# Patient Record
Sex: FEMALE | Race: OTHER | HISPANIC OR LATINO
[De-identification: names, ages, dates, MRNs, and addresses within clinical notes are randomized per-mention and may not be internally consistent; named-entity substitution may affect disease eponyms.]

---

## 2017-08-24 ENCOUNTER — APPOINTMENT (OUTPATIENT)
Dept: CARDIOLOGY | Facility: CLINIC | Age: 82
End: 2017-08-24
Payer: MEDICARE

## 2017-08-24 ENCOUNTER — NON-APPOINTMENT (OUTPATIENT)
Age: 82
End: 2017-08-24

## 2017-08-24 VITALS
SYSTOLIC BLOOD PRESSURE: 137 MMHG | BODY MASS INDEX: 20.44 KG/M2 | HEART RATE: 58 BPM | OXYGEN SATURATION: 98 % | HEIGHT: 64 IN | WEIGHT: 119.71 LBS | DIASTOLIC BLOOD PRESSURE: 74 MMHG

## 2017-08-24 DIAGNOSIS — Z86.79 PERSONAL HISTORY OF OTHER DISEASES OF THE CIRCULATORY SYSTEM: ICD-10-CM

## 2017-08-24 DIAGNOSIS — Z09 ENCOUNTER FOR FOLLOW-UP EXAMINATION AFTER COMPLETED TREATMENT FOR CONDITIONS OTHER THAN MALIGNANT NEOPLASM: ICD-10-CM

## 2017-08-24 DIAGNOSIS — Z82.49 FAMILY HISTORY OF ISCHEMIC HEART DISEASE AND OTHER DISEASES OF THE CIRCULATORY SYSTEM: ICD-10-CM

## 2017-08-24 PROCEDURE — 99204 OFFICE O/P NEW MOD 45 MIN: CPT

## 2017-08-24 PROCEDURE — 93000 ELECTROCARDIOGRAM COMPLETE: CPT

## 2017-08-24 RX ORDER — ASPIRIN ENTERIC COATED TABLETS 81 MG 81 MG/1
81 TABLET, DELAYED RELEASE ORAL DAILY
Qty: 90 | Refills: 3 | Status: ACTIVE | COMMUNITY
Start: 2017-08-24 | End: 1900-01-01

## 2017-08-24 RX ORDER — DILTIAZEM HYDROCHLORIDE 120 MG/1
120 CAPSULE, EXTENDED RELEASE ORAL DAILY
Qty: 90 | Refills: 3 | Status: ACTIVE | COMMUNITY
Start: 1900-01-01 | End: 1900-01-01

## 2017-09-29 ENCOUNTER — OUTPATIENT (OUTPATIENT)
Dept: OUTPATIENT SERVICES | Facility: HOSPITAL | Age: 82
LOS: 1 days | End: 2017-09-29
Payer: COMMERCIAL

## 2017-09-29 ENCOUNTER — APPOINTMENT (OUTPATIENT)
Dept: RADIOLOGY | Facility: CLINIC | Age: 82
End: 2017-09-29
Payer: MEDICARE

## 2017-09-29 DIAGNOSIS — Z00.8 ENCOUNTER FOR OTHER GENERAL EXAMINATION: ICD-10-CM

## 2017-09-29 PROCEDURE — 73030 X-RAY EXAM OF SHOULDER: CPT | Mod: 26,RT

## 2017-09-29 PROCEDURE — 73030 X-RAY EXAM OF SHOULDER: CPT

## 2017-10-05 ENCOUNTER — APPOINTMENT (OUTPATIENT)
Dept: MAMMOGRAPHY | Facility: CLINIC | Age: 82
End: 2017-10-05
Payer: MEDICARE

## 2017-10-05 ENCOUNTER — OUTPATIENT (OUTPATIENT)
Dept: OUTPATIENT SERVICES | Facility: HOSPITAL | Age: 82
LOS: 1 days | End: 2017-10-05
Payer: COMMERCIAL

## 2017-10-05 DIAGNOSIS — Z00.00 ENCOUNTER FOR GENERAL ADULT MEDICAL EXAMINATION WITHOUT ABNORMAL FINDINGS: ICD-10-CM

## 2017-10-05 PROCEDURE — G0204: CPT | Mod: 26

## 2017-10-05 PROCEDURE — G0279: CPT | Mod: 26

## 2017-10-05 PROCEDURE — 77066 DX MAMMO INCL CAD BI: CPT

## 2017-10-05 PROCEDURE — G0279: CPT

## 2017-10-07 ENCOUNTER — OUTPATIENT (OUTPATIENT)
Dept: OUTPATIENT SERVICES | Facility: HOSPITAL | Age: 82
LOS: 1 days | End: 2017-10-07
Payer: COMMERCIAL

## 2017-10-07 ENCOUNTER — APPOINTMENT (OUTPATIENT)
Dept: MRI IMAGING | Facility: CLINIC | Age: 82
End: 2017-10-07
Payer: MEDICARE

## 2017-10-07 DIAGNOSIS — I65.21 OCCLUSION AND STENOSIS OF RIGHT CAROTID ARTERY: ICD-10-CM

## 2017-10-07 PROCEDURE — 82565 ASSAY OF CREATININE: CPT

## 2017-10-07 PROCEDURE — 70549 MR ANGIOGRAPH NECK W/O&W/DYE: CPT | Mod: 26

## 2017-10-07 PROCEDURE — 70549 MR ANGIOGRAPH NECK W/O&W/DYE: CPT

## 2017-10-07 PROCEDURE — A9579: CPT

## 2017-10-12 RX ORDER — ATORVASTATIN CALCIUM 20 MG/1
20 TABLET, FILM COATED ORAL
Qty: 1 | Refills: 1 | Status: ACTIVE | COMMUNITY
Start: 1900-01-01 | End: 1900-01-01

## 2018-05-07 ENCOUNTER — OTHER (OUTPATIENT)
Age: 83
End: 2018-05-07

## 2018-06-04 ENCOUNTER — OTHER (OUTPATIENT)
Age: 83
End: 2018-06-04

## 2018-07-03 ENCOUNTER — APPOINTMENT (OUTPATIENT)
Dept: CARDIOLOGY | Facility: CLINIC | Age: 83
End: 2018-07-03
Payer: MEDICARE

## 2018-07-03 PROCEDURE — 93880 EXTRACRANIAL BILAT STUDY: CPT

## 2018-07-12 ENCOUNTER — NON-APPOINTMENT (OUTPATIENT)
Age: 83
End: 2018-07-12

## 2018-07-12 ENCOUNTER — APPOINTMENT (OUTPATIENT)
Dept: CARDIOLOGY | Facility: CLINIC | Age: 83
End: 2018-07-12
Payer: MEDICARE

## 2018-07-12 VITALS
HEART RATE: 60 BPM | OXYGEN SATURATION: 100 % | BODY MASS INDEX: 21.51 KG/M2 | TEMPERATURE: 98.1 F | WEIGHT: 126 LBS | HEIGHT: 64 IN | SYSTOLIC BLOOD PRESSURE: 157 MMHG | DIASTOLIC BLOOD PRESSURE: 70 MMHG

## 2018-07-12 DIAGNOSIS — R10.9 UNSPECIFIED ABDOMINAL PAIN: ICD-10-CM

## 2018-07-12 PROCEDURE — 99214 OFFICE O/P EST MOD 30 MIN: CPT

## 2018-07-12 PROCEDURE — 93000 ELECTROCARDIOGRAM COMPLETE: CPT

## 2018-07-15 ENCOUNTER — TRANSCRIPTION ENCOUNTER (OUTPATIENT)
Age: 83
End: 2018-07-15

## 2018-07-16 ENCOUNTER — RESULT REVIEW (OUTPATIENT)
Age: 83
End: 2018-07-16

## 2018-07-16 ENCOUNTER — INPATIENT (INPATIENT)
Facility: HOSPITAL | Age: 83
LOS: 4 days | Discharge: HOME | DRG: 416 | End: 2018-07-21
Attending: SURGERY | Admitting: SURGERY
Payer: MEDICARE

## 2018-07-16 VITALS
SYSTOLIC BLOOD PRESSURE: 124 MMHG | HEIGHT: 60 IN | HEART RATE: 92 BPM | RESPIRATION RATE: 17 BRPM | DIASTOLIC BLOOD PRESSURE: 74 MMHG | OXYGEN SATURATION: 96 % | TEMPERATURE: 98 F | WEIGHT: 154.98 LBS

## 2018-07-16 DIAGNOSIS — Z01.810 ENCOUNTER FOR PREPROCEDURAL CARDIOVASCULAR EXAMINATION: ICD-10-CM

## 2018-07-16 DIAGNOSIS — I25.10 ATHEROSCLEROTIC HEART DISEASE OF NATIVE CORONARY ARTERY WITHOUT ANGINA PECTORIS: ICD-10-CM

## 2018-07-16 DIAGNOSIS — Z29.9 ENCOUNTER FOR PROPHYLACTIC MEASURES, UNSPECIFIED: ICD-10-CM

## 2018-07-16 DIAGNOSIS — K82.2 PERFORATION OF GALLBLADDER: ICD-10-CM

## 2018-07-16 DIAGNOSIS — I65.21 OCCLUSION AND STENOSIS OF RIGHT CAROTID ARTERY: ICD-10-CM

## 2018-07-16 DIAGNOSIS — E78.4 OTHER HYPERLIPIDEMIA: ICD-10-CM

## 2018-07-16 LAB
ALBUMIN SERPL ELPH-MCNC: 2.7 G/DL — LOW (ref 3.3–5.2)
ALP SERPL-CCNC: 87 U/L — SIGNIFICANT CHANGE UP (ref 40–120)
ALT FLD-CCNC: 14 U/L — SIGNIFICANT CHANGE UP
ANION GAP SERPL CALC-SCNC: 15 MMOL/L — SIGNIFICANT CHANGE UP (ref 5–17)
APPEARANCE UR: CLEAR — SIGNIFICANT CHANGE UP
APTT BLD: 27.9 SEC — SIGNIFICANT CHANGE UP (ref 27.5–37.4)
APTT BLD: 30.6 SEC — SIGNIFICANT CHANGE UP (ref 27.5–37.4)
AST SERPL-CCNC: 17 U/L — SIGNIFICANT CHANGE UP
BACTERIA # UR AUTO: ABNORMAL
BASOPHILS # BLD AUTO: 0 K/UL — SIGNIFICANT CHANGE UP (ref 0–0.2)
BASOPHILS # BLD AUTO: 0 K/UL — SIGNIFICANT CHANGE UP (ref 0–0.2)
BASOPHILS NFR BLD AUTO: 0.1 % — SIGNIFICANT CHANGE UP (ref 0–2)
BASOPHILS NFR BLD AUTO: 0.1 % — SIGNIFICANT CHANGE UP (ref 0–2)
BILIRUB SERPL-MCNC: 0.5 MG/DL — SIGNIFICANT CHANGE UP (ref 0.4–2)
BILIRUB UR-MCNC: NEGATIVE — SIGNIFICANT CHANGE UP
BLD GP AB SCN SERPL QL: SIGNIFICANT CHANGE UP
BUN SERPL-MCNC: 24 MG/DL — HIGH (ref 8–20)
CALCIUM SERPL-MCNC: 7.7 MG/DL — LOW (ref 8.6–10.2)
CHLORIDE SERPL-SCNC: 96 MMOL/L — LOW (ref 98–107)
CO2 SERPL-SCNC: 20 MMOL/L — LOW (ref 22–29)
COLOR SPEC: YELLOW — SIGNIFICANT CHANGE UP
COMMENT - URINE: SIGNIFICANT CHANGE UP
CREAT SERPL-MCNC: 0.95 MG/DL — SIGNIFICANT CHANGE UP (ref 0.5–1.3)
DIFF PNL FLD: ABNORMAL
EOSINOPHIL # BLD AUTO: 0 K/UL — SIGNIFICANT CHANGE UP (ref 0–0.5)
EOSINOPHIL # BLD AUTO: 0.3 K/UL — SIGNIFICANT CHANGE UP (ref 0–0.5)
EOSINOPHIL NFR BLD AUTO: 0.3 % — SIGNIFICANT CHANGE UP (ref 0–6)
EOSINOPHIL NFR BLD AUTO: 2.2 % — SIGNIFICANT CHANGE UP (ref 0–6)
EPI CELLS # UR: ABNORMAL
GLUCOSE SERPL-MCNC: 134 MG/DL — HIGH (ref 70–115)
GLUCOSE UR QL: NEGATIVE MG/DL — SIGNIFICANT CHANGE UP
GRAN CASTS # UR COMP ASSIST: ABNORMAL /LPF
HCT VFR BLD CALC: 30.8 % — LOW (ref 37–47)
HCT VFR BLD CALC: 35.1 % — LOW (ref 37–47)
HGB BLD-MCNC: 10.2 G/DL — LOW (ref 12–16)
HGB BLD-MCNC: 11.9 G/DL — LOW (ref 12–16)
INR BLD: 1.04 RATIO — SIGNIFICANT CHANGE UP (ref 0.88–1.16)
KETONES UR-MCNC: ABNORMAL
LACTATE BLDV-MCNC: 0.7 MMOL/L — SIGNIFICANT CHANGE UP (ref 0.5–2)
LACTATE SERPL-SCNC: 1.4 MMOL/L — SIGNIFICANT CHANGE UP (ref 0.5–2)
LEUKOCYTE ESTERASE UR-ACNC: ABNORMAL
LYMPHOCYTES # BLD AUTO: 1.1 K/UL — SIGNIFICANT CHANGE UP (ref 1–4.8)
LYMPHOCYTES # BLD AUTO: 1.4 K/UL — SIGNIFICANT CHANGE UP (ref 1–4.8)
LYMPHOCYTES # BLD AUTO: 6.8 % — LOW (ref 20–55)
LYMPHOCYTES # BLD AUTO: 9.9 % — LOW (ref 20–55)
MAGNESIUM SERPL-MCNC: 2 MG/DL — SIGNIFICANT CHANGE UP (ref 1.6–2.6)
MCHC RBC-ENTMCNC: 28.3 PG — SIGNIFICANT CHANGE UP (ref 27–31)
MCHC RBC-ENTMCNC: 28.8 PG — SIGNIFICANT CHANGE UP (ref 27–31)
MCHC RBC-ENTMCNC: 33.1 G/DL — SIGNIFICANT CHANGE UP (ref 32–36)
MCHC RBC-ENTMCNC: 33.9 G/DL — SIGNIFICANT CHANGE UP (ref 32–36)
MCV RBC AUTO: 85 FL — SIGNIFICANT CHANGE UP (ref 81–99)
MCV RBC AUTO: 85.6 FL — SIGNIFICANT CHANGE UP (ref 81–99)
MONOCYTES # BLD AUTO: 1.3 K/UL — HIGH (ref 0–0.8)
MONOCYTES # BLD AUTO: 1.4 K/UL — HIGH (ref 0–0.8)
MONOCYTES NFR BLD AUTO: 8.1 % — SIGNIFICANT CHANGE UP (ref 3–10)
MONOCYTES NFR BLD AUTO: 9.9 % — SIGNIFICANT CHANGE UP (ref 3–10)
NEUTROPHILS # BLD AUTO: 10.6 K/UL — HIGH (ref 1.8–8)
NEUTROPHILS # BLD AUTO: 13.1 K/UL — HIGH (ref 1.8–8)
NEUTROPHILS NFR BLD AUTO: 77.3 % — HIGH (ref 37–73)
NEUTROPHILS NFR BLD AUTO: 84.1 % — HIGH (ref 37–73)
NITRITE UR-MCNC: NEGATIVE — SIGNIFICANT CHANGE UP
PH UR: 6 — SIGNIFICANT CHANGE UP (ref 5–8)
PLATELET # BLD AUTO: 221 K/UL — SIGNIFICANT CHANGE UP (ref 150–400)
PLATELET # BLD AUTO: 327 K/UL — SIGNIFICANT CHANGE UP (ref 150–400)
POTASSIUM SERPL-MCNC: 3.9 MMOL/L — SIGNIFICANT CHANGE UP (ref 3.5–5.3)
POTASSIUM SERPL-SCNC: 3.9 MMOL/L — SIGNIFICANT CHANGE UP (ref 3.5–5.3)
PROT SERPL-MCNC: 6.1 G/DL — LOW (ref 6.6–8.7)
PROT UR-MCNC: 100 MG/DL
PROTHROM AB SERPL-ACNC: 11.5 SEC — SIGNIFICANT CHANGE UP (ref 9.8–12.7)
RBC # BLD: 3.6 M/UL — LOW (ref 4.4–5.2)
RBC # BLD: 4.13 M/UL — LOW (ref 4.4–5.2)
RBC # FLD: 13.8 % — SIGNIFICANT CHANGE UP (ref 11–15.6)
RBC # FLD: 14.1 % — SIGNIFICANT CHANGE UP (ref 11–15.6)
RBC CASTS # UR COMP ASSIST: SIGNIFICANT CHANGE UP /HPF (ref 0–4)
SODIUM SERPL-SCNC: 131 MMOL/L — LOW (ref 135–145)
SP GR SPEC: 1.02 — SIGNIFICANT CHANGE UP (ref 1.01–1.02)
TYPE + AB SCN PNL BLD: SIGNIFICANT CHANGE UP
UROBILINOGEN FLD QL: 1 MG/DL
WBC # BLD: 13.7 K/UL — HIGH (ref 4.8–10.8)
WBC # BLD: 15.6 K/UL — HIGH (ref 4.8–10.8)
WBC # FLD AUTO: 13.7 K/UL — HIGH (ref 4.8–10.8)
WBC # FLD AUTO: 15.6 K/UL — HIGH (ref 4.8–10.8)
WBC UR QL: SIGNIFICANT CHANGE UP /HPF (ref 0–5)

## 2018-07-16 PROCEDURE — 88304 TISSUE EXAM BY PATHOLOGIST: CPT | Mod: 26

## 2018-07-16 PROCEDURE — 99291 CRITICAL CARE FIRST HOUR: CPT

## 2018-07-16 PROCEDURE — 47600 CHOLECYSTECTOMY: CPT

## 2018-07-16 PROCEDURE — 99223 1ST HOSP IP/OBS HIGH 75: CPT

## 2018-07-16 PROCEDURE — 93010 ELECTROCARDIOGRAM REPORT: CPT

## 2018-07-16 PROCEDURE — 74177 CT ABD & PELVIS W/CONTRAST: CPT | Mod: 26

## 2018-07-16 PROCEDURE — 71045 X-RAY EXAM CHEST 1 VIEW: CPT | Mod: 26

## 2018-07-16 RX ORDER — HYDROMORPHONE HYDROCHLORIDE 2 MG/ML
0.5 INJECTION INTRAMUSCULAR; INTRAVENOUS; SUBCUTANEOUS
Qty: 0 | Refills: 0 | Status: DISCONTINUED | OUTPATIENT
Start: 2018-07-16 | End: 2018-07-16

## 2018-07-16 RX ORDER — SODIUM CHLORIDE 9 MG/ML
1000 INJECTION INTRAMUSCULAR; INTRAVENOUS; SUBCUTANEOUS ONCE
Qty: 0 | Refills: 0 | Status: COMPLETED | OUTPATIENT
Start: 2018-07-16 | End: 2018-07-16

## 2018-07-16 RX ORDER — SODIUM CHLORIDE 9 MG/ML
3 INJECTION INTRAMUSCULAR; INTRAVENOUS; SUBCUTANEOUS ONCE
Qty: 0 | Refills: 0 | Status: COMPLETED | OUTPATIENT
Start: 2018-07-16 | End: 2018-07-16

## 2018-07-16 RX ORDER — ACETAMINOPHEN 500 MG
1000 TABLET ORAL ONCE
Qty: 0 | Refills: 0 | Status: COMPLETED | OUTPATIENT
Start: 2018-07-16 | End: 2018-07-16

## 2018-07-16 RX ORDER — PIPERACILLIN AND TAZOBACTAM 4; .5 G/20ML; G/20ML
3.38 INJECTION, POWDER, LYOPHILIZED, FOR SOLUTION INTRAVENOUS ONCE
Qty: 0 | Refills: 0 | Status: COMPLETED | OUTPATIENT
Start: 2018-07-16 | End: 2018-07-16

## 2018-07-16 RX ORDER — DOCUSATE SODIUM 100 MG
100 CAPSULE ORAL THREE TIMES A DAY
Qty: 0 | Refills: 0 | Status: DISCONTINUED | OUTPATIENT
Start: 2018-07-16 | End: 2018-07-21

## 2018-07-16 RX ORDER — HYDROMORPHONE HYDROCHLORIDE 2 MG/ML
0.5 INJECTION INTRAMUSCULAR; INTRAVENOUS; SUBCUTANEOUS ONCE
Qty: 0 | Refills: 0 | Status: DISCONTINUED | OUTPATIENT
Start: 2018-07-16 | End: 2018-07-16

## 2018-07-16 RX ORDER — HYDROMORPHONE HYDROCHLORIDE 2 MG/ML
0.5 INJECTION INTRAMUSCULAR; INTRAVENOUS; SUBCUTANEOUS
Qty: 0 | Refills: 0 | Status: DISCONTINUED | OUTPATIENT
Start: 2018-07-16 | End: 2018-07-17

## 2018-07-16 RX ORDER — MORPHINE SULFATE 50 MG/1
4 CAPSULE, EXTENDED RELEASE ORAL ONCE
Qty: 0 | Refills: 0 | Status: DISCONTINUED | OUTPATIENT
Start: 2018-07-16 | End: 2018-07-16

## 2018-07-16 RX ORDER — ONDANSETRON 8 MG/1
4 TABLET, FILM COATED ORAL ONCE
Qty: 0 | Refills: 0 | Status: DISCONTINUED | OUTPATIENT
Start: 2018-07-16 | End: 2018-07-21

## 2018-07-16 RX ORDER — ONDANSETRON 8 MG/1
4 TABLET, FILM COATED ORAL ONCE
Qty: 0 | Refills: 0 | Status: DISCONTINUED | OUTPATIENT
Start: 2018-07-16 | End: 2018-07-17

## 2018-07-16 RX ORDER — ONDANSETRON 8 MG/1
4 TABLET, FILM COATED ORAL ONCE
Qty: 0 | Refills: 0 | Status: COMPLETED | OUTPATIENT
Start: 2018-07-16 | End: 2018-07-16

## 2018-07-16 RX ORDER — SODIUM CHLORIDE 9 MG/ML
1000 INJECTION, SOLUTION INTRAVENOUS
Qty: 0 | Refills: 0 | Status: DISCONTINUED | OUTPATIENT
Start: 2018-07-16 | End: 2018-07-17

## 2018-07-16 RX ORDER — SENNA PLUS 8.6 MG/1
2 TABLET ORAL AT BEDTIME
Qty: 0 | Refills: 0 | Status: DISCONTINUED | OUTPATIENT
Start: 2018-07-16 | End: 2018-07-21

## 2018-07-16 RX ORDER — PIPERACILLIN AND TAZOBACTAM 4; .5 G/20ML; G/20ML
3.38 INJECTION, POWDER, LYOPHILIZED, FOR SOLUTION INTRAVENOUS EVERY 8 HOURS
Qty: 0 | Refills: 0 | Status: DISCONTINUED | OUTPATIENT
Start: 2018-07-16 | End: 2018-07-21

## 2018-07-16 RX ORDER — ACETAMINOPHEN 500 MG
1000 TABLET ORAL ONCE
Qty: 0 | Refills: 0 | Status: DISCONTINUED | OUTPATIENT
Start: 2018-07-16 | End: 2018-07-16

## 2018-07-16 RX ORDER — HYDROMORPHONE HYDROCHLORIDE 2 MG/ML
0.5 INJECTION INTRAMUSCULAR; INTRAVENOUS; SUBCUTANEOUS EVERY 4 HOURS
Qty: 0 | Refills: 0 | Status: DISCONTINUED | OUTPATIENT
Start: 2018-07-16 | End: 2018-07-18

## 2018-07-16 RX ORDER — FENTANYL CITRATE 50 UG/ML
25 INJECTION INTRAVENOUS
Qty: 0 | Refills: 0 | Status: DISCONTINUED | OUTPATIENT
Start: 2018-07-16 | End: 2018-07-16

## 2018-07-16 RX ADMIN — FENTANYL CITRATE 25 MICROGRAM(S): 50 INJECTION INTRAVENOUS at 22:16

## 2018-07-16 RX ADMIN — FENTANYL CITRATE 25 MICROGRAM(S): 50 INJECTION INTRAVENOUS at 23:00

## 2018-07-16 RX ADMIN — MORPHINE SULFATE 4 MILLIGRAM(S): 50 CAPSULE, EXTENDED RELEASE ORAL at 14:40

## 2018-07-16 RX ADMIN — Medication 1000 MILLIGRAM(S): at 17:15

## 2018-07-16 RX ADMIN — Medication 400 MILLIGRAM(S): at 17:00

## 2018-07-16 RX ADMIN — FENTANYL CITRATE 25 MICROGRAM(S): 50 INJECTION INTRAVENOUS at 23:20

## 2018-07-16 RX ADMIN — SODIUM CHLORIDE 3 MILLILITER(S): 9 INJECTION INTRAMUSCULAR; INTRAVENOUS; SUBCUTANEOUS at 14:36

## 2018-07-16 RX ADMIN — FENTANYL CITRATE 25 MICROGRAM(S): 50 INJECTION INTRAVENOUS at 23:25

## 2018-07-16 RX ADMIN — FENTANYL CITRATE 25 MICROGRAM(S): 50 INJECTION INTRAVENOUS at 23:08

## 2018-07-16 RX ADMIN — PIPERACILLIN AND TAZOBACTAM 200 GRAM(S): 4; .5 INJECTION, POWDER, LYOPHILIZED, FOR SOLUTION INTRAVENOUS at 17:52

## 2018-07-16 RX ADMIN — FENTANYL CITRATE 25 MICROGRAM(S): 50 INJECTION INTRAVENOUS at 23:07

## 2018-07-16 RX ADMIN — FENTANYL CITRATE 25 MICROGRAM(S): 50 INJECTION INTRAVENOUS at 22:55

## 2018-07-16 RX ADMIN — SODIUM CHLORIDE 1000 MILLILITER(S): 9 INJECTION INTRAMUSCULAR; INTRAVENOUS; SUBCUTANEOUS at 14:40

## 2018-07-16 RX ADMIN — SODIUM CHLORIDE 100 MILLILITER(S): 9 INJECTION, SOLUTION INTRAVENOUS at 22:15

## 2018-07-16 RX ADMIN — MORPHINE SULFATE 4 MILLIGRAM(S): 50 CAPSULE, EXTENDED RELEASE ORAL at 15:00

## 2018-07-16 RX ADMIN — ONDANSETRON 4 MILLIGRAM(S): 8 TABLET, FILM COATED ORAL at 14:40

## 2018-07-16 NOTE — H&P ADULT - NSHPREVIEWOFSYSTEMS_GEN_ALL_CORE
denies headaches, blurry vision, nasal discharge, sore throat  denies chest pain, shortness of breath, palpitations  denies diarrhea, blood per rectum, dysuria  denies difficulty ambulating, weakness in extremities

## 2018-07-16 NOTE — H&P ADULT - PROBLEM SELECTOR PLAN 1
-admit to ACS service  -OR for laparoscopic cholecystectomy, possible open with intraoperative cholangiogram  -NPO/IVF  -IV antibiotics  -dvt ppx

## 2018-07-16 NOTE — ED PROVIDER NOTE - PMH
Breast cancer    CAD (coronary artery disease)    Carotid stenosis    Hyperlipidemia    Hypertension    Osteoarthritis    Osteopenia

## 2018-07-16 NOTE — CONSULT NOTE ADULT - PROBLEM SELECTOR RECOMMENDATION 9
Pre-operative cardiovascular risk evaluation and management. No cardiac symptoms. Non-ischemic ECG. METs > 4.   RCRI (revised cardiac risk index score) < 1%. Miles perioperative cardiac risk score <1%. NSQIP score < 1%. stable CAD.  No further cardiac work up is needed.  Benefits of surgery outweigh the risk. Further cardiac work up will not change risk of the patient. Proceed for surgery as indicated.

## 2018-07-16 NOTE — BRIEF OPERATIVE NOTE - OPERATION/FINDINGS
Perforated cholecystitis with purulent bile. RUQ phlegmon. Bleeding from liver bed controlled with Gelfoam, surgicel and Argon beam. Bola drain left in gallbladder fossa

## 2018-07-16 NOTE — H&P ADULT - ATTENDING COMMENTS
82YOF with CAD, s/p CABG and multiple cardiac stents c/o 4 days of right upper quadrant pain. Currently afebrile, hemodynamically stable, right upper quadrant and right lower quadrant tenderness. Leukocytosis. CT scan concerning for perforated cholecystitis. Also CBD stone noted on CT scan with normal LFTs.  Discussed with patient need for urgent cholecystectomy, possible cholangiogram, and possible ventral hernia repair. Plan for open approach as I expect significant inflammation with collections apparent around gallbladder and patient with incisional epigastric hernia that is nonobstructing and well healed incision scar that is from just above umbilicus to pubis from prior C sections. Patient high risk secondary to cardiac history and urgent nature of surgery needed.

## 2018-07-16 NOTE — CONSULT NOTE ADULT - ASSESSMENT
This is a 82 year old woman with coronary artery disease adn CABG , prior PCI, right carotid stenosis with cholecystitis for pre-operative cardiovascular risk evaluation and management

## 2018-07-16 NOTE — CONSULT NOTE ADULT - SUBJECTIVE AND OBJECTIVE BOX
[place] [facility]                                                        Office: [address] [address]                                                       Telephone: [phone]. Fax:[phone]                                                              CARDIOLOGY CONSULTATION NOTE                                                                                             Consult requested by:  acute care surgery     Reason for Consultation: pre-operative cardiovascular risk evaluation and management     History obtained by: Patient and medical record     obtained: No    Chief complaint:    Patient is a 82y old  Female who presents with a chief complaint of acute cholecystitis (2018 18:48)      HPI:  82 year old female presents to ED with 4 day history of worsening abdominal pain, more so in the right upper quadrant. The pain has become more constant in nature and is associated with emesis. Never experienced abdominal pain like this before. Recent travel to [place], but no other recent travel. No sick contacts or family members with similar symptoms. Passing flatus. No bowel movement in a couple days. Denies fevers, chills, chest pain, shortness of breath. (2018 18:48)    Above history reviewed and agreed.  82 year old woman with history of coronary artery bypass graft  [place] [facility])  coronary artery disease with multiple PCI,  right carotid artery stenosis, here with abdominal pain for 4 days.  patient states she has abdominal pain worse on deep inspiration, right side worse, witha nausea nad vomiting. On Ct scan found to have cholecysttitis.   Patients taste she was in her usual state of health 4 days ago. Ambulates. No chest pain. nodyspnea. METS >  4         REVIEW OF SYMPTOMS: Cardiovascular:  See HPI. No chest pain,  No dyspnea,  No syncope,  No palpitations, No dizziness, No Orthopnea,      No Paroxsymal nocturnal dyspnea;  Respiratory:  No Dyspnea, No cough,     Genitourinary:  No dysuria, no hematuria; Gastrointestinal:  +  nausea, No vomiting. No diarrhea.  +  abdominal pain. No dark color stool, no melena ; Neurological: No headache, no dizziness, no slurred speech;  Psychiatric: No agitation, no anxiety.  ALL OTHER REVIEW OF SYSTEMS ARE NEGATIVE.    ALLERGIES: Allergies    No Known Allergies    Intolerances          CURRENT MEDICATIONS:     acetaminophen  IVPB.      HOME MEDICATIONS:    PAST MEDICAL HISTORY  Osteoarthritis  Osteopenia  Breast cancer  Hyperlipidemia  Hypertension  Carotid stenosis  CAD (coronary artery disease)      PAST SURGICAL HISTORY  H/O benign breast biopsy  S/P lumpectomy, right breast  S/P CABG x 3      FAMILY HISTORY:  Family history of heart disease (Sibling): 6 siblings had CABG, 8 siblings had heart disease      SOCIAL HISTORY:  Denies smoking/alcohol/drugs      Vital Signs Last 24 Hrs  T(C): 37.3 (2018 16:54), Max: 37.3 (2018 16:54)  T(F): 99.1 (2018 16:54), Max: 99.1 (2018 16:54)  HR: 77 (2018 16:54) (77 - 92)  BP: 109/65 (2018 16:54) (109/65 - 124/74)  BP(mean): --  RR: 18 (2018 16:54) (17 - 18)  SpO2: 95% (2018 16:54) (95% - 96%)      PHYSICAL EXAM:  Constitutional: Comfortable . No acute distress.   HEENT: Atraumatic and normcephalic , neck is supple . no JVD. No carotid bruit. PEERL   CNS: A&Ox3. No focal deficits. EOMI. Cranial nerves II-IX are intact.   Lymph Nodes: Cervical : Not palpable.  Respiratory: CTAB  Cardiovascular: S1S2 RRR. No murmur/rubs or gallop.  Gastrointestinal:  right Upper quadrant tenderness  and non distended . +Bowel sounds. +  Pena's sign.  Extremities: No edema.   Psychiatric: Calm . no agitation.  Skin: No skin rash/ulcers visualized to face, hands or feet.    Intake and output:     LABS:                        11.9   15.6  )-----------( 327      ( 2018 14:45 )             35.1     07-16    131<L>  |  96<L>  |  24.0<H>  ----------------------------<  134<H>  3.9   |  20.0<L>  |  0.95    Ca    7.7<L>      2018 15:32    TPro  6.1<L>  /  Alb  2.7<L>  /  TBili  0.5  /  DBili  x   /  AST  17  /  ALT  14  /  AlkPhos  87  07-16      ;p-BNP=  PT/INR - ( 2018 15:33 )   PT: 11.5 sec;   INR: 1.04 ratio         PTT - ( 2018 15:33 )  PTT:27.9 sec  Urinalysis Basic - ( 2018 14:46 )    Color: Yellow / Appearance: Clear / S.020 / pH: x  Gluc: x / Ketone: Trace  / Bili: Negative / Urobili: 1 mg/dL   Blood: x / Protein: 100 mg/dL / Nitrite: Negative   Leuk Esterase: Trace / RBC: 0-2 /HPF / WBC 10-15 /HPF   Sq Epi: x / Non Sq Epi: Moderate / Bacteria: Few        INTERPRETATION OF TELEMETRY: Reviewed by me. not available   ECG: Reviewed by me. Sinus rhythm. LBBB. Non-specific ST- T changes     RADIOLOGY & ADDITIONAL STUDIES:    X-ray:  reviewed by me. not available   CT scan: < from: CT Abdomen and Pelvis w/ IV Cont (18 @ 16:43) >  IMPRESSION: Acute cholecystitis with suspicion for gallbladder   perforation with fluid collections in the right upper quadrant.    Choledocholithiasis and biliary ductaldilatation.    < end of copied text >       ECHO FINDINGS: Date: not available

## 2018-07-16 NOTE — ED ADULT NURSE NOTE - OBJECTIVE STATEMENT
pt AOX4 c/o N/V and abdominal pain, pain is also radiating to her right side, pt states pain was sudden and was Wednesday night. Pt denies any fevers, cough chest pain, SB. pt also c/o dizziness, and decrease PO intake.

## 2018-07-16 NOTE — H&P ADULT - ASSESSMENT
82 year female presenting with worsening abdominal pain associated with nausea and vomiting. Imaging demonstrating acute cholecystitis with possible perforation. Not cholangitic at this time. Noted stone in distal common bile duct on imaging

## 2018-07-16 NOTE — H&P ADULT - HISTORY OF PRESENT ILLNESS
82 year old female presents to ED with 4 day history of worsening abdominal pain, more so in the right upper quadrant. The pain has become more constant in nature and is associated with emesis. Never experienced abdominal pain like this before. Recent travel to [place], but no other recent travel. No sick contacts or family members with similar symptoms. Passing flatus. No bowel movement in a couple days. Denies fevers, chills, chest pain, shortness of breath.

## 2018-07-16 NOTE — ED PROVIDER NOTE - GASTROINTESTINAL, MLM
abdomen distended, bloated diffuse tenderness throughout, worse in RUQ and RLQ. rebound tenderness. BS present

## 2018-07-16 NOTE — BRIEF OPERATIVE NOTE - PROCEDURE
<<-----Click on this checkbox to enter Procedure Open cholecystectomy  07/16/2018    Active  AMALYSHA

## 2018-07-17 LAB
ALBUMIN SERPL ELPH-MCNC: 2.1 G/DL — LOW (ref 3.3–5.2)
ALP SERPL-CCNC: 85 U/L — SIGNIFICANT CHANGE UP (ref 40–120)
ALT FLD-CCNC: 58 U/L — HIGH
ANION GAP SERPL CALC-SCNC: 16 MMOL/L — SIGNIFICANT CHANGE UP (ref 5–17)
AST SERPL-CCNC: 96 U/L — HIGH
BASOPHILS # BLD AUTO: 0 K/UL — SIGNIFICANT CHANGE UP (ref 0–0.2)
BASOPHILS NFR BLD AUTO: 0.1 % — SIGNIFICANT CHANGE UP (ref 0–2)
BILIRUB DIRECT SERPL-MCNC: 0.2 MG/DL — SIGNIFICANT CHANGE UP (ref 0–0.3)
BILIRUB INDIRECT FLD-MCNC: 0.4 MG/DL — SIGNIFICANT CHANGE UP (ref 0.2–1)
BILIRUB SERPL-MCNC: 0.6 MG/DL — SIGNIFICANT CHANGE UP (ref 0.4–2)
BUN SERPL-MCNC: 23 MG/DL — HIGH (ref 8–20)
CALCIUM SERPL-MCNC: 7.7 MG/DL — LOW (ref 8.6–10.2)
CHLORIDE SERPL-SCNC: 98 MMOL/L — SIGNIFICANT CHANGE UP (ref 98–107)
CO2 SERPL-SCNC: 17 MMOL/L — LOW (ref 22–29)
CREAT SERPL-MCNC: 0.92 MG/DL — SIGNIFICANT CHANGE UP (ref 0.5–1.3)
EOSINOPHIL # BLD AUTO: 0 K/UL — SIGNIFICANT CHANGE UP (ref 0–0.5)
EOSINOPHIL NFR BLD AUTO: 0.1 % — SIGNIFICANT CHANGE UP (ref 0–6)
GLUCOSE SERPL-MCNC: 192 MG/DL — HIGH (ref 70–115)
HCT VFR BLD CALC: 29.7 % — LOW (ref 37–47)
HGB BLD-MCNC: 10 G/DL — LOW (ref 12–16)
LYMPHOCYTES # BLD AUTO: 0.7 K/UL — LOW (ref 1–4.8)
LYMPHOCYTES # BLD AUTO: 5.5 % — LOW (ref 20–55)
MAGNESIUM SERPL-MCNC: 2 MG/DL — SIGNIFICANT CHANGE UP (ref 1.6–2.6)
MCHC RBC-ENTMCNC: 28.5 PG — SIGNIFICANT CHANGE UP (ref 27–31)
MCHC RBC-ENTMCNC: 33.7 G/DL — SIGNIFICANT CHANGE UP (ref 32–36)
MCV RBC AUTO: 84.6 FL — SIGNIFICANT CHANGE UP (ref 81–99)
MONOCYTES # BLD AUTO: 0.7 K/UL — SIGNIFICANT CHANGE UP (ref 0–0.8)
MONOCYTES NFR BLD AUTO: 5.7 % — SIGNIFICANT CHANGE UP (ref 3–10)
NEUTROPHILS # BLD AUTO: 11.2 K/UL — HIGH (ref 1.8–8)
NEUTROPHILS NFR BLD AUTO: 87.7 % — HIGH (ref 37–73)
PHOSPHATE SERPL-MCNC: 3.9 MG/DL — SIGNIFICANT CHANGE UP (ref 2.4–4.7)
PLATELET # BLD AUTO: 221 K/UL — SIGNIFICANT CHANGE UP (ref 150–400)
POTASSIUM SERPL-MCNC: 4.7 MMOL/L — SIGNIFICANT CHANGE UP (ref 3.5–5.3)
POTASSIUM SERPL-SCNC: 4.7 MMOL/L — SIGNIFICANT CHANGE UP (ref 3.5–5.3)
PROT SERPL-MCNC: 5.1 G/DL — LOW (ref 6.6–8.7)
RBC # BLD: 3.51 M/UL — LOW (ref 4.4–5.2)
RBC # FLD: 14.2 % — SIGNIFICANT CHANGE UP (ref 11–15.6)
SODIUM SERPL-SCNC: 131 MMOL/L — LOW (ref 135–145)
WBC # BLD: 12.8 K/UL — HIGH (ref 4.8–10.8)
WBC # FLD AUTO: 12.8 K/UL — HIGH (ref 4.8–10.8)

## 2018-07-17 RX ORDER — HEPARIN SODIUM 5000 [USP'U]/ML
5000 INJECTION INTRAVENOUS; SUBCUTANEOUS EVERY 8 HOURS
Qty: 0 | Refills: 0 | Status: DISCONTINUED | OUTPATIENT
Start: 2018-07-17 | End: 2018-07-21

## 2018-07-17 RX ORDER — ATORVASTATIN CALCIUM 80 MG/1
10 TABLET, FILM COATED ORAL AT BEDTIME
Qty: 0 | Refills: 0 | Status: DISCONTINUED | OUTPATIENT
Start: 2018-07-17 | End: 2018-07-21

## 2018-07-17 RX ORDER — SODIUM CHLORIDE 9 MG/ML
500 INJECTION, SOLUTION INTRAVENOUS
Qty: 0 | Refills: 0 | Status: DISCONTINUED | OUTPATIENT
Start: 2018-07-17 | End: 2018-07-17

## 2018-07-17 RX ORDER — SODIUM CHLORIDE 9 MG/ML
1000 INJECTION, SOLUTION INTRAVENOUS
Qty: 0 | Refills: 0 | Status: DISCONTINUED | OUTPATIENT
Start: 2018-07-17 | End: 2018-07-17

## 2018-07-17 RX ORDER — METOPROLOL TARTRATE 50 MG
25 TABLET ORAL DAILY
Qty: 0 | Refills: 0 | Status: DISCONTINUED | OUTPATIENT
Start: 2018-07-17 | End: 2018-07-21

## 2018-07-17 RX ADMIN — Medication 100 MILLIGRAM(S): at 21:22

## 2018-07-17 RX ADMIN — HYDROMORPHONE HYDROCHLORIDE 0.5 MILLIGRAM(S): 2 INJECTION INTRAMUSCULAR; INTRAVENOUS; SUBCUTANEOUS at 20:39

## 2018-07-17 RX ADMIN — HEPARIN SODIUM 5000 UNIT(S): 5000 INJECTION INTRAVENOUS; SUBCUTANEOUS at 21:22

## 2018-07-17 RX ADMIN — Medication 100 MILLIGRAM(S): at 13:58

## 2018-07-17 RX ADMIN — HYDROMORPHONE HYDROCHLORIDE 0.5 MILLIGRAM(S): 2 INJECTION INTRAMUSCULAR; INTRAVENOUS; SUBCUTANEOUS at 03:59

## 2018-07-17 RX ADMIN — FENTANYL CITRATE 25 MICROGRAM(S): 50 INJECTION INTRAVENOUS at 00:09

## 2018-07-17 RX ADMIN — SODIUM CHLORIDE 100 MILLILITER(S): 9 INJECTION, SOLUTION INTRAVENOUS at 02:19

## 2018-07-17 RX ADMIN — PIPERACILLIN AND TAZOBACTAM 25 GRAM(S): 4; .5 INJECTION, POWDER, LYOPHILIZED, FOR SOLUTION INTRAVENOUS at 01:50

## 2018-07-17 RX ADMIN — HYDROMORPHONE HYDROCHLORIDE 0.5 MILLIGRAM(S): 2 INJECTION INTRAMUSCULAR; INTRAVENOUS; SUBCUTANEOUS at 13:54

## 2018-07-17 RX ADMIN — HYDROMORPHONE HYDROCHLORIDE 0.5 MILLIGRAM(S): 2 INJECTION INTRAMUSCULAR; INTRAVENOUS; SUBCUTANEOUS at 06:25

## 2018-07-17 RX ADMIN — ATORVASTATIN CALCIUM 10 MILLIGRAM(S): 80 TABLET, FILM COATED ORAL at 21:22

## 2018-07-17 RX ADMIN — HYDROMORPHONE HYDROCHLORIDE 0.5 MILLIGRAM(S): 2 INJECTION INTRAMUSCULAR; INTRAVENOUS; SUBCUTANEOUS at 00:58

## 2018-07-17 RX ADMIN — HYDROMORPHONE HYDROCHLORIDE 0.5 MILLIGRAM(S): 2 INJECTION INTRAMUSCULAR; INTRAVENOUS; SUBCUTANEOUS at 02:05

## 2018-07-17 RX ADMIN — HYDROMORPHONE HYDROCHLORIDE 0.5 MILLIGRAM(S): 2 INJECTION INTRAMUSCULAR; INTRAVENOUS; SUBCUTANEOUS at 10:15

## 2018-07-17 RX ADMIN — HYDROMORPHONE HYDROCHLORIDE 0.5 MILLIGRAM(S): 2 INJECTION INTRAMUSCULAR; INTRAVENOUS; SUBCUTANEOUS at 14:10

## 2018-07-17 RX ADMIN — PIPERACILLIN AND TAZOBACTAM 25 GRAM(S): 4; .5 INJECTION, POWDER, LYOPHILIZED, FOR SOLUTION INTRAVENOUS at 18:25

## 2018-07-17 RX ADMIN — Medication 25 MILLIGRAM(S): at 05:47

## 2018-07-17 RX ADMIN — HYDROMORPHONE HYDROCHLORIDE 0.5 MILLIGRAM(S): 2 INJECTION INTRAMUSCULAR; INTRAVENOUS; SUBCUTANEOUS at 21:00

## 2018-07-17 RX ADMIN — HYDROMORPHONE HYDROCHLORIDE 0.5 MILLIGRAM(S): 2 INJECTION INTRAMUSCULAR; INTRAVENOUS; SUBCUTANEOUS at 09:54

## 2018-07-17 RX ADMIN — SODIUM CHLORIDE 500 MILLILITER(S): 9 INJECTION, SOLUTION INTRAVENOUS at 00:56

## 2018-07-17 RX ADMIN — PIPERACILLIN AND TAZOBACTAM 25 GRAM(S): 4; .5 INJECTION, POWDER, LYOPHILIZED, FOR SOLUTION INTRAVENOUS at 11:00

## 2018-07-17 RX ADMIN — Medication 100 MILLIGRAM(S): at 05:47

## 2018-07-17 RX ADMIN — HYDROMORPHONE HYDROCHLORIDE 0.5 MILLIGRAM(S): 2 INJECTION INTRAMUSCULAR; INTRAVENOUS; SUBCUTANEOUS at 05:54

## 2018-07-17 RX ADMIN — HEPARIN SODIUM 5000 UNIT(S): 5000 INJECTION INTRAVENOUS; SUBCUTANEOUS at 13:55

## 2018-07-17 NOTE — PROGRESS NOTE ADULT - SUBJECTIVE AND OBJECTIVE BOX
INTERVAL HPI/OVERNIGHT EVENTS: Patient was able to sleep post operatively. pain was moderately controlled with IV pain medication. Still complains of pain over incision site in right upper quadrant. Feels hungry. Denies fevers, chills, nausea, vomiting, chest pain, shortness of breath, palpitations. cooney catheter inserted intraoperatively draining clear, yellow urine. Approximately 20-30cc/hour. Bola drain left in the gallbladder fossa drained 50cc sanguinous fluid post operatively.     STATUS POST:  open cholecystectomy    POST OPERATIVE DAY #: 1    MEDICATIONS  (STANDING):  atorvastatin 10 milliGRAM(s) Oral at bedtime  diltiazem    Tablet 120 milliGRAM(s) Oral daily  docusate sodium 100 milliGRAM(s) Oral three times a day  lactated ringers. 500 milliLiter(s) (500 mL/Hr) IV Continuous <Continuous>  lactated ringers. 1000 milliLiter(s) (100 mL/Hr) IV Continuous <Continuous>  lactated ringers. 1000 milliLiter(s) (100 mL/Hr) IV Continuous <Continuous>  metoprolol succinate ER 25 milliGRAM(s) Oral daily  ondansetron Injectable 4 milliGRAM(s) IV Push once  piperacillin/tazobactam IVPB. 3.375 Gram(s) IV Intermittent every 8 hours    MEDICATIONS  (PRN):  HYDROmorphone  Injectable 0.5 milliGRAM(s) IV Push every 4 hours PRN Severe Pain (7 - 10)  ondansetron Injectable 4 milliGRAM(s) IV Push once PRN Nausea and/or Vomiting  senna 2 Tablet(s) Oral at bedtime PRN Constipation      Vital Signs Last 24 Hrs  T(C): 37.4 (2018 07:26), Max: 37.4 (2018 07:26)  T(F): 99.4 (2018 07:26), Max: 99.4 (2018 07:26)  HR: 69 (2018 09:30) (50 - 92)  BP: 137/64 (2018 09:30) (94/40 - 137/64)  BP(mean): 64 (2018 07:26) (54 - 69)  RR: 12 (2018 09:30) (10 - 21)  SpO2: 100% (2018 09:30) (95% - 100%)    Physical Exam:  constitutional: no acute distress, AOx3    Respiratory: Breath Sounds equal & clear to auscultation, no accessory muscle use    Cardiovascular: Regular rate & rhythm, normal S1, S2; no murmurs, gallops or rubs    Gastrointestinal: Soft, nondistended, tender to palpation over incision site right upper quadrant. no rebound tenderness, guarding, rigidty. Bola drain in place with sanguinous fluid    Extremities: No peripheral edema, No cyanosis, clubbing     Vascular: Equal and normal pulses: 2+ peripheral pulses throughout    Musculoskeletal: No joint pain, swelling or deformity; no limitation of movement    Skin: No rashes. RUQ abdominal dressing clean/dry/intact      I&O's Detail    2018 07:01  -  2018 07:00  --------------------------------------------------------  IN:    IV PiggyBack: 100 mL    lactated ringers.: 500 mL    lactated ringers.: 1000 mL  Total IN: 1600 mL    OUT:    Bulb: 50 mL    Indwelling Catheter - Urethral: 360 mL  Total OUT: 410 mL    Total NET: 1190 mL      2018 07:01  -  2018 10:07  --------------------------------------------------------  IN:  Total IN: 0 mL    OUT:    Indwelling Catheter - Urethral: 65 mL  Total OUT: 65 mL    Total NET: -65 mL          LABS:                        10.0   12.8  )-----------( 221      ( 2018 03:59 )             29.7     07-    131<L>  |  98  |  23.0<H>  ----------------------------<  192<H>  4.7   |  17.0<L>  |  0.92    Ca    7.7<L>      2018 03:59  Phos  3.9       Mg     2.0         TPro  5.1<L>  /  Alb  2.1<L>  /  TBili  0.6  /  DBili  0.2  /  AST  96<H>  /  ALT  58<H>  /  AlkPhos  85  07-17    PT/INR - ( 2018 15:33 )   PT: 11.5 sec;   INR: 1.04 ratio         PTT - ( 2018 22:17 )  PTT:30.6 sec  Urinalysis Basic - ( 2018 14:46 )    Color: Yellow / Appearance: Clear / S.020 / pH: x  Gluc: x / Ketone: Trace  / Bili: Negative / Urobili: 1 mg/dL   Blood: x / Protein: 100 mg/dL / Nitrite: Negative   Leuk Esterase: Trace / RBC: 0-2 /HPF / WBC 10-15 /HPF   Sq Epi: x / Non Sq Epi: Moderate / Bacteria: Few        RADIOLOGY & ADDITIONAL STUDIES:

## 2018-07-17 NOTE — PROGRESS NOTE ADULT - PROBLEM SELECTOR PLAN 1
-pain control  -encourage IS use  -NPO/IVF  -IV antibiotics  -f/u alfa drain output  -restart home medications when tolerating diet  -keep cooney for strict intake/output monitoring.

## 2018-07-18 DIAGNOSIS — E78.5 HYPERLIPIDEMIA, UNSPECIFIED: ICD-10-CM

## 2018-07-18 DIAGNOSIS — I10 ESSENTIAL (PRIMARY) HYPERTENSION: ICD-10-CM

## 2018-07-18 LAB
ALBUMIN SERPL ELPH-MCNC: 2.2 G/DL — LOW (ref 3.3–5.2)
ALP SERPL-CCNC: 99 U/L — SIGNIFICANT CHANGE UP (ref 40–120)
ALT FLD-CCNC: 41 U/L — HIGH
ANION GAP SERPL CALC-SCNC: 12 MMOL/L — SIGNIFICANT CHANGE UP (ref 5–17)
ANISOCYTOSIS BLD QL: SLIGHT — SIGNIFICANT CHANGE UP
AST SERPL-CCNC: 42 U/L — HIGH
BASOPHILS # BLD AUTO: 0 K/UL — SIGNIFICANT CHANGE UP (ref 0–0.2)
BASOPHILS NFR BLD AUTO: 0.1 % — SIGNIFICANT CHANGE UP (ref 0–2)
BILIRUB DIRECT SERPL-MCNC: 0.2 MG/DL — SIGNIFICANT CHANGE UP (ref 0–0.3)
BILIRUB INDIRECT FLD-MCNC: 0.3 MG/DL — SIGNIFICANT CHANGE UP (ref 0.2–1)
BILIRUB SERPL-MCNC: 0.5 MG/DL — SIGNIFICANT CHANGE UP (ref 0.4–2)
BUN SERPL-MCNC: 21 MG/DL — HIGH (ref 8–20)
CALCIUM SERPL-MCNC: 7.9 MG/DL — LOW (ref 8.6–10.2)
CHLORIDE SERPL-SCNC: 99 MMOL/L — SIGNIFICANT CHANGE UP (ref 98–107)
CO2 SERPL-SCNC: 21 MMOL/L — LOW (ref 22–29)
CREAT SERPL-MCNC: 0.97 MG/DL — SIGNIFICANT CHANGE UP (ref 0.5–1.3)
ELLIPTOCYTES BLD QL SMEAR: SLIGHT — SIGNIFICANT CHANGE UP
EOSINOPHIL # BLD AUTO: 0 K/UL — SIGNIFICANT CHANGE UP (ref 0–0.5)
EOSINOPHIL NFR BLD AUTO: 0.1 % — SIGNIFICANT CHANGE UP (ref 0–6)
GLUCOSE SERPL-MCNC: 213 MG/DL — HIGH (ref 70–115)
HCT VFR BLD CALC: 23.2 % — LOW (ref 37–47)
HGB BLD-MCNC: 7.9 G/DL — LOW (ref 12–16)
HYPOCHROMIA BLD QL: SLIGHT — SIGNIFICANT CHANGE UP
LYMPHOCYTES # BLD AUTO: 1 K/UL — SIGNIFICANT CHANGE UP (ref 1–4.8)
LYMPHOCYTES # BLD AUTO: 8 % — LOW (ref 20–55)
MACROCYTES BLD QL: SLIGHT — SIGNIFICANT CHANGE UP
MAGNESIUM SERPL-MCNC: 2.1 MG/DL — SIGNIFICANT CHANGE UP (ref 1.6–2.6)
MCHC RBC-ENTMCNC: 28.2 PG — SIGNIFICANT CHANGE UP (ref 27–31)
MCHC RBC-ENTMCNC: 34.1 G/DL — SIGNIFICANT CHANGE UP (ref 32–36)
MCV RBC AUTO: 82.9 FL — SIGNIFICANT CHANGE UP (ref 81–99)
MICROCYTES BLD QL: SLIGHT — SIGNIFICANT CHANGE UP
MONOCYTES # BLD AUTO: 1.3 K/UL — HIGH (ref 0–0.8)
MONOCYTES NFR BLD AUTO: 10.7 % — HIGH (ref 3–10)
NEUTROPHILS # BLD AUTO: 9.6 K/UL — HIGH (ref 1.8–8)
NEUTROPHILS NFR BLD AUTO: 79.8 % — HIGH (ref 37–73)
OVALOCYTES BLD QL SMEAR: SLIGHT — SIGNIFICANT CHANGE UP
PHOSPHATE SERPL-MCNC: 2.6 MG/DL — SIGNIFICANT CHANGE UP (ref 2.4–4.7)
PLAT MORPH BLD: NORMAL — SIGNIFICANT CHANGE UP
PLATELET # BLD AUTO: 262 K/UL — SIGNIFICANT CHANGE UP (ref 150–400)
POIKILOCYTOSIS BLD QL AUTO: SLIGHT — SIGNIFICANT CHANGE UP
POTASSIUM SERPL-MCNC: 4.3 MMOL/L — SIGNIFICANT CHANGE UP (ref 3.5–5.3)
POTASSIUM SERPL-SCNC: 4.3 MMOL/L — SIGNIFICANT CHANGE UP (ref 3.5–5.3)
PROT SERPL-MCNC: 5.1 G/DL — LOW (ref 6.6–8.7)
RBC # BLD: 2.8 M/UL — LOW (ref 4.4–5.2)
RBC # FLD: 14.4 % — SIGNIFICANT CHANGE UP (ref 11–15.6)
RBC BLD AUTO: ABNORMAL
SODIUM SERPL-SCNC: 132 MMOL/L — LOW (ref 135–145)
WBC # BLD: 12 K/UL — HIGH (ref 4.8–10.8)
WBC # FLD AUTO: 12 K/UL — HIGH (ref 4.8–10.8)

## 2018-07-18 RX ORDER — ACETAMINOPHEN 500 MG
1000 TABLET ORAL ONCE
Qty: 0 | Refills: 0 | Status: COMPLETED | OUTPATIENT
Start: 2018-07-18 | End: 2018-07-18

## 2018-07-18 RX ORDER — TRAMADOL HYDROCHLORIDE 50 MG/1
25 TABLET ORAL EVERY 6 HOURS
Qty: 0 | Refills: 0 | Status: DISCONTINUED | OUTPATIENT
Start: 2018-07-18 | End: 2018-07-21

## 2018-07-18 RX ORDER — ACETAMINOPHEN 500 MG
650 TABLET ORAL EVERY 6 HOURS
Qty: 0 | Refills: 0 | Status: DISCONTINUED | OUTPATIENT
Start: 2018-07-18 | End: 2018-07-21

## 2018-07-18 RX ADMIN — HEPARIN SODIUM 5000 UNIT(S): 5000 INJECTION INTRAVENOUS; SUBCUTANEOUS at 05:48

## 2018-07-18 RX ADMIN — PIPERACILLIN AND TAZOBACTAM 25 GRAM(S): 4; .5 INJECTION, POWDER, LYOPHILIZED, FOR SOLUTION INTRAVENOUS at 01:57

## 2018-07-18 RX ADMIN — PIPERACILLIN AND TAZOBACTAM 25 GRAM(S): 4; .5 INJECTION, POWDER, LYOPHILIZED, FOR SOLUTION INTRAVENOUS at 17:43

## 2018-07-18 RX ADMIN — ATORVASTATIN CALCIUM 10 MILLIGRAM(S): 80 TABLET, FILM COATED ORAL at 22:00

## 2018-07-18 RX ADMIN — Medication 25 MILLIGRAM(S): at 05:48

## 2018-07-18 RX ADMIN — HEPARIN SODIUM 5000 UNIT(S): 5000 INJECTION INTRAVENOUS; SUBCUTANEOUS at 22:00

## 2018-07-18 RX ADMIN — PIPERACILLIN AND TAZOBACTAM 25 GRAM(S): 4; .5 INJECTION, POWDER, LYOPHILIZED, FOR SOLUTION INTRAVENOUS at 10:07

## 2018-07-18 RX ADMIN — HYDROMORPHONE HYDROCHLORIDE 0.5 MILLIGRAM(S): 2 INJECTION INTRAMUSCULAR; INTRAVENOUS; SUBCUTANEOUS at 01:55

## 2018-07-18 RX ADMIN — HEPARIN SODIUM 5000 UNIT(S): 5000 INJECTION INTRAVENOUS; SUBCUTANEOUS at 13:59

## 2018-07-18 RX ADMIN — Medication 100 MILLIGRAM(S): at 13:59

## 2018-07-18 RX ADMIN — Medication 100 MILLIGRAM(S): at 05:48

## 2018-07-18 RX ADMIN — Medication 100 MILLIGRAM(S): at 22:00

## 2018-07-18 RX ADMIN — HYDROMORPHONE HYDROCHLORIDE 0.5 MILLIGRAM(S): 2 INJECTION INTRAMUSCULAR; INTRAVENOUS; SUBCUTANEOUS at 01:41

## 2018-07-18 NOTE — PROGRESS NOTE ADULT - SUBJECTIVE AND OBJECTIVE BOX
INTERVAL HPI/OVERNIGHT EVENTS: No acute events overnight,  patient c/o of pain over incision sites.  Tolerating clears.  Denies N/V.  Passing flatus, no BM.  Denies fevers and chills.     STATUS POST:  open cholecystectomy    POST OPERATIVE DAY #: 2      MEDICATIONS  (STANDING):  acetaminophen  IVPB. 1000 milliGRAM(s) IV Intermittent once  atorvastatin 10 milliGRAM(s) Oral at bedtime  diltiazem    Tablet 120 milliGRAM(s) Oral daily  docusate sodium 100 milliGRAM(s) Oral three times a day  heparin  Injectable 5000 Unit(s) SubCutaneous every 8 hours  metoprolol succinate ER 25 milliGRAM(s) Oral daily  ondansetron Injectable 4 milliGRAM(s) IV Push once  piperacillin/tazobactam IVPB. 3.375 Gram(s) IV Intermittent every 8 hours    MEDICATIONS  (PRN):  acetaminophen   Tablet. 650 milliGRAM(s) Oral every 6 hours PRN Mild Pain (1 - 3)  senna 2 Tablet(s) Oral at bedtime PRN Constipation  traMADol 25 milliGRAM(s) Oral every 6 hours PRN Moderate Pain (4 - 6)      Vital Signs Last 24 Hrs  T(C): 37 (2018 09:26), Max: 37.9 (2018 18:00)  T(F): 98.6 (2018 09:26), Max: 100.2 (2018 18:00)  HR: 86 (2018 09:26) (65 - 89)  BP: 128/70 (2018 09:26) (99/75 - 134/63)  BP(mean): --  RR: 18 (2018 09:26) (15 - 21)  SpO2: 97% (2018 09:26) (91% - 100%)    Physical Exam:    Neurological:  No sensory/motor deficits    HEENT: PERRL, EOMI, no drainage or redness    Neck: No bruits; no thyromegaly or nodules,  No JVD    Back: Normal spine flexure, No CVA tenderness, No deformity or limitation of movement    Respiratory: Breath Sounds equal & clear to auscultation, no accessory muscle use    Cardiovascular: Regular rate & rhythm, normal S1, S2; no murmurs, gallops or rubs    Gastrointestinal:  Soft, nondistended, tender to palpation over incision site right upper quadrant. No guarding or rigidity. Dressing in place over rush of RUQ, c/d/i.  Bola drain in place with sanguinous fluid    Extremities: No peripheral edema, No cyanosis, clubbing     Vascular: Equal and normal pulses: 2+ peripheral pulses throughout    Musculoskeletal: No joint pain, swelling or deformity; no limitation of movement    Skin: Dressing in place      I&O's Detail    2018 07:01  -  2018 07:00  --------------------------------------------------------  IN:    lactated ringers.: 2400 mL    Oral Fluid: 530 mL    Solution: 150 mL  Total IN: 3080 mL    OUT:    Bulb: 85 mL    Indwelling Catheter - Urethral: 375 mL    Voided: 450 mL  Total OUT: 910 mL    Total NET: 2170 mL          LABS:                        7.9    12.0  )-----------( 262      ( 2018 06:39 )             23.2     07-18    132<L>  |  99  |  21.0<H>  ----------------------------<  213<H>  4.3   |  21.0<L>  |  0.97    Ca    7.9<L>      2018 06:39  Phos  2.6     07-18  Mg     2.1     07-18    TPro  5.1<L>  /  Alb  2.2<L>  /  TBili  0.5  /  DBili  0.2  /  AST  42<H>  /  ALT  41<H>  /  AlkPhos  99  07-18    PT/INR - ( 2018 15:33 )   PT: 11.5 sec;   INR: 1.04 ratio         PTT - ( 2018 22:17 )  PTT:30.6 sec  Urinalysis Basic - ( 2018 14:46 )    Color: Yellow / Appearance: Clear / S.020 / pH: x  Gluc: x / Ketone: Trace  / Bili: Negative / Urobili: 1 mg/dL   Blood: x / Protein: 100 mg/dL / Nitrite: Negative   Leuk Esterase: Trace / RBC: 0-2 /HPF / WBC 10-15 /HPF   Sq Epi: x / Non Sq Epi: Moderate / Bacteria: Few        RADIOLOGY & ADDITIONAL STUDIES:

## 2018-07-18 NOTE — PROGRESS NOTE ADULT - ATTENDING COMMENTS
Seen and examined.  Feels well but w/ some post-op abdominal  pain.  Afebrile.  HD normal.  WBC: 12.8.  LFTs normal.  NAD.  Abdomen: S/ND, incisional TTP, well-healed midline scar.  Dressing CDI.  s/p open cholecystectomy POD#1.  Anticipate she will have SIRS given this was a perforated GB and surrounded by pus.  Will keep abx and alfa drain (monitor output).  Serial abdominal exams.  May have clears.  Continue IVF until tolerates clears.  Keep cooney for now to monitor I/O and if adequate UO will consider removal.  May transfer from PACU to 3tower monitored bed.
Sitting in chair.  Says she has some pain at surgical site.  Tolerated clears.  Afebrile.  HD normal.  WBC: 12.  LFTs normal.  Bola drain 85cc/24hrs serous.  NAD.  Abdomen: S/ND, some incisional TTP, incision clean.  Can advance to regular diet.  Resume home meds.  D/c cooney.  Ambulate.

## 2018-07-18 NOTE — PROGRESS NOTE ADULT - ASSESSMENT
A/p: 82 year old female s/p open cholecystectomy for perforated cholecystitis. Continues to be afebrile, no leukocytosis.     -Pain control  -IV antibiotics continue 4 days  -f/u alfa drain output   -restart home medications when tolerating diet  -D/c DAREN cooney today  -Diet: Clears, once tolerating will advance  -PT/OT ordered

## 2018-07-19 LAB
ANION GAP SERPL CALC-SCNC: 14 MMOL/L — SIGNIFICANT CHANGE UP (ref 5–17)
ANISOCYTOSIS BLD QL: SLIGHT — SIGNIFICANT CHANGE UP
BUN SERPL-MCNC: 16 MG/DL — SIGNIFICANT CHANGE UP (ref 8–20)
BURR CELLS BLD QL SMEAR: PRESENT — SIGNIFICANT CHANGE UP
CALCIUM SERPL-MCNC: 8.2 MG/DL — LOW (ref 8.6–10.2)
CHLORIDE SERPL-SCNC: 98 MMOL/L — SIGNIFICANT CHANGE UP (ref 98–107)
CO2 SERPL-SCNC: 22 MMOL/L — SIGNIFICANT CHANGE UP (ref 22–29)
CREAT SERPL-MCNC: 0.78 MG/DL — SIGNIFICANT CHANGE UP (ref 0.5–1.3)
ELLIPTOCYTES BLD QL SMEAR: SLIGHT — SIGNIFICANT CHANGE UP
EOSINOPHIL NFR BLD AUTO: 2 % — SIGNIFICANT CHANGE UP (ref 0–5)
GLUCOSE SERPL-MCNC: 123 MG/DL — HIGH (ref 70–115)
HCT VFR BLD CALC: 24.5 % — LOW (ref 37–47)
HGB BLD-MCNC: 8.3 G/DL — LOW (ref 12–16)
HYPOCHROMIA BLD QL: SLIGHT — SIGNIFICANT CHANGE UP
LYMPHOCYTES # BLD AUTO: 19 % — LOW (ref 20–55)
MACROCYTES BLD QL: SLIGHT — SIGNIFICANT CHANGE UP
MAGNESIUM SERPL-MCNC: 2 MG/DL — SIGNIFICANT CHANGE UP (ref 1.6–2.6)
MCHC RBC-ENTMCNC: 28.1 PG — SIGNIFICANT CHANGE UP (ref 27–31)
MCHC RBC-ENTMCNC: 33.9 G/DL — SIGNIFICANT CHANGE UP (ref 32–36)
MCV RBC AUTO: 83.1 FL — SIGNIFICANT CHANGE UP (ref 81–99)
MICROCYTES BLD QL: SLIGHT — SIGNIFICANT CHANGE UP
MONOCYTES NFR BLD AUTO: 5 % — SIGNIFICANT CHANGE UP (ref 3–10)
MYELOCYTES NFR BLD: 1 % — HIGH (ref 0–0)
NEUTROPHILS NFR BLD AUTO: 73 % — SIGNIFICANT CHANGE UP (ref 37–73)
OVALOCYTES BLD QL SMEAR: SLIGHT — SIGNIFICANT CHANGE UP
PHOSPHATE SERPL-MCNC: 2.7 MG/DL — SIGNIFICANT CHANGE UP (ref 2.4–4.7)
PLAT MORPH BLD: NORMAL — SIGNIFICANT CHANGE UP
PLATELET # BLD AUTO: 330 K/UL — SIGNIFICANT CHANGE UP (ref 150–400)
POIKILOCYTOSIS BLD QL AUTO: SLIGHT — SIGNIFICANT CHANGE UP
POLYCHROMASIA BLD QL SMEAR: SLIGHT — SIGNIFICANT CHANGE UP
POTASSIUM SERPL-MCNC: 3.8 MMOL/L — SIGNIFICANT CHANGE UP (ref 3.5–5.3)
POTASSIUM SERPL-SCNC: 3.8 MMOL/L — SIGNIFICANT CHANGE UP (ref 3.5–5.3)
RBC # BLD: 2.95 M/UL — LOW (ref 4.4–5.2)
RBC # FLD: 14.1 % — SIGNIFICANT CHANGE UP (ref 11–15.6)
RBC BLD AUTO: ABNORMAL
SODIUM SERPL-SCNC: 134 MMOL/L — LOW (ref 135–145)
TARGETS BLD QL SMEAR: SLIGHT — SIGNIFICANT CHANGE UP
WBC # BLD: 11.8 K/UL — HIGH (ref 4.8–10.8)
WBC # FLD AUTO: 11.8 K/UL — HIGH (ref 4.8–10.8)

## 2018-07-19 RX ORDER — POTASSIUM CHLORIDE 20 MEQ
20 PACKET (EA) ORAL ONCE
Qty: 0 | Refills: 0 | Status: COMPLETED | OUTPATIENT
Start: 2018-07-19 | End: 2018-07-19

## 2018-07-19 RX ADMIN — HEPARIN SODIUM 5000 UNIT(S): 5000 INJECTION INTRAVENOUS; SUBCUTANEOUS at 22:44

## 2018-07-19 RX ADMIN — Medication 25 MILLIGRAM(S): at 04:26

## 2018-07-19 RX ADMIN — PIPERACILLIN AND TAZOBACTAM 25 GRAM(S): 4; .5 INJECTION, POWDER, LYOPHILIZED, FOR SOLUTION INTRAVENOUS at 17:47

## 2018-07-19 RX ADMIN — SENNA PLUS 2 TABLET(S): 8.6 TABLET ORAL at 22:44

## 2018-07-19 RX ADMIN — Medication 650 MILLIGRAM(S): at 04:22

## 2018-07-19 RX ADMIN — Medication 650 MILLIGRAM(S): at 13:34

## 2018-07-19 RX ADMIN — Medication 100 MILLIGRAM(S): at 22:44

## 2018-07-19 RX ADMIN — HEPARIN SODIUM 5000 UNIT(S): 5000 INJECTION INTRAVENOUS; SUBCUTANEOUS at 04:33

## 2018-07-19 RX ADMIN — Medication 650 MILLIGRAM(S): at 13:55

## 2018-07-19 RX ADMIN — PIPERACILLIN AND TAZOBACTAM 25 GRAM(S): 4; .5 INJECTION, POWDER, LYOPHILIZED, FOR SOLUTION INTRAVENOUS at 09:30

## 2018-07-19 RX ADMIN — HEPARIN SODIUM 5000 UNIT(S): 5000 INJECTION INTRAVENOUS; SUBCUTANEOUS at 13:34

## 2018-07-19 RX ADMIN — Medication 100 MILLIGRAM(S): at 13:34

## 2018-07-19 RX ADMIN — Medication 100 MILLIGRAM(S): at 04:26

## 2018-07-19 RX ADMIN — PIPERACILLIN AND TAZOBACTAM 25 GRAM(S): 4; .5 INJECTION, POWDER, LYOPHILIZED, FOR SOLUTION INTRAVENOUS at 02:09

## 2018-07-19 RX ADMIN — Medication 20 MILLIEQUIVALENT(S): at 09:30

## 2018-07-19 RX ADMIN — ATORVASTATIN CALCIUM 10 MILLIGRAM(S): 80 TABLET, FILM COATED ORAL at 22:44

## 2018-07-19 NOTE — PHYSICAL THERAPY INITIAL EVALUATION ADULT - ADDITIONAL COMMENTS
Pt. reports she lives in a house with her daughter. Her daughter is at home during the day and able to assist. pt. has one small step to enter and no stairs inside. Pt. does not own DME and was independent PTA.

## 2018-07-19 NOTE — PHYSICAL THERAPY INITIAL EVALUATION ADULT - CRITERIA FOR SKILLED THERAPEUTIC INTERVENTIONS
risk reduction/prevention/therapy frequency/functional limitations in following categories/predicted duration of therapy intervention/rehab potential/anticipated equipment needs at discharge/anticipated discharge recommendation/impairments found

## 2018-07-19 NOTE — PROGRESS NOTE ADULT - ASSESSMENT
82 year old female s/p open cholecystectomy for perforated cholecystitis. Continues to be afebrile, no leukocytosis.     -Pain control  -IV antibiotics continue 4 days (7/17-7/20)  -f/u alfa drain output   -restart home medications when tolerating diet  -Diet: Clears, once tolerating will advance  -f/u PT/OT

## 2018-07-19 NOTE — PROGRESS NOTE ADULT - SUBJECTIVE AND OBJECTIVE BOX
HPI/OVERNIGHT EVENTS:  Patient is in significant pain. Otherwise was able to try some of her CLD. Denies nausea/vomiting, fevers, chills, CP, sob. VSS. Urine output adequate. Drain: 50 serosang.      MEDICATIONS  (STANDING):  acetaminophen  IVPB. 1000 milliGRAM(s) IV Intermittent once  atorvastatin 10 milliGRAM(s) Oral at bedtime  diltiazem    Tablet 120 milliGRAM(s) Oral daily  docusate sodium 100 milliGRAM(s) Oral three times a day  heparin  Injectable 5000 Unit(s) SubCutaneous every 8 hours  metoprolol succinate ER 25 milliGRAM(s) Oral daily  ondansetron Injectable 4 milliGRAM(s) IV Push once  piperacillin/tazobactam IVPB. 3.375 Gram(s) IV Intermittent every 8 hours    MEDICATIONS  (PRN):  acetaminophen   Tablet. 650 milliGRAM(s) Oral every 6 hours PRN Mild Pain (1 - 3)  senna 2 Tablet(s) Oral at bedtime PRN Constipation  traMADol 25 milliGRAM(s) Oral every 6 hours PRN Moderate Pain (4 - 6)      Vital Signs Last 24 Hrs  T(C): 36.7 (19 Jul 2018 08:43), Max: 36.9 (18 Jul 2018 21:14)  T(F): 98 (19 Jul 2018 08:43), Max: 98.5 (18 Jul 2018 21:14)  HR: 65 (19 Jul 2018 08:43) (61 - 83)  BP: 125/69 (19 Jul 2018 08:43) (125/69 - 157/61)  BP(mean): --  RR: 18 (19 Jul 2018 08:43) (17 - 19)  SpO2: 100% (19 Jul 2018 08:43) (95% - 100%)    Constitutional: patient resting comfortably in bed, in no acute distress  HEENT: PERRL, EOMI, no drainage or redness  Neck: No bruits; no thyromegaly or nodules,  No JVD  Respiratory: Breath Sounds equal & clear to auscultation, no accessory muscle use  Cardiovascular: Regular rate & rhythm, normal S1, S2; no murmurs, gallops or rubs  Gastrointestinal:  Soft, nondistended, tender to palpation over incision site right upper quadrant. No guarding or rigidity. Dressing in place over rush of RUQ, c/d/i.  Bola drain in place with sanguinous fluid  Extremities: No peripheral edema, No cyanosis, clubbing   Vascular: Equal and normal pulses: 2+ peripheral pulses throughout  Musculoskeletal: No joint pain, swelling or deformity; no limitation of movement  Skin: Dressing in place      I&O's Detail    18 Jul 2018 07:01  -  19 Jul 2018 07:00  --------------------------------------------------------  IN:    Solution: 175 mL  Total IN: 175 mL    OUT:    Bulb: 50 mL    Voided: 856 mL  Total OUT: 906 mL    Total NET: -731 mL      19 Jul 2018 07:01  -  19 Jul 2018 10:45  --------------------------------------------------------  IN:  Total IN: 0 mL    OUT:    Bulb: 5 mL  Total OUT: 5 mL    Total NET: -5 mL          LABS:                        8.3    11.8  )-----------( 330      ( 19 Jul 2018 05:50 )             24.5     07-19    134<L>  |  98  |  16.0  ----------------------------<  123<H>  3.8   |  22.0  |  0.78    Ca    8.2<L>      19 Jul 2018 05:50  Phos  2.7     07-19  Mg     2.0     07-19    TPro  5.1<L>  /  Alb  2.2<L>  /  TBili  0.5  /  DBili  0.2  /  AST  42<H>  /  ALT  41<H>  /  AlkPhos  99  07-18

## 2018-07-20 LAB
ALBUMIN SERPL ELPH-MCNC: 2.6 G/DL — LOW (ref 3.3–5.2)
ALP SERPL-CCNC: 193 U/L — HIGH (ref 40–120)
ALT FLD-CCNC: 56 U/L — HIGH
ANION GAP SERPL CALC-SCNC: 10 MMOL/L — SIGNIFICANT CHANGE UP (ref 5–17)
ANION GAP SERPL CALC-SCNC: 13 MMOL/L — SIGNIFICANT CHANGE UP (ref 5–17)
AST SERPL-CCNC: 48 U/L — HIGH
BILIRUB SERPL-MCNC: 0.5 MG/DL — SIGNIFICANT CHANGE UP (ref 0.4–2)
BUN SERPL-MCNC: 10 MG/DL — SIGNIFICANT CHANGE UP (ref 8–20)
BUN SERPL-MCNC: 12 MG/DL — SIGNIFICANT CHANGE UP (ref 8–20)
BURR CELLS BLD QL SMEAR: PRESENT — SIGNIFICANT CHANGE UP
CALCIUM SERPL-MCNC: 8.2 MG/DL — LOW (ref 8.6–10.2)
CALCIUM SERPL-MCNC: 8.3 MG/DL — LOW (ref 8.6–10.2)
CHLORIDE SERPL-SCNC: 100 MMOL/L — SIGNIFICANT CHANGE UP (ref 98–107)
CHLORIDE SERPL-SCNC: 97 MMOL/L — LOW (ref 98–107)
CO2 SERPL-SCNC: 24 MMOL/L — SIGNIFICANT CHANGE UP (ref 22–29)
CO2 SERPL-SCNC: 25 MMOL/L — SIGNIFICANT CHANGE UP (ref 22–29)
CREAT SERPL-MCNC: 0.77 MG/DL — SIGNIFICANT CHANGE UP (ref 0.5–1.3)
CREAT SERPL-MCNC: 0.81 MG/DL — SIGNIFICANT CHANGE UP (ref 0.5–1.3)
ELLIPTOCYTES BLD QL SMEAR: SLIGHT — SIGNIFICANT CHANGE UP
EOSINOPHIL # BLD AUTO: 0.3 K/UL — SIGNIFICANT CHANGE UP (ref 0–0.5)
EOSINOPHIL NFR BLD AUTO: 5 % — SIGNIFICANT CHANGE UP (ref 0–5)
GLUCOSE SERPL-MCNC: 100 MG/DL — SIGNIFICANT CHANGE UP (ref 70–115)
GLUCOSE SERPL-MCNC: 153 MG/DL — HIGH (ref 70–115)
HCT VFR BLD CALC: 24 % — LOW (ref 37–47)
HGB BLD-MCNC: 8 G/DL — LOW (ref 12–16)
HYPOCHROMIA BLD QL: SLIGHT — SIGNIFICANT CHANGE UP
LYMPHOCYTES # BLD AUTO: 19 % — LOW (ref 20–55)
LYMPHOCYTES # BLD AUTO: 2.4 K/UL — SIGNIFICANT CHANGE UP (ref 1–4.8)
MAGNESIUM SERPL-MCNC: 1.8 MG/DL — SIGNIFICANT CHANGE UP (ref 1.6–2.6)
MCHC RBC-ENTMCNC: 28.4 PG — SIGNIFICANT CHANGE UP (ref 27–31)
MCHC RBC-ENTMCNC: 33.3 G/DL — SIGNIFICANT CHANGE UP (ref 32–36)
MCV RBC AUTO: 85.1 FL — SIGNIFICANT CHANGE UP (ref 81–99)
METAMYELOCYTES # FLD: 2 % — HIGH (ref 0–0)
MONOCYTES # BLD AUTO: 1 K/UL — HIGH (ref 0–0.8)
MONOCYTES NFR BLD AUTO: 8 % — SIGNIFICANT CHANGE UP (ref 3–10)
MYELOCYTES NFR BLD: 4 % — HIGH (ref 0–0)
NEUTROPHILS # BLD AUTO: 8.1 K/UL — HIGH (ref 1.8–8)
NEUTROPHILS NFR BLD AUTO: 57 % — SIGNIFICANT CHANGE UP (ref 37–73)
NEUTS BAND # BLD: 5 % — SIGNIFICANT CHANGE UP (ref 0–8)
NRBC # BLD: 2 /100 — HIGH (ref 0–0)
OVALOCYTES BLD QL SMEAR: SLIGHT — SIGNIFICANT CHANGE UP
PLAT MORPH BLD: NORMAL — SIGNIFICANT CHANGE UP
PLATELET # BLD AUTO: 381 K/UL — SIGNIFICANT CHANGE UP (ref 150–400)
POIKILOCYTOSIS BLD QL AUTO: SLIGHT — SIGNIFICANT CHANGE UP
POLYCHROMASIA BLD QL SMEAR: SLIGHT — SIGNIFICANT CHANGE UP
POTASSIUM SERPL-MCNC: 3.5 MMOL/L — SIGNIFICANT CHANGE UP (ref 3.5–5.3)
POTASSIUM SERPL-MCNC: 4 MMOL/L — SIGNIFICANT CHANGE UP (ref 3.5–5.3)
POTASSIUM SERPL-SCNC: 3.5 MMOL/L — SIGNIFICANT CHANGE UP (ref 3.5–5.3)
POTASSIUM SERPL-SCNC: 4 MMOL/L — SIGNIFICANT CHANGE UP (ref 3.5–5.3)
PROT SERPL-MCNC: 5.7 G/DL — LOW (ref 6.6–8.7)
RBC # BLD: 2.82 M/UL — LOW (ref 4.4–5.2)
RBC # FLD: 14 % — SIGNIFICANT CHANGE UP (ref 11–15.6)
RBC BLD AUTO: ABNORMAL
SODIUM SERPL-SCNC: 134 MMOL/L — LOW (ref 135–145)
SODIUM SERPL-SCNC: 135 MMOL/L — SIGNIFICANT CHANGE UP (ref 135–145)
TARGETS BLD QL SMEAR: SLIGHT — SIGNIFICANT CHANGE UP
WBC # BLD: 12.8 K/UL — HIGH (ref 4.8–10.8)
WBC # FLD AUTO: 12.8 K/UL — HIGH (ref 4.8–10.8)

## 2018-07-20 PROCEDURE — 76705 ECHO EXAM OF ABDOMEN: CPT | Mod: 26

## 2018-07-20 RX ORDER — MAGNESIUM SULFATE 500 MG/ML
2 VIAL (ML) INJECTION ONCE
Qty: 0 | Refills: 0 | Status: COMPLETED | OUTPATIENT
Start: 2018-07-20 | End: 2018-07-20

## 2018-07-20 RX ORDER — DILTIAZEM HCL 120 MG
120 CAPSULE, EXT RELEASE 24 HR ORAL DAILY
Qty: 0 | Refills: 0 | Status: DISCONTINUED | OUTPATIENT
Start: 2018-07-21 | End: 2018-07-21

## 2018-07-20 RX ADMIN — Medication 100 MILLIGRAM(S): at 05:59

## 2018-07-20 RX ADMIN — HEPARIN SODIUM 5000 UNIT(S): 5000 INJECTION INTRAVENOUS; SUBCUTANEOUS at 23:27

## 2018-07-20 RX ADMIN — PIPERACILLIN AND TAZOBACTAM 25 GRAM(S): 4; .5 INJECTION, POWDER, LYOPHILIZED, FOR SOLUTION INTRAVENOUS at 10:17

## 2018-07-20 RX ADMIN — Medication 650 MILLIGRAM(S): at 05:00

## 2018-07-20 RX ADMIN — HEPARIN SODIUM 5000 UNIT(S): 5000 INJECTION INTRAVENOUS; SUBCUTANEOUS at 05:59

## 2018-07-20 RX ADMIN — TRAMADOL HYDROCHLORIDE 25 MILLIGRAM(S): 50 TABLET ORAL at 02:00

## 2018-07-20 RX ADMIN — PIPERACILLIN AND TAZOBACTAM 25 GRAM(S): 4; .5 INJECTION, POWDER, LYOPHILIZED, FOR SOLUTION INTRAVENOUS at 01:21

## 2018-07-20 RX ADMIN — Medication 100 MILLIGRAM(S): at 14:24

## 2018-07-20 RX ADMIN — Medication 650 MILLIGRAM(S): at 23:27

## 2018-07-20 RX ADMIN — TRAMADOL HYDROCHLORIDE 25 MILLIGRAM(S): 50 TABLET ORAL at 01:21

## 2018-07-20 RX ADMIN — Medication 50 GRAM(S): at 18:10

## 2018-07-20 RX ADMIN — HEPARIN SODIUM 5000 UNIT(S): 5000 INJECTION INTRAVENOUS; SUBCUTANEOUS at 14:24

## 2018-07-20 RX ADMIN — Medication 25 MILLIGRAM(S): at 05:59

## 2018-07-20 RX ADMIN — Medication 100 MILLIGRAM(S): at 23:27

## 2018-07-20 RX ADMIN — PIPERACILLIN AND TAZOBACTAM 25 GRAM(S): 4; .5 INJECTION, POWDER, LYOPHILIZED, FOR SOLUTION INTRAVENOUS at 17:39

## 2018-07-20 RX ADMIN — ATORVASTATIN CALCIUM 10 MILLIGRAM(S): 80 TABLET, FILM COATED ORAL at 23:27

## 2018-07-20 NOTE — PROGRESS NOTE ADULT - SUBJECTIVE AND OBJECTIVE BOX
ACS/Trauma Progress Note  No acute events overnight. afebrile, VSS. Tolerating her regular diet. Drain emptying bile stained fluid, changed from yesterdays serosanguinous output. Denies fever/chills/nausea/vomiting/cp/sob.       MEDICATIONS  (STANDING):  atorvastatin 10 milliGRAM(s) Oral at bedtime  diltiazem    Tablet 120 milliGRAM(s) Oral daily  docusate sodium 100 milliGRAM(s) Oral three times a day  heparin  Injectable 5000 Unit(s) SubCutaneous every 8 hours  metoprolol succinate ER 25 milliGRAM(s) Oral daily  ondansetron Injectable 4 milliGRAM(s) IV Push once  piperacillin/tazobactam IVPB. 3.375 Gram(s) IV Intermittent every 8 hours    MEDICATIONS  (PRN):  acetaminophen   Tablet. 650 milliGRAM(s) Oral every 6 hours PRN Mild Pain (1 - 3)  senna 2 Tablet(s) Oral at bedtime PRN Constipation  traMADol 25 milliGRAM(s) Oral every 6 hours PRN Moderate Pain (4 - 6)      Vital Signs Last 24 Hrs  T(C): 37 (20 Jul 2018 08:06), Max: 37.2 (19 Jul 2018 20:01)  T(F): 98.6 (20 Jul 2018 08:06), Max: 98.9 (19 Jul 2018 20:01)  HR: 62 (20 Jul 2018 08:06) (62 - 73)  BP: 131/62 (20 Jul 2018 08:06) (131/62 - 154/63)  BP(mean): --  RR: 20 (20 Jul 2018 08:06) (18 - 20)  SpO2: 96% (20 Jul 2018 08:06) (96% - 99%)    Constitutional: patient resting comfortably in bed, in no acute distress  HEENT: PERRL, EOMI, no drainage or redness  Neck: No bruits; no thyromegaly or nodules,  No JVD  Respiratory: Breath Sounds equal & clear to auscultation, no accessory muscle use  Cardiovascular: Regular rate & rhythm, normal S1, S2; no murmurs, gallops or rubs  Gastrointestinal:  Soft, nondistended, tender to palpation over incision site right upper quadrant. No guarding or rigidity. Dressing in place over rush of RUQ, c/d/i.  Bola drain in place with bile stained fluid  Extremities: No peripheral edema, No cyanosis, clubbing   Vascular: Equal and normal pulses: 2+ peripheral pulses throughout  Musculoskeletal: No joint pain, swelling or deformity; no limitation of movement  Skin: Dressing in place    I&O's Detail    19 Jul 2018 07:01  -  20 Jul 2018 07:00  --------------------------------------------------------  IN:  Total IN: 0 mL    OUT:    Bulb: 95 mL    Voided: 1200 mL  Total OUT: 1295 mL    Total NET: -1295 mL          LABS:                        8.0    12.8  )-----------( 381      ( 20 Jul 2018 05:42 )             24.0     07-20    135  |  100  |  12.0  ----------------------------<  100  4.0   |  25.0  |  0.77    Ca    8.2<L>      20 Jul 2018 05:42  Phos  2.7     07-19  Mg     1.8     07-20

## 2018-07-20 NOTE — PROGRESS NOTE ADULT - ASSESSMENT
82 year old female s/p open cholecystectomy for perforated cholecystitis. Continues to be afebrile, no leukocytosis. However, change in drain character concerning for bile leak.     -Pain control  -IV antibiotics continue 4 days (7/17-7/20)  -f/u alfa drain output and character.   -restart home medications when tolerating diet  -Diet: Clears, once tolerating will advance  -f/u PT/OT

## 2018-07-21 ENCOUNTER — TRANSCRIPTION ENCOUNTER (OUTPATIENT)
Age: 83
End: 2018-07-21

## 2018-07-21 VITALS
SYSTOLIC BLOOD PRESSURE: 132 MMHG | OXYGEN SATURATION: 96 % | RESPIRATION RATE: 18 BRPM | DIASTOLIC BLOOD PRESSURE: 58 MMHG | TEMPERATURE: 98 F | HEART RATE: 70 BPM

## 2018-07-21 LAB
ALBUMIN SERPL ELPH-MCNC: 2.4 G/DL — LOW (ref 3.3–5.2)
ALP SERPL-CCNC: 197 U/L — HIGH (ref 40–120)
ALT FLD-CCNC: 56 U/L — HIGH
ANION GAP SERPL CALC-SCNC: 14 MMOL/L — SIGNIFICANT CHANGE UP (ref 5–17)
ANISOCYTOSIS BLD QL: SLIGHT — SIGNIFICANT CHANGE UP
AST SERPL-CCNC: 51 U/L — HIGH
BASOPHILS NFR BLD AUTO: 1 % — SIGNIFICANT CHANGE UP (ref 0–2)
BILIRUB SERPL-MCNC: 0.5 MG/DL — SIGNIFICANT CHANGE UP (ref 0.4–2)
BUN SERPL-MCNC: 10 MG/DL — SIGNIFICANT CHANGE UP (ref 8–20)
CALCIUM SERPL-MCNC: 7.9 MG/DL — LOW (ref 8.6–10.2)
CHLORIDE SERPL-SCNC: 98 MMOL/L — SIGNIFICANT CHANGE UP (ref 98–107)
CO2 SERPL-SCNC: 24 MMOL/L — SIGNIFICANT CHANGE UP (ref 22–29)
CREAT SERPL-MCNC: 0.81 MG/DL — SIGNIFICANT CHANGE UP (ref 0.5–1.3)
ELLIPTOCYTES BLD QL SMEAR: SLIGHT — SIGNIFICANT CHANGE UP
EOSINOPHIL NFR BLD AUTO: 1 % — SIGNIFICANT CHANGE UP (ref 0–5)
GLUCOSE SERPL-MCNC: 138 MG/DL — HIGH (ref 70–115)
HCT VFR BLD CALC: 23.8 % — LOW (ref 37–47)
HGB BLD-MCNC: 7.9 G/DL — LOW (ref 12–16)
HYPOCHROMIA BLD QL: SLIGHT — SIGNIFICANT CHANGE UP
LYMPHOCYTES # BLD AUTO: 20 % — SIGNIFICANT CHANGE UP (ref 20–55)
MACROCYTES BLD QL: SLIGHT — SIGNIFICANT CHANGE UP
MAGNESIUM SERPL-MCNC: 2.2 MG/DL — SIGNIFICANT CHANGE UP (ref 1.6–2.6)
MCHC RBC-ENTMCNC: 28.4 PG — SIGNIFICANT CHANGE UP (ref 27–31)
MCHC RBC-ENTMCNC: 33.2 G/DL — SIGNIFICANT CHANGE UP (ref 32–36)
MCV RBC AUTO: 85.6 FL — SIGNIFICANT CHANGE UP (ref 81–99)
MICROCYTES BLD QL: SLIGHT — SIGNIFICANT CHANGE UP
MONOCYTES NFR BLD AUTO: 5 % — SIGNIFICANT CHANGE UP (ref 3–10)
MYELOCYTES NFR BLD: 1 % — HIGH (ref 0–0)
NEUTROPHILS NFR BLD AUTO: 64 % — SIGNIFICANT CHANGE UP (ref 37–73)
NRBC # BLD: 1 /100 — HIGH (ref 0–0)
PLAT MORPH BLD: NORMAL — SIGNIFICANT CHANGE UP
PLATELET # BLD AUTO: 460 K/UL — HIGH (ref 150–400)
POIKILOCYTOSIS BLD QL AUTO: SLIGHT — SIGNIFICANT CHANGE UP
POTASSIUM SERPL-MCNC: 3.3 MMOL/L — LOW (ref 3.5–5.3)
POTASSIUM SERPL-SCNC: 3.3 MMOL/L — LOW (ref 3.5–5.3)
PROT SERPL-MCNC: 5.3 G/DL — LOW (ref 6.6–8.7)
RBC # BLD: 2.78 M/UL — LOW (ref 4.4–5.2)
RBC # FLD: 14.2 % — SIGNIFICANT CHANGE UP (ref 11–15.6)
RBC BLD AUTO: ABNORMAL
SODIUM SERPL-SCNC: 136 MMOL/L — SIGNIFICANT CHANGE UP (ref 135–145)
VARIANT LYMPHS # BLD: 8 % — HIGH (ref 0–6)
WBC # BLD: 12.3 K/UL — HIGH (ref 4.8–10.8)
WBC # FLD AUTO: 12.3 K/UL — HIGH (ref 4.8–10.8)

## 2018-07-21 PROCEDURE — 84484 ASSAY OF TROPONIN QUANT: CPT

## 2018-07-21 PROCEDURE — 71045 X-RAY EXAM CHEST 1 VIEW: CPT

## 2018-07-21 PROCEDURE — 81001 URINALYSIS AUTO W/SCOPE: CPT

## 2018-07-21 PROCEDURE — 86900 BLOOD TYPING SEROLOGIC ABO: CPT

## 2018-07-21 PROCEDURE — 80048 BASIC METABOLIC PNL TOTAL CA: CPT

## 2018-07-21 PROCEDURE — 97163 PT EVAL HIGH COMPLEX 45 MIN: CPT

## 2018-07-21 PROCEDURE — 96375 TX/PRO/DX INJ NEW DRUG ADDON: CPT | Mod: XU

## 2018-07-21 PROCEDURE — 36415 COLL VENOUS BLD VENIPUNCTURE: CPT

## 2018-07-21 PROCEDURE — 86850 RBC ANTIBODY SCREEN: CPT

## 2018-07-21 PROCEDURE — 88304 TISSUE EXAM BY PATHOLOGIST: CPT

## 2018-07-21 PROCEDURE — 83605 ASSAY OF LACTIC ACID: CPT

## 2018-07-21 PROCEDURE — 86923 COMPATIBILITY TEST ELECTRIC: CPT

## 2018-07-21 PROCEDURE — 99285 EMERGENCY DEPT VISIT HI MDM: CPT | Mod: 25

## 2018-07-21 PROCEDURE — 93005 ELECTROCARDIOGRAM TRACING: CPT

## 2018-07-21 PROCEDURE — 97116 GAIT TRAINING THERAPY: CPT

## 2018-07-21 PROCEDURE — 36430 TRANSFUSION BLD/BLD COMPNT: CPT

## 2018-07-21 PROCEDURE — 84100 ASSAY OF PHOSPHORUS: CPT

## 2018-07-21 PROCEDURE — 83690 ASSAY OF LIPASE: CPT

## 2018-07-21 PROCEDURE — 83735 ASSAY OF MAGNESIUM: CPT

## 2018-07-21 PROCEDURE — 74177 CT ABD & PELVIS W/CONTRAST: CPT

## 2018-07-21 PROCEDURE — 96374 THER/PROPH/DIAG INJ IV PUSH: CPT | Mod: XU

## 2018-07-21 PROCEDURE — 85610 PROTHROMBIN TIME: CPT

## 2018-07-21 PROCEDURE — 76705 ECHO EXAM OF ABDOMEN: CPT

## 2018-07-21 PROCEDURE — 97530 THERAPEUTIC ACTIVITIES: CPT

## 2018-07-21 PROCEDURE — 85730 THROMBOPLASTIN TIME PARTIAL: CPT

## 2018-07-21 PROCEDURE — 80076 HEPATIC FUNCTION PANEL: CPT

## 2018-07-21 PROCEDURE — 86901 BLOOD TYPING SEROLOGIC RH(D): CPT

## 2018-07-21 PROCEDURE — 80053 COMPREHEN METABOLIC PANEL: CPT

## 2018-07-21 PROCEDURE — T1013: CPT

## 2018-07-21 PROCEDURE — P9016: CPT

## 2018-07-21 PROCEDURE — 85027 COMPLETE CBC AUTOMATED: CPT

## 2018-07-21 RX ORDER — TRAMADOL HYDROCHLORIDE 50 MG/1
0.5 TABLET ORAL
Qty: 0 | Refills: 0 | DISCHARGE
Start: 2018-07-21

## 2018-07-21 RX ORDER — ACETAMINOPHEN 500 MG
2 TABLET ORAL
Qty: 0 | Refills: 0 | COMMUNITY
Start: 2018-07-21

## 2018-07-21 RX ADMIN — Medication 120 MILLIGRAM(S): at 05:21

## 2018-07-21 RX ADMIN — HEPARIN SODIUM 5000 UNIT(S): 5000 INJECTION INTRAVENOUS; SUBCUTANEOUS at 05:21

## 2018-07-21 RX ADMIN — PIPERACILLIN AND TAZOBACTAM 25 GRAM(S): 4; .5 INJECTION, POWDER, LYOPHILIZED, FOR SOLUTION INTRAVENOUS at 01:47

## 2018-07-21 RX ADMIN — Medication 100 MILLIGRAM(S): at 05:21

## 2018-07-21 RX ADMIN — Medication 25 MILLIGRAM(S): at 05:21

## 2018-07-21 RX ADMIN — PIPERACILLIN AND TAZOBACTAM 25 GRAM(S): 4; .5 INJECTION, POWDER, LYOPHILIZED, FOR SOLUTION INTRAVENOUS at 09:43

## 2018-07-21 RX ADMIN — Medication 650 MILLIGRAM(S): at 00:00

## 2018-07-21 NOTE — DISCHARGE NOTE ADULT - CARE PROVIDER_API CALL
[facility] Clinic,   [address] [facility], [address]st [place] [address]  Phone: [phone]  Fax: (   )    -

## 2018-07-21 NOTE — DISCHARGE NOTE ADULT - PROVIDER TOKENS
FREE:[LAST:[[facility] Clinic],PHONE:[phone]],FAX:[(   )    -],ADDRESS:[[facility] [address] [facility], [address] [place] [address]]]

## 2018-07-21 NOTE — DISCHARGE NOTE ADULT - CARE PLAN
Principal Discharge DX:	Perforated gallbladder  Goal:	Pain management  Assessment and plan of treatment:	You had a perforated cholecystitis with purulent bile. Your gallbladder was removed and a drain was left. Please continue to take all your home medications as prescribed by your primary care physician. For your incisional pain, you can take tylenol and ultram as prescribed. Please continue to leave the bulb in place until you follow up with the ACS clinic in 7-10 days. As the nurse as taught you, please document how much you are draining every day and the quality of the fluid. If you begin to experience fever, nausea, vomiting, increasing abdominal pain, or you see that the wound is increasing in drainage, bleeding, or notice pus, please go to the nearest ED.    [facility] Clinic: [phone]. [address] [facility], 1st [address] [place] [id number]

## 2018-07-21 NOTE — DISCHARGE NOTE ADULT - PLAN OF CARE
Pain management You had a perforated cholecystitis with purulent bile. Your gallbladder was removed and a drain was left. Please continue to take all your home medications as prescribed by your primary care physician. For your incisional pain, you can take tylenol and ultram as prescribed. Please continue to leave the bulb in place until you follow up with the ACS clinic in 7-10 days. As the nurse as taught you, please document how much you are draining every day and the quality of the fluid. If you begin to experience fever, nausea, vomiting, increasing abdominal pain, or you see that the wound is increasing in drainage, bleeding, or notice pus, please go to the nearest ED.    [facility] Clinic: [phone]. [address] [facility], 1st [address] [place] [id number]

## 2018-07-21 NOTE — DISCHARGE NOTE ADULT - MEDICATION SUMMARY - MEDICATIONS TO TAKE
I will START or STAY ON the medications listed below when I get home from the hospital:    zetia  -- 1 tab(s) by mouth once a day  -- Indication: For Hyperlipidemia    rolling walker  -- Indication: For Prophylactic measure    acetaminophen 325 mg oral tablet  -- 2 tab(s) by mouth every 6 hours, As needed, Mild Pain (1 - 3)  -- Indication: For Perforated gallbladder    traMADol 50 mg oral tablet  -- 0.5 tab(s) by mouth every 6 hours, As needed, Moderate Pain (4 - 6)  -- Indication: For Perforated gallbladder    Aspir 81 81 mg oral tablet  -- 1 tab(s) by mouth once a day  -- Indication: For Coronary artery disease involving native coronary artery of native heart without angina pectoris    lisinopril 2.5 mg oral tablet  -- 1 tab(s) by mouth once a day  -- Indication: For Hypertension    diltiazem 120 mg oral tablet  -- 1 tab(s) by mouth once a day  -- Indication: For Coronary artery disease involving native coronary artery of native heart without angina pectoris    atorvastatin 10 mg oral tablet  -- 1 tab(s) by mouth once a day (at bedtime)  -- Indication: For Hyperlipidemia    Plavix 75 mg oral tablet  -- 1 tab(s) by mouth once a day  -- Indication: For Coronary artery disease involving native coronary artery of native heart without angina pectoris    Vistaril  -- 1 tab(s) by mouth 2 times a day  -- Indication: For Prophylactic measure    metoprolol succinate 25 mg oral tablet, extended release  -- 1 tab(s) by mouth once a day  -- Indication: For Hypertension

## 2018-07-21 NOTE — DISCHARGE NOTE ADULT - HOSPITAL COURSE
82YOF with CAD, s/p CABG and multiple cardiac stents c/o 4 days of right upper quadrant pain. Currently afebrile, hemodynamically stable, right upper quadrant and right lower quadrant tenderness. Leukocytosis. CT scan concerning for perforated cholecystitis. Also CBD stone noted on CT scan with normal LFTs.  Discussed with patient need for urgent cholecystectomy, possible cholangiogram, and possible ventral hernia repair.     Patient was taken back to the OR for a open cholecystectomy. She was found to have a perforated gallbladder with purulent bile, RUQ phlegmon and bleeding from the liver. The bleeding was controlled and a drain was placed. On 7/20, POD 4, the drain was putting out bilious fluid and the patient was monitored overnight. Labs remained stable and the bilious output decreased and therefore the patient was discharged was drains in place.

## 2018-07-21 NOTE — DISCHARGE NOTE ADULT - PATIENT PORTAL LINK FT
You can access the [email or web address], offered by [facility], by registering with the following website: [email or web address]

## 2018-07-23 ENCOUNTER — EMERGENCY (EMERGENCY)
Facility: HOSPITAL | Age: 83
LOS: 1 days | Discharge: HOME | End: 2018-07-23
Attending: EMERGENCY MEDICINE
Payer: COMMERCIAL

## 2018-07-23 VITALS
OXYGEN SATURATION: 96 % | TEMPERATURE: 99 F | RESPIRATION RATE: 18 BRPM | HEART RATE: 68 BPM | SYSTOLIC BLOOD PRESSURE: 146 MMHG | WEIGHT: 119.93 LBS | DIASTOLIC BLOOD PRESSURE: 53 MMHG | HEIGHT: 57 IN

## 2018-07-23 PROCEDURE — 99283 EMERGENCY DEPT VISIT LOW MDM: CPT

## 2018-07-23 PROCEDURE — 99284 EMERGENCY DEPT VISIT MOD MDM: CPT | Mod: 25

## 2018-07-23 NOTE — ED STATDOCS - PROGRESS NOTE DETAILS
pt was seen by surgery and it as felt that the drainage was adaquate and she has no pain and will follow up in the office in one week

## 2018-07-23 NOTE — CONSULT NOTE ADULT - SUBJECTIVE AND OBJECTIVE BOX
ACUTE CARE SURGERY CONSULT      HPI: y/o F s/p presenting with consistently high drain output and generalized abdominal pain. Patient states that her pain has not worsened since discharge, but was concerned because her drain output has been >100mL/day, light green output. Denies F/C/N/V/CP/SOB. No change from usual bowel habits. Tolerating diet. OOB and ambulating at home      PAST MEDICAL HISTORY:  Osteoarthritis  Osteopenia  Breast cancer  Hyperlipidemia  Hypertension  Carotid stenosis  CAD (coronary artery disease)      PAST SURGICAL HISTORY:  H/O benign breast biopsy  S/P lumpectomy, right breast  S/P CABG x 3  Open cholecystectomy      ALLERGIES:  No Known Allergies    FAMILY HISTORY:    SOCIAL HISTORY:    HOME MEDICATIONS:    MEDICATIONS  (STANDING):    MEDICATIONS  (PRN):      VITALS & I/Os:  Vital Signs Last 24 Hrs  T(C): 37.1 (23 Jul 2018 13:04), Max: 37.1 (23 Jul 2018 13:04)  T(F): 98.8 (23 Jul 2018 13:04), Max: 98.8 (23 Jul 2018 13:04)  HR: 68 (23 Jul 2018 13:04) (68 - 68)  BP: 146/53 (23 Jul 2018 13:04) (146/53 - 146/53)  BP(mean): --  RR: 18 (23 Jul 2018 13:04) (18 - 18)  SpO2: 96% (23 Jul 2018 13:04) (96% - 96%)  CAPILLARY BLOOD GLUCOSE          I&O's Summary      GENERAL: Alert, well developed, in no acute distress.  MENTAL STATUS: AAOx3. Appropriate affect.  HEENT: PERRLA. EOMI. MMM.  Trachea midline. No lymph node swelling or tenderness.  RESPIRATORY: CTAB. No wheezing, rales or rhonchi.  CARDIOVASCULAR: RRR. No audible murmurs, rubs or gallops.   GASTROINTESTINAL: Drain with Serobilious output w/ approx 100cc in drain. Abdomen soft, NT, ND, -R/-G.  No pulsatile mass, no flank tenderness or suprapubic tenderness. No hepatosplenomegaly.  NEUROLOGIC: Cranial nerves II-XII grossly intact. No focal neurological deficits. Moves all extremities spontaneously. Sensation intact bilaterally.  INTGUMENTARY: No overt rashes or lesions, petechia or purpura. Good turgor. No edema.  MUSCULOSKELETAL: No cyanosis or clubbing. No gross deformities.   LYMPHATIC: Palpation of neck reveals no swelling or tenderness of neck nodes. Palpation of groin reveals no swelling or tenderness of groin nodes.    LABS: None              IMAGING: U/S on 7/20 shows no evidence of leak. ACUTE CARE SURGERY CONSULT      HPI: y/o F s/p open choelcystectomy w/ placement of drain on 7/16/18 presenting with consistently high drain output and generalized abdominal pain. Patient states that her pain has not worsened since discharge, but was concerned because her drain output has been >100mL/day, light green output. Denies F/C/N/V/CP/SOB. No change from usual bowel habits. Tolerating diet. OOB and ambulating at home      PAST MEDICAL HISTORY:  Osteoarthritis  Osteopenia  Breast cancer  Hyperlipidemia  Hypertension  Carotid stenosis  CAD (coronary artery disease)      PAST SURGICAL HISTORY:  H/O benign breast biopsy  S/P lumpectomy, right breast  S/P CABG x 3  Open cholecystectomy      ALLERGIES:  No Known Allergies    FAMILY HISTORY: Non-contributory    SOCIAL HISTORY: Denies toxic habits x 3    HOME MEDICATIONS: See Med rec     MEDICATIONS  (STANDING):    MEDICATIONS  (PRN):      VITALS & I/Os:  Vital Signs Last 24 Hrs  T(C): 37.1 (23 Jul 2018 13:04), Max: 37.1 (23 Jul 2018 13:04)  T(F): 98.8 (23 Jul 2018 13:04), Max: 98.8 (23 Jul 2018 13:04)  HR: 68 (23 Jul 2018 13:04) (68 - 68)  BP: 146/53 (23 Jul 2018 13:04) (146/53 - 146/53)  BP(mean): --  RR: 18 (23 Jul 2018 13:04) (18 - 18)  SpO2: 96% (23 Jul 2018 13:04) (96% - 96%)  CAPILLARY BLOOD GLUCOSE          I&O's Summary      GENERAL: Alert, well developed, in no acute distress.  MENTAL STATUS: AAOx3. Appropriate affect.  HEENT: PERRLA. EOMI. MMM.  Trachea midline. No lymph node swelling or tenderness.  RESPIRATORY: CTAB. No wheezing, rales or rhonchi.  CARDIOVASCULAR: RRR. No audible murmurs, rubs or gallops.   GASTROINTESTINAL: Drain with Serobilious output w/ approx 100cc in drain. Abdomen soft, NT, ND, -R/-G.  No pulsatile mass, no flank tenderness or suprapubic tenderness. No hepatosplenomegaly.  NEUROLOGIC: Cranial nerves II-XII grossly intact. No focal neurological deficits. Moves all extremities spontaneously. Sensation intact bilaterally.  INTGUMENTARY: No overt rashes or lesions, petechia or purpura. Good turgor. No edema.  MUSCULOSKELETAL: No cyanosis or clubbing. No gross deformities.   LYMPHATIC: Palpation of neck reveals no swelling or tenderness of neck nodes. Palpation of groin reveals no swelling or tenderness of groin nodes.    LABS: None              IMAGING: U/S on 7/20 shows no evidence of leak. ACUTE CARE SURGERY CONSULT      HPI: 83 y/o F s/p open cholecystectomy w/ placement of drain on 7/16/18 presenting with consistently high drain output and generalized abdominal pain. Patient states that her pain has not worsened since discharge, but was concerned because her drain output has been >100mL/day, light green output. Denies F/C/N/V/CP/SOB. No change from usual bowel habits. Tolerating diet. OOB and ambulating at home      PAST MEDICAL HISTORY:  Osteoarthritis  Osteopenia  Breast cancer  Hyperlipidemia  Hypertension  Carotid stenosis  CAD (coronary artery disease)      PAST SURGICAL HISTORY:  H/O benign breast biopsy  S/P lumpectomy, right breast  S/P CABG x 3  Open cholecystectomy      ALLERGIES:  No Known Allergies    FAMILY HISTORY: Non-contributory    SOCIAL HISTORY: Denies toxic habits x 3    HOME MEDICATIONS: See Med rec     MEDICATIONS  (STANDING):    MEDICATIONS  (PRN):      VITALS & I/Os:  Vital Signs Last 24 Hrs  T(C): 37.1 (23 Jul 2018 13:04), Max: 37.1 (23 Jul 2018 13:04)  T(F): 98.8 (23 Jul 2018 13:04), Max: 98.8 (23 Jul 2018 13:04)  HR: 68 (23 Jul 2018 13:04) (68 - 68)  BP: 146/53 (23 Jul 2018 13:04) (146/53 - 146/53)  BP(mean): --  RR: 18 (23 Jul 2018 13:04) (18 - 18)  SpO2: 96% (23 Jul 2018 13:04) (96% - 96%)  CAPILLARY BLOOD GLUCOSE          I&O's Summary      GENERAL: Alert, well developed, in no acute distress.  MENTAL STATUS: AAOx3. Appropriate affect.  HEENT: PERRLA. EOMI. MMM.  Trachea midline. No lymph node swelling or tenderness.  RESPIRATORY: CTAB. No wheezing, rales or rhonchi.  CARDIOVASCULAR: RRR. No audible murmurs, rubs or gallops.   GASTROINTESTINAL: Drain with Serobilious output w/ approx 100cc in drain. Abdomen soft, NT, ND, -R/-G.  No pulsatile mass, no flank tenderness or suprapubic tenderness. No hepatosplenomegaly.  NEUROLOGIC: Cranial nerves II-XII grossly intact. No focal neurological deficits. Moves all extremities spontaneously. Sensation intact bilaterally.  INTGUMENTARY: No overt rashes or lesions, petechia or purpura. Good turgor. No edema.  MUSCULOSKELETAL: No cyanosis or clubbing. No gross deformities.   LYMPHATIC: Palpation of neck reveals no swelling or tenderness of neck nodes. Palpation of groin reveals no swelling or tenderness of groin nodes.    LABS: None              IMAGING: U/S on 7/20 shows no evidence of leak.

## 2018-07-23 NOTE — CONSULT NOTE ADULT - ASSESSMENT
-No acute surgical intervention needed at this time.   -Ok for d/c home   -F/u with scheduled outpatient appointment on 7/31  -Continue to monitor drain output w/ home log  -PRN pain control with non-narcotic medications  -OOB, ambulate, IS use 83 y/o F s/p open cholecystectomy w/ placement of drain on 7/16/18 presenting with consistently high drain output and generalized abdominal pain.  -No acute surgical intervention needed at this time.   -Ok for d/c home   -F/u with scheduled outpatient appointment on 7/31  -Continue to monitor drain output w/ home log  -PRN pain control with non-narcotic medications  -OOB, ambulate, IS use

## 2018-07-23 NOTE — ED STATDOCS - OBJECTIVE STATEMENT
[age]year old female s/p cholecystectomy iweek ago presents with bdominal discomfort. pt has a drain in place and was told that if she had a certain amount of drainage  she should come to the ed. pt denies any nauseas, vomiting fever or chills

## 2018-07-31 ENCOUNTER — APPOINTMENT (OUTPATIENT)
Dept: TRAUMA SURGERY | Facility: CLINIC | Age: 83
End: 2018-07-31
Payer: MEDICARE

## 2018-07-31 VITALS
WEIGHT: 108 LBS | HEART RATE: 82 BPM | TEMPERATURE: 98 F | SYSTOLIC BLOOD PRESSURE: 134 MMHG | BODY MASS INDEX: 16 KG/M2 | HEIGHT: 68.75 IN | DIASTOLIC BLOOD PRESSURE: 76 MMHG | OXYGEN SATURATION: 99 % | RESPIRATION RATE: 16 BRPM

## 2018-07-31 DIAGNOSIS — Z87.19 OTHER SPECIFIED POSTPROCEDURAL STATES: ICD-10-CM

## 2018-07-31 DIAGNOSIS — Z90.49 ACQUIRED ABSENCE OF OTHER SPECIFIED PARTS OF DIGESTIVE TRACT: ICD-10-CM

## 2018-07-31 DIAGNOSIS — Z98.890 OTHER SPECIFIED POSTPROCEDURAL STATES: ICD-10-CM

## 2018-07-31 PROCEDURE — 99024 POSTOP FOLLOW-UP VISIT: CPT

## 2018-08-02 ENCOUNTER — APPOINTMENT (OUTPATIENT)
Dept: TRAUMA SURGERY | Facility: CLINIC | Age: 83
End: 2018-08-02

## 2018-08-02 VITALS
OXYGEN SATURATION: 96 % | WEIGHT: 108 LBS | SYSTOLIC BLOOD PRESSURE: 154 MMHG | BODY MASS INDEX: 16.06 KG/M2 | TEMPERATURE: 99.3 F | HEART RATE: 90 BPM | DIASTOLIC BLOOD PRESSURE: 71 MMHG

## 2018-08-06 PROBLEM — Z90.49 S/P CHOLECYSTECTOMY: Status: ACTIVE | Noted: 2018-08-02

## 2018-08-06 PROBLEM — Z98.890 S/P REPAIR OF VENTRAL HERNIA: Status: ACTIVE | Noted: 2018-08-02

## 2019-05-10 ENCOUNTER — TRANSCRIPTION ENCOUNTER (OUTPATIENT)
Age: 84
End: 2019-05-10

## 2019-05-11 ENCOUNTER — INPATIENT (INPATIENT)
Facility: HOSPITAL | Age: 84
LOS: 4 days | Discharge: HOME | DRG: 331 | End: 2019-05-16
Attending: STUDENT IN AN ORGANIZED HEALTH CARE EDUCATION/TRAINING PROGRAM | Admitting: STUDENT IN AN ORGANIZED HEALTH CARE EDUCATION/TRAINING PROGRAM
Payer: COMMERCIAL

## 2019-05-11 VITALS
OXYGEN SATURATION: 97 % | DIASTOLIC BLOOD PRESSURE: 65 MMHG | HEIGHT: 60 IN | RESPIRATION RATE: 16 BRPM | WEIGHT: 119.93 LBS | HEART RATE: 65 BPM | TEMPERATURE: 98 F | SYSTOLIC BLOOD PRESSURE: 111 MMHG

## 2019-05-11 LAB
ALBUMIN SERPL ELPH-MCNC: 4 G/DL — SIGNIFICANT CHANGE UP (ref 3.3–5.2)
ALP SERPL-CCNC: 172 U/L — HIGH (ref 40–120)
ALT FLD-CCNC: 26 U/L — SIGNIFICANT CHANGE UP
ANION GAP SERPL CALC-SCNC: 14 MMOL/L — SIGNIFICANT CHANGE UP (ref 5–17)
APTT BLD: 28.7 SEC — SIGNIFICANT CHANGE UP (ref 27.5–36.3)
AST SERPL-CCNC: 43 U/L — HIGH
BASOPHILS # BLD AUTO: 0 K/UL — SIGNIFICANT CHANGE UP (ref 0–0.2)
BASOPHILS NFR BLD AUTO: 0.2 % — SIGNIFICANT CHANGE UP (ref 0–2)
BILIRUB SERPL-MCNC: 0.7 MG/DL — SIGNIFICANT CHANGE UP (ref 0.4–2)
BUN SERPL-MCNC: 25 MG/DL — HIGH (ref 8–20)
CALCIUM SERPL-MCNC: 9.2 MG/DL — SIGNIFICANT CHANGE UP (ref 8.6–10.2)
CHLORIDE SERPL-SCNC: 102 MMOL/L — SIGNIFICANT CHANGE UP (ref 98–107)
CO2 SERPL-SCNC: 21 MMOL/L — LOW (ref 22–29)
CREAT SERPL-MCNC: 0.8 MG/DL — SIGNIFICANT CHANGE UP (ref 0.5–1.3)
EOSINOPHIL # BLD AUTO: 0.2 K/UL — SIGNIFICANT CHANGE UP (ref 0–0.5)
EOSINOPHIL NFR BLD AUTO: 1.6 % — SIGNIFICANT CHANGE UP (ref 0–6)
GLUCOSE SERPL-MCNC: 82 MG/DL — SIGNIFICANT CHANGE UP (ref 70–115)
HCT VFR BLD CALC: 33.7 % — LOW (ref 37–47)
HGB BLD-MCNC: 11.1 G/DL — LOW (ref 12–16)
INR BLD: 1.08 RATIO — SIGNIFICANT CHANGE UP (ref 0.88–1.16)
LACTATE BLDV-MCNC: 1.5 MMOL/L — SIGNIFICANT CHANGE UP (ref 0.5–2)
LIDOCAIN IGE QN: 31 U/L — SIGNIFICANT CHANGE UP (ref 22–51)
LYMPHOCYTES # BLD AUTO: 18 % — LOW (ref 20–55)
LYMPHOCYTES # BLD AUTO: 2.4 K/UL — SIGNIFICANT CHANGE UP (ref 1–4.8)
MCHC RBC-ENTMCNC: 28.5 PG — SIGNIFICANT CHANGE UP (ref 27–31)
MCHC RBC-ENTMCNC: 32.9 G/DL — SIGNIFICANT CHANGE UP (ref 32–36)
MCV RBC AUTO: 86.6 FL — SIGNIFICANT CHANGE UP (ref 81–99)
MONOCYTES # BLD AUTO: 0.9 K/UL — HIGH (ref 0–0.8)
MONOCYTES NFR BLD AUTO: 6.9 % — SIGNIFICANT CHANGE UP (ref 3–10)
NEUTROPHILS # BLD AUTO: 9.6 K/UL — HIGH (ref 1.8–8)
NEUTROPHILS NFR BLD AUTO: 73.1 % — HIGH (ref 37–73)
PLATELET # BLD AUTO: 304 K/UL — SIGNIFICANT CHANGE UP (ref 150–400)
POTASSIUM SERPL-MCNC: 5 MMOL/L — SIGNIFICANT CHANGE UP (ref 3.5–5.3)
POTASSIUM SERPL-SCNC: 5 MMOL/L — SIGNIFICANT CHANGE UP (ref 3.5–5.3)
PROT SERPL-MCNC: 7.3 G/DL — SIGNIFICANT CHANGE UP (ref 6.6–8.7)
PROTHROM AB SERPL-ACNC: 12.4 SEC — SIGNIFICANT CHANGE UP (ref 10–12.9)
RBC # BLD: 3.89 M/UL — LOW (ref 4.4–5.2)
RBC # FLD: 14.7 % — SIGNIFICANT CHANGE UP (ref 11–15.6)
SODIUM SERPL-SCNC: 137 MMOL/L — SIGNIFICANT CHANGE UP (ref 135–145)
WBC # BLD: 13.2 K/UL — HIGH (ref 4.8–10.8)
WBC # FLD AUTO: 13.2 K/UL — HIGH (ref 4.8–10.8)

## 2019-05-11 PROCEDURE — 99285 EMERGENCY DEPT VISIT HI MDM: CPT

## 2019-05-11 PROCEDURE — 44120 REMOVAL OF SMALL INTESTINE: CPT

## 2019-05-11 PROCEDURE — 71045 X-RAY EXAM CHEST 1 VIEW: CPT | Mod: 26

## 2019-05-11 PROCEDURE — 74177 CT ABD & PELVIS W/CONTRAST: CPT | Mod: 26

## 2019-05-11 PROCEDURE — 93010 ELECTROCARDIOGRAM REPORT: CPT

## 2019-05-11 RX ORDER — SODIUM CHLORIDE 9 MG/ML
1000 INJECTION INTRAMUSCULAR; INTRAVENOUS; SUBCUTANEOUS ONCE
Refills: 0 | Status: COMPLETED | OUTPATIENT
Start: 2019-05-11 | End: 2019-05-11

## 2019-05-11 RX ORDER — SODIUM CHLORIDE 9 MG/ML
1000 INJECTION INTRAMUSCULAR; INTRAVENOUS; SUBCUTANEOUS
Refills: 0 | Status: DISCONTINUED | OUTPATIENT
Start: 2019-05-11 | End: 2019-05-12

## 2019-05-11 RX ADMIN — SODIUM CHLORIDE 1000 MILLILITER(S): 9 INJECTION INTRAMUSCULAR; INTRAVENOUS; SUBCUTANEOUS at 23:18

## 2019-05-11 NOTE — ED ADULT TRIAGE NOTE - CHIEF COMPLAINT QUOTE
pt comes to ed from home reports of lower abdominal pain with nausea/vomiting and difficulty having a bowel movement since yesterday.

## 2019-05-11 NOTE — ED ADULT NURSE NOTE - OBJECTIVE STATEMENT
Pt c/o abdominal pain x 3 days, denies fevers/chills, hx of open heart surgery and stents years ago, AOx4, denies SOB or diff breathing, denies n/v, states she is constipated x days but has been able to produce "pebble sized" BM, breast cancer hx lymphnodes removed on right side in 2001, daughter at bedside

## 2019-05-11 NOTE — ED PROVIDER NOTE - PROGRESS NOTE DETAILS
Pt signed out to me by Dr. Lo pending CT and reassessment.  Pt with closed loop SBO.  Dr. Whitley to admit patient.

## 2019-05-11 NOTE — ED ADULT NURSE NOTE - NSIMPLEMENTINTERV_GEN_ALL_ED
Implemented All Universal Safety Interventions:  [place] to call system. Call bell, personal items and telephone within reach. Instruct patient to call for assistance. Room bathroom lighting operational. Non-slip footwear when patient is off stretcher. Physically safe environment: no spills, clutter or unnecessary equipment. Stretcher in lowest position, wheels locked, appropriate side rails in place.

## 2019-05-11 NOTE — ED ADULT NURSE REASSESSMENT NOTE - NS ED NURSE REASSESS COMMENT FT1
Report received from rhys estrada.  Pt resting comfortably on stretcher.  No complaints of pain.  Respirations even and unlabored.  Awaiting CT abdomen/pelvis. POC explained to pt. and family at bedside.  PIV wnl; flushing without difficulty.  In NAD, will continue to monitor.

## 2019-05-11 NOTE — ED ADULT NURSE REASSESSMENT NOTE - NS ED NURSE REASSESS COMMENT FT1
pt. returning from abdominal CT complaining of midsternal chest pain 5/10.  MD Tenorio made aware and pt. to have EKG ordered and performed.  Pt. denies sob at this time and denies radiation of pain

## 2019-05-11 NOTE — ED PROVIDER NOTE - OBJECTIVE STATEMENT
83 year old female with PMH CAD s/p CABG, HTN, HLD present with abd pain. Pain started on Thursday with b/l lower abd pain, cramping, constant, no radiation, worse with a BM. Timmy 83 year old female with PMH CAD s/p CABG, HTN, HLD present with abd pain. Pain started on Thursday with b/l lower abd pain, cramping, constant, no radiation, worse with a BM. Denies diarrhea, melena, dysurua, hematuria, fever, chills, chest pain, SOB

## 2019-05-12 ENCOUNTER — RESULT REVIEW (OUTPATIENT)
Age: 84
End: 2019-05-12

## 2019-05-12 DIAGNOSIS — Z90.49 ACQUIRED ABSENCE OF OTHER SPECIFIED PARTS OF DIGESTIVE TRACT: Chronic | ICD-10-CM

## 2019-05-12 DIAGNOSIS — K56.609 UNSPECIFIED INTESTINAL OBSTRUCTION, UNSPECIFIED AS TO PARTIAL VERSUS COMPLETE OBSTRUCTION: ICD-10-CM

## 2019-05-12 LAB
ANION GAP SERPL CALC-SCNC: 12 MMOL/L — SIGNIFICANT CHANGE UP (ref 5–17)
ANION GAP SERPL CALC-SCNC: 15 MMOL/L — SIGNIFICANT CHANGE UP (ref 5–17)
APPEARANCE UR: CLEAR — SIGNIFICANT CHANGE UP
BACTERIA # UR AUTO: ABNORMAL
BASE EXCESS BLDA CALC-SCNC: -2.7 MMOL/L — LOW (ref -2–2)
BASOPHILS # BLD AUTO: 0 K/UL — SIGNIFICANT CHANGE UP (ref 0–0.2)
BASOPHILS NFR BLD AUTO: 0.2 % — SIGNIFICANT CHANGE UP (ref 0–2)
BILIRUB UR-MCNC: NEGATIVE — SIGNIFICANT CHANGE UP
BLOOD GAS COMMENTS ARTERIAL: SIGNIFICANT CHANGE UP
BUN SERPL-MCNC: 18 MG/DL — SIGNIFICANT CHANGE UP (ref 8–20)
BUN SERPL-MCNC: 20 MG/DL — SIGNIFICANT CHANGE UP (ref 8–20)
CALCIUM SERPL-MCNC: 7.7 MG/DL — LOW (ref 8.6–10.2)
CALCIUM SERPL-MCNC: 8 MG/DL — LOW (ref 8.6–10.2)
CHLORIDE SERPL-SCNC: 102 MMOL/L — SIGNIFICANT CHANGE UP (ref 98–107)
CHLORIDE SERPL-SCNC: 102 MMOL/L — SIGNIFICANT CHANGE UP (ref 98–107)
CO2 SERPL-SCNC: 18 MMOL/L — LOW (ref 22–29)
CO2 SERPL-SCNC: 20 MMOL/L — LOW (ref 22–29)
COLOR SPEC: YELLOW — SIGNIFICANT CHANGE UP
CREAT SERPL-MCNC: 0.85 MG/DL — SIGNIFICANT CHANGE UP (ref 0.5–1.3)
CREAT SERPL-MCNC: 0.89 MG/DL — SIGNIFICANT CHANGE UP (ref 0.5–1.3)
DIFF PNL FLD: NEGATIVE — SIGNIFICANT CHANGE UP
EOSINOPHIL # BLD AUTO: 0.3 K/UL — SIGNIFICANT CHANGE UP (ref 0–0.5)
EOSINOPHIL NFR BLD AUTO: 1.6 % — SIGNIFICANT CHANGE UP (ref 0–6)
EPI CELLS # UR: SIGNIFICANT CHANGE UP
GAS PNL BLDA: SIGNIFICANT CHANGE UP
GLUCOSE SERPL-MCNC: 125 MG/DL — HIGH (ref 70–115)
GLUCOSE SERPL-MCNC: 174 MG/DL — HIGH (ref 70–115)
GLUCOSE UR QL: NEGATIVE MG/DL — SIGNIFICANT CHANGE UP
HCO3 BLDA-SCNC: 22 MMOL/L — SIGNIFICANT CHANGE UP (ref 20–26)
HCT VFR BLD CALC: 31.8 % — LOW (ref 37–47)
HGB BLD-MCNC: 10.3 G/DL — LOW (ref 12–16)
HOROWITZ INDEX BLDA+IHG-RTO: 21 — SIGNIFICANT CHANGE UP
KETONES UR-MCNC: ABNORMAL
LEUKOCYTE ESTERASE UR-ACNC: NEGATIVE — SIGNIFICANT CHANGE UP
LYMPHOCYTES # BLD AUTO: 13.8 % — LOW (ref 20–55)
LYMPHOCYTES # BLD AUTO: 2.2 K/UL — SIGNIFICANT CHANGE UP (ref 1–4.8)
MAGNESIUM SERPL-MCNC: 1.7 MG/DL — SIGNIFICANT CHANGE UP (ref 1.6–2.6)
MCHC RBC-ENTMCNC: 28.1 PG — SIGNIFICANT CHANGE UP (ref 27–31)
MCHC RBC-ENTMCNC: 32.4 G/DL — SIGNIFICANT CHANGE UP (ref 32–36)
MCV RBC AUTO: 86.9 FL — SIGNIFICANT CHANGE UP (ref 81–99)
MONOCYTES # BLD AUTO: 0.7 K/UL — SIGNIFICANT CHANGE UP (ref 0–0.8)
MONOCYTES NFR BLD AUTO: 4.2 % — SIGNIFICANT CHANGE UP (ref 3–10)
NEUTROPHILS # BLD AUTO: 12.8 K/UL — HIGH (ref 1.8–8)
NEUTROPHILS NFR BLD AUTO: 80 % — HIGH (ref 37–73)
NITRITE UR-MCNC: NEGATIVE — SIGNIFICANT CHANGE UP
PCO2 BLDA: 35 MMHG — SIGNIFICANT CHANGE UP (ref 35–45)
PH BLDA: 7.4 — SIGNIFICANT CHANGE UP (ref 7.35–7.45)
PH UR: 6.5 — SIGNIFICANT CHANGE UP (ref 5–8)
PHOSPHATE SERPL-MCNC: 2.1 MG/DL — LOW (ref 2.4–4.7)
PLATELET # BLD AUTO: 257 K/UL — SIGNIFICANT CHANGE UP (ref 150–400)
PO2 BLDA: 63 MMHG — LOW (ref 83–108)
POTASSIUM SERPL-MCNC: 3.8 MMOL/L — SIGNIFICANT CHANGE UP (ref 3.5–5.3)
POTASSIUM SERPL-MCNC: 3.8 MMOL/L — SIGNIFICANT CHANGE UP (ref 3.5–5.3)
POTASSIUM SERPL-SCNC: 3.8 MMOL/L — SIGNIFICANT CHANGE UP (ref 3.5–5.3)
POTASSIUM SERPL-SCNC: 3.8 MMOL/L — SIGNIFICANT CHANGE UP (ref 3.5–5.3)
PROT UR-MCNC: 30 MG/DL
RBC # BLD: 3.66 M/UL — LOW (ref 4.4–5.2)
RBC # FLD: 14.7 % — SIGNIFICANT CHANGE UP (ref 11–15.6)
RBC CASTS # UR COMP ASSIST: SIGNIFICANT CHANGE UP /HPF (ref 0–4)
SAO2 % BLDA: 93 % — LOW (ref 95–99)
SODIUM SERPL-SCNC: 132 MMOL/L — LOW (ref 135–145)
SODIUM SERPL-SCNC: 137 MMOL/L — SIGNIFICANT CHANGE UP (ref 135–145)
SP GR SPEC: 1 — LOW (ref 1.01–1.02)
TROPONIN T SERPL-MCNC: <0.01 NG/ML — SIGNIFICANT CHANGE UP (ref 0–0.06)
TYPE + AB SCN PNL BLD: SIGNIFICANT CHANGE UP
UROBILINOGEN FLD QL: NEGATIVE MG/DL — SIGNIFICANT CHANGE UP
WBC # BLD: 16 K/UL — HIGH (ref 4.8–10.8)
WBC # FLD AUTO: 16 K/UL — HIGH (ref 4.8–10.8)
WBC UR QL: SIGNIFICANT CHANGE UP

## 2019-05-12 PROCEDURE — 88304 TISSUE EXAM BY PATHOLOGIST: CPT | Mod: 26

## 2019-05-12 PROCEDURE — 99024 POSTOP FOLLOW-UP VISIT: CPT

## 2019-05-12 PROCEDURE — 71045 X-RAY EXAM CHEST 1 VIEW: CPT | Mod: 26

## 2019-05-12 PROCEDURE — 93010 ELECTROCARDIOGRAM REPORT: CPT | Mod: 77

## 2019-05-12 PROCEDURE — 71045 X-RAY EXAM CHEST 1 VIEW: CPT | Mod: 26,77

## 2019-05-12 PROCEDURE — 88307 TISSUE EXAM BY PATHOLOGIST: CPT | Mod: 26

## 2019-05-12 PROCEDURE — 93010 ELECTROCARDIOGRAM REPORT: CPT

## 2019-05-12 RX ORDER — ACETAMINOPHEN 500 MG
650 TABLET ORAL ONCE
Refills: 0 | Status: COMPLETED | OUTPATIENT
Start: 2019-05-12 | End: 2019-05-12

## 2019-05-12 RX ORDER — PANTOPRAZOLE SODIUM 20 MG/1
40 TABLET, DELAYED RELEASE ORAL DAILY
Refills: 0 | Status: DISCONTINUED | OUTPATIENT
Start: 2019-05-12 | End: 2019-05-16

## 2019-05-12 RX ORDER — METOPROLOL TARTRATE 50 MG
25 TABLET ORAL DAILY
Refills: 0 | Status: DISCONTINUED | OUTPATIENT
Start: 2019-05-12 | End: 2019-05-16

## 2019-05-12 RX ORDER — SODIUM CHLORIDE 9 MG/ML
1000 INJECTION, SOLUTION INTRAVENOUS
Refills: 0 | Status: DISCONTINUED | OUTPATIENT
Start: 2019-05-12 | End: 2019-05-16

## 2019-05-12 RX ORDER — ENOXAPARIN SODIUM 100 MG/ML
40 INJECTION SUBCUTANEOUS DAILY
Refills: 0 | Status: DISCONTINUED | OUTPATIENT
Start: 2019-05-12 | End: 2019-05-16

## 2019-05-12 RX ORDER — SODIUM CHLORIDE 9 MG/ML
1000 INJECTION, SOLUTION INTRAVENOUS
Refills: 0 | Status: DISCONTINUED | OUTPATIENT
Start: 2019-05-12 | End: 2019-05-12

## 2019-05-12 RX ORDER — MAGNESIUM SULFATE 500 MG/ML
2 VIAL (ML) INJECTION ONCE
Refills: 0 | Status: COMPLETED | OUTPATIENT
Start: 2019-05-12 | End: 2019-05-12

## 2019-05-12 RX ORDER — ONDANSETRON 8 MG/1
4 TABLET, FILM COATED ORAL ONCE
Refills: 0 | Status: COMPLETED | OUTPATIENT
Start: 2019-05-12 | End: 2019-05-12

## 2019-05-12 RX ORDER — DILTIAZEM HCL 120 MG
120 CAPSULE, EXT RELEASE 24 HR ORAL DAILY
Refills: 0 | Status: DISCONTINUED | OUTPATIENT
Start: 2019-05-12 | End: 2019-05-16

## 2019-05-12 RX ORDER — FENTANYL CITRATE 50 UG/ML
50 INJECTION INTRAVENOUS
Refills: 0 | Status: DISCONTINUED | OUTPATIENT
Start: 2019-05-12 | End: 2019-05-12

## 2019-05-12 RX ORDER — DILTIAZEM HCL 120 MG
120 CAPSULE, EXT RELEASE 24 HR ORAL DAILY
Refills: 0 | Status: DISCONTINUED | OUTPATIENT
Start: 2019-05-12 | End: 2019-05-12

## 2019-05-12 RX ORDER — SODIUM CHLORIDE 9 MG/ML
1000 INJECTION INTRAMUSCULAR; INTRAVENOUS; SUBCUTANEOUS
Refills: 0 | Status: DISCONTINUED | OUTPATIENT
Start: 2019-05-12 | End: 2019-05-12

## 2019-05-12 RX ORDER — FENTANYL CITRATE 50 UG/ML
25 INJECTION INTRAVENOUS
Refills: 0 | Status: DISCONTINUED | OUTPATIENT
Start: 2019-05-12 | End: 2019-05-12

## 2019-05-12 RX ORDER — LISINOPRIL 2.5 MG/1
2.5 TABLET ORAL DAILY
Refills: 0 | Status: DISCONTINUED | OUTPATIENT
Start: 2019-05-12 | End: 2019-05-16

## 2019-05-12 RX ORDER — POTASSIUM PHOSPHATE, MONOBASIC POTASSIUM PHOSPHATE, DIBASIC 236; 224 MG/ML; MG/ML
30 INJECTION, SOLUTION INTRAVENOUS ONCE
Refills: 0 | Status: DISCONTINUED | OUTPATIENT
Start: 2019-05-12 | End: 2019-05-12

## 2019-05-12 RX ORDER — ONDANSETRON 8 MG/1
4 TABLET, FILM COATED ORAL EVERY 6 HOURS
Refills: 0 | Status: DISCONTINUED | OUTPATIENT
Start: 2019-05-12 | End: 2019-05-16

## 2019-05-12 RX ORDER — ACETAMINOPHEN 500 MG
650 TABLET ORAL EVERY 6 HOURS
Refills: 0 | Status: DISCONTINUED | OUTPATIENT
Start: 2019-05-13 | End: 2019-05-16

## 2019-05-12 RX ORDER — ACETAMINOPHEN 500 MG
650 TABLET ORAL EVERY 6 HOURS
Refills: 0 | Status: DISCONTINUED | OUTPATIENT
Start: 2019-05-12 | End: 2019-05-12

## 2019-05-12 RX ORDER — IBUPROFEN 200 MG
400 TABLET ORAL EVERY 6 HOURS
Refills: 0 | Status: DISCONTINUED | OUTPATIENT
Start: 2019-05-12 | End: 2019-05-12

## 2019-05-12 RX ORDER — ACETAMINOPHEN 500 MG
TABLET ORAL
Refills: 0 | Status: DISCONTINUED | OUTPATIENT
Start: 2019-05-12 | End: 2019-05-16

## 2019-05-12 RX ORDER — METOPROLOL TARTRATE 50 MG
5 TABLET ORAL EVERY 6 HOURS
Refills: 0 | Status: DISCONTINUED | OUTPATIENT
Start: 2019-05-12 | End: 2019-05-16

## 2019-05-12 RX ADMIN — Medication 650 MILLIGRAM(S): at 20:34

## 2019-05-12 RX ADMIN — FENTANYL CITRATE 25 MICROGRAM(S): 50 INJECTION INTRAVENOUS at 03:45

## 2019-05-12 RX ADMIN — FENTANYL CITRATE 25 MICROGRAM(S): 50 INJECTION INTRAVENOUS at 03:54

## 2019-05-12 RX ADMIN — Medication 650 MILLIGRAM(S): at 06:29

## 2019-05-12 RX ADMIN — Medication 5 MILLIGRAM(S): at 18:40

## 2019-05-12 RX ADMIN — PANTOPRAZOLE SODIUM 40 MILLIGRAM(S): 20 TABLET, DELAYED RELEASE ORAL at 13:28

## 2019-05-12 RX ADMIN — Medication 5 MILLIGRAM(S): at 23:46

## 2019-05-12 RX ADMIN — ONDANSETRON 4 MILLIGRAM(S): 8 TABLET, FILM COATED ORAL at 03:38

## 2019-05-12 RX ADMIN — Medication 650 MILLIGRAM(S): at 07:30

## 2019-05-12 RX ADMIN — FENTANYL CITRATE 25 MICROGRAM(S): 50 INJECTION INTRAVENOUS at 03:31

## 2019-05-12 RX ADMIN — Medication 85 MILLIMOLE(S): at 13:28

## 2019-05-12 RX ADMIN — ENOXAPARIN SODIUM 40 MILLIGRAM(S): 100 INJECTION SUBCUTANEOUS at 13:28

## 2019-05-12 RX ADMIN — Medication 50 GRAM(S): at 13:28

## 2019-05-12 RX ADMIN — Medication 650 MILLIGRAM(S): at 21:34

## 2019-05-12 NOTE — BRIEF OPERATIVE NOTE - NSICDXBRIEFPOSTOP_GEN_ALL_CORE_FT
POST-OP DIAGNOSIS:  Small bowel mass 12-May-2019 03:36:05  Olga Ugalde  Small bowel obstruction 12-May-2019 03:35:55  Olga Ugalde

## 2019-05-12 NOTE — H&P ADULT - ASSESSMENT
83 year old female presenting with one day history of obstipation and only small bowel movements associated with abdominal pain and nausea. imaging concerning for closed loop small bowel obstruction likely due to adhesive disease with prior surgical history.

## 2019-05-12 NOTE — H&P ADULT - NSICDXPASTMEDICALHX_GEN_ALL_CORE_FT
PAST MEDICAL HISTORY:  Breast cancer     CAD (coronary artery disease)     Carotid stenosis     Hyperlipidemia     Hypertension     Osteoarthritis     Osteopenia

## 2019-05-12 NOTE — BRIEF OPERATIVE NOTE - NSICDXBRIEFPROCEDURE_GEN_ALL_CORE_FT
PROCEDURES:  Open lysis of abdominal adhesions 12-May-2019 03:35:38  Olga Ugalde  Small bowel resection with anastomosis 12-May-2019 03:35:21  Olga Ugalde  Exploratory laparotomy with bowel resection 12-May-2019 03:35:15  Olga Ugalde

## 2019-05-12 NOTE — H&P ADULT - HISTORY OF PRESENT ILLNESS
83 year old female presents to ED with daughter complaining of worsening abdominal pain for the past day. Pain is located in middle of abdomen, nonradiating and sharp in nature. Ate small amount of food this morning but has been extremely nauseous without episodes of emesis. Last passed flatus last night and had a few small bowel movements this morning. Never experienced abdominal pain like this before

## 2019-05-12 NOTE — H&P ADULT - PROBLEM SELECTOR PLAN 1
-admit to [facility]  -NPO/IVF  -NGT insertion  -OR for ex lap, possible bowel resection, possible ostomy  -dvt ppx

## 2019-05-12 NOTE — PRE-OP CHECKLIST - ALLERGIES REVIEWED
done Pre-application: Motor, sensory, and vascular responses intact in the injured extremity./Post-application: Motor, sensory, and vascular responses intact in the injured extremity.

## 2019-05-12 NOTE — H&P ADULT - NSICDXPASTSURGICALHX_GEN_ALL_CORE_FT
PAST SURGICAL HISTORY:  H/O benign breast biopsy right, 10/1/14    S/P CABG x 3 2011    S/P laparoscopic cholecystectomy     S/P lumpectomy, right breast

## 2019-05-12 NOTE — H&P ADULT - NSHPREVIEWOFSYSTEMS_GEN_ALL_CORE
denies fevers, chills, chest pain, shortness of breath, vomiting, palpitations, dysuria, diarrhea, blood per rectum

## 2019-05-12 NOTE — CHART NOTE - NSCHARTNOTEFT_GEN_A_CORE
Patient seen at bedside. VSS, afebrile. NPO with sips/chips/hard candy. Dinero removed. Passed TOV. Pain well controlled. Cardiology consulted. Denies fever, chills, vomiting, diarrhea, constipation. Reports some nausea. No concerns at this time     Physical exam: Patient seen at bedside. VSS, afebrile. NPO with sips/chips/hard candy. Dinero removed. Passed TOV. Pain well controlled. Cardiology consulted. Denies fever, chills, vomiting, diarrhea, constipation. Reports some nausea. No concerns at this time     Physical exam:  Well appearing, no acute distress  CTAB, no increased work of breathing  RRR, normal S1S2  Abdomen soft, nondistended, appropriately tender to palpation. dressings c/d/i  Voiding spontaneously, clear yellow urine  Non-edematous extremities     83 year old female with history of breast cancer s/p lumpectomy and cholecystectomy now s/p exploratory laparotomy and small bowel resection for closed loop obstruction. Found to have a mass in distal ileum. Recovering well post operatively    - cards consult called- Dr. Rodriguez aware of admission   - passed TOV  - NPO with sips/chips/hard candy  - pain control  - f/u AM labs  - f/u pathology  - awaiting bowel function  - DVT ppx  - OOB ambulation

## 2019-05-12 NOTE — BRIEF OPERATIVE NOTE - OPERATION/FINDINGS
small bowel mass identified in distal ileum, resected with approximately 8 cm margin on either side, primary side to side stapled anastomosis  small bowel run in its entirety and no other pathology identified

## 2019-05-12 NOTE — H&P ADULT - NSICDXFAMILYHX_GEN_ALL_CORE_FT
FAMILY HISTORY:  Sibling  Still living? No  Family history of heart disease, Age at diagnosis: Age Unknown

## 2019-05-13 ENCOUNTER — TRANSCRIPTION ENCOUNTER (OUTPATIENT)
Age: 84
End: 2019-05-13

## 2019-05-13 DIAGNOSIS — I44.7 LEFT BUNDLE-BRANCH BLOCK, UNSPECIFIED: ICD-10-CM

## 2019-05-13 LAB
ANION GAP SERPL CALC-SCNC: 13 MMOL/L — SIGNIFICANT CHANGE UP (ref 5–17)
BASOPHILS # BLD AUTO: 0 K/UL — SIGNIFICANT CHANGE UP (ref 0–0.2)
BASOPHILS # BLD AUTO: 0 K/UL — SIGNIFICANT CHANGE UP (ref 0–0.2)
BASOPHILS NFR BLD AUTO: 0.1 % — SIGNIFICANT CHANGE UP (ref 0–2)
BASOPHILS NFR BLD AUTO: 0.1 % — SIGNIFICANT CHANGE UP (ref 0–2)
BLD GP AB SCN SERPL QL: SIGNIFICANT CHANGE UP
BUN SERPL-MCNC: 18 MG/DL — SIGNIFICANT CHANGE UP (ref 8–20)
CALCIUM SERPL-MCNC: 8.4 MG/DL — LOW (ref 8.6–10.2)
CHLORIDE SERPL-SCNC: 102 MMOL/L — SIGNIFICANT CHANGE UP (ref 98–107)
CK SERPL-CCNC: 90 U/L — SIGNIFICANT CHANGE UP (ref 25–170)
CO2 SERPL-SCNC: 24 MMOL/L — SIGNIFICANT CHANGE UP (ref 22–29)
CREAT SERPL-MCNC: 0.81 MG/DL — SIGNIFICANT CHANGE UP (ref 0.5–1.3)
EOSINOPHIL # BLD AUTO: 0 K/UL — SIGNIFICANT CHANGE UP (ref 0–0.5)
EOSINOPHIL # BLD AUTO: 0 K/UL — SIGNIFICANT CHANGE UP (ref 0–0.5)
EOSINOPHIL NFR BLD AUTO: 0 % — SIGNIFICANT CHANGE UP (ref 0–6)
EOSINOPHIL NFR BLD AUTO: 0.1 % — SIGNIFICANT CHANGE UP (ref 0–6)
GLUCOSE SERPL-MCNC: 111 MG/DL — SIGNIFICANT CHANGE UP (ref 70–115)
HCT VFR BLD CALC: 25.9 % — LOW (ref 37–47)
HCT VFR BLD CALC: 26.6 % — LOW (ref 37–47)
HGB BLD-MCNC: 8.6 G/DL — LOW (ref 12–16)
HGB BLD-MCNC: 8.7 G/DL — LOW (ref 12–16)
LYMPHOCYTES # BLD AUTO: 1.4 K/UL — SIGNIFICANT CHANGE UP (ref 1–4.8)
LYMPHOCYTES # BLD AUTO: 1.6 K/UL — SIGNIFICANT CHANGE UP (ref 1–4.8)
LYMPHOCYTES # BLD AUTO: 12.5 % — LOW (ref 20–55)
LYMPHOCYTES # BLD AUTO: 9.5 % — LOW (ref 20–55)
MAGNESIUM SERPL-MCNC: 2.4 MG/DL — SIGNIFICANT CHANGE UP (ref 1.6–2.6)
MCHC RBC-ENTMCNC: 28.1 PG — SIGNIFICANT CHANGE UP (ref 27–31)
MCHC RBC-ENTMCNC: 28.4 PG — SIGNIFICANT CHANGE UP (ref 27–31)
MCHC RBC-ENTMCNC: 32.7 G/DL — SIGNIFICANT CHANGE UP (ref 32–36)
MCHC RBC-ENTMCNC: 33.2 G/DL — SIGNIFICANT CHANGE UP (ref 32–36)
MCV RBC AUTO: 85.5 FL — SIGNIFICANT CHANGE UP (ref 81–99)
MCV RBC AUTO: 85.8 FL — SIGNIFICANT CHANGE UP (ref 81–99)
MONOCYTES # BLD AUTO: 1 K/UL — HIGH (ref 0–0.8)
MONOCYTES # BLD AUTO: 1 K/UL — HIGH (ref 0–0.8)
MONOCYTES NFR BLD AUTO: 6.4 % — SIGNIFICANT CHANGE UP (ref 3–10)
MONOCYTES NFR BLD AUTO: 7.7 % — SIGNIFICANT CHANGE UP (ref 3–10)
NEUTROPHILS # BLD AUTO: 10.3 K/UL — HIGH (ref 1.8–8)
NEUTROPHILS # BLD AUTO: 12.6 K/UL — HIGH (ref 1.8–8)
NEUTROPHILS NFR BLD AUTO: 79.4 % — HIGH (ref 37–73)
NEUTROPHILS NFR BLD AUTO: 83.7 % — HIGH (ref 37–73)
PHOSPHATE SERPL-MCNC: 3.2 MG/DL — SIGNIFICANT CHANGE UP (ref 2.4–4.7)
PLATELET # BLD AUTO: 257 K/UL — SIGNIFICANT CHANGE UP (ref 150–400)
PLATELET # BLD AUTO: 265 K/UL — SIGNIFICANT CHANGE UP (ref 150–400)
POTASSIUM SERPL-MCNC: 4.4 MMOL/L — SIGNIFICANT CHANGE UP (ref 3.5–5.3)
POTASSIUM SERPL-SCNC: 4.4 MMOL/L — SIGNIFICANT CHANGE UP (ref 3.5–5.3)
RBC # BLD: 3.03 M/UL — LOW (ref 4.4–5.2)
RBC # BLD: 3.1 M/UL — LOW (ref 4.4–5.2)
RBC # FLD: 14.7 % — SIGNIFICANT CHANGE UP (ref 11–15.6)
RBC # FLD: 14.7 % — SIGNIFICANT CHANGE UP (ref 11–15.6)
SODIUM SERPL-SCNC: 139 MMOL/L — SIGNIFICANT CHANGE UP (ref 135–145)
TROPONIN T SERPL-MCNC: <0.01 NG/ML — SIGNIFICANT CHANGE UP (ref 0–0.06)
TYPE + AB SCN PNL BLD: SIGNIFICANT CHANGE UP
WBC # BLD: 13 K/UL — HIGH (ref 4.8–10.8)
WBC # BLD: 15.1 K/UL — HIGH (ref 4.8–10.8)
WBC # FLD AUTO: 13 K/UL — HIGH (ref 4.8–10.8)
WBC # FLD AUTO: 15.1 K/UL — HIGH (ref 4.8–10.8)

## 2019-05-13 PROCEDURE — 75716 ARTERY X-RAYS ARMS/LEGS: CPT | Mod: 26,XU

## 2019-05-13 PROCEDURE — 99223 1ST HOSP IP/OBS HIGH 75: CPT

## 2019-05-13 PROCEDURE — 93306 TTE W/DOPPLER COMPLETE: CPT | Mod: 26

## 2019-05-13 PROCEDURE — 93459 L HRT ART/GRFT ANGIO: CPT | Mod: 26

## 2019-05-13 PROCEDURE — 93970 EXTREMITY STUDY: CPT | Mod: 26

## 2019-05-13 PROCEDURE — 99152 MOD SED SAME PHYS/QHP 5/>YRS: CPT

## 2019-05-13 RX ORDER — ASPIRIN/CALCIUM CARB/MAGNESIUM 324 MG
81 TABLET ORAL ONCE
Refills: 0 | Status: COMPLETED | OUTPATIENT
Start: 2019-05-13 | End: 2019-05-13

## 2019-05-13 RX ORDER — SODIUM CHLORIDE 9 MG/ML
1000 INJECTION INTRAMUSCULAR; INTRAVENOUS; SUBCUTANEOUS
Refills: 0 | Status: DISCONTINUED | OUTPATIENT
Start: 2019-05-13 | End: 2019-05-13

## 2019-05-13 RX ORDER — TIOTROPIUM BROMIDE 18 UG/1
1 CAPSULE ORAL; RESPIRATORY (INHALATION) DAILY
Refills: 0 | Status: DISCONTINUED | OUTPATIENT
Start: 2019-05-13 | End: 2019-05-16

## 2019-05-13 RX ORDER — ALBUTEROL 90 UG/1
1 AEROSOL, METERED ORAL EVERY 4 HOURS
Refills: 0 | Status: DISCONTINUED | OUTPATIENT
Start: 2019-05-13 | End: 2019-05-16

## 2019-05-13 RX ORDER — ATORVASTATIN CALCIUM 80 MG/1
20 TABLET, FILM COATED ORAL AT BEDTIME
Refills: 0 | Status: DISCONTINUED | OUTPATIENT
Start: 2019-05-13 | End: 2019-05-16

## 2019-05-13 RX ORDER — IPRATROPIUM/ALBUTEROL SULFATE 18-103MCG
3 AEROSOL WITH ADAPTER (GRAM) INHALATION EVERY 6 HOURS
Refills: 0 | Status: DISCONTINUED | OUTPATIENT
Start: 2019-05-13 | End: 2019-05-16

## 2019-05-13 RX ADMIN — Medication 120 MILLIGRAM(S): at 06:52

## 2019-05-13 RX ADMIN — Medication 81 MILLIGRAM(S): at 11:38

## 2019-05-13 RX ADMIN — ATORVASTATIN CALCIUM 20 MILLIGRAM(S): 80 TABLET, FILM COATED ORAL at 21:50

## 2019-05-13 RX ADMIN — Medication 650 MILLIGRAM(S): at 07:47

## 2019-05-13 RX ADMIN — SODIUM CHLORIDE 90 MILLILITER(S): 9 INJECTION, SOLUTION INTRAVENOUS at 16:08

## 2019-05-13 RX ADMIN — Medication 25 MILLIGRAM(S): at 06:47

## 2019-05-13 RX ADMIN — Medication 650 MILLIGRAM(S): at 06:47

## 2019-05-13 RX ADMIN — LISINOPRIL 2.5 MILLIGRAM(S): 2.5 TABLET ORAL at 06:47

## 2019-05-13 RX ADMIN — Medication 3 MILLILITER(S): at 03:00

## 2019-05-13 NOTE — DISCHARGE NOTE PROVIDER - CARE PROVIDER_API CALL
Magdalena Wahl)  Cardiology; Cardiovascular Disease; Internal Medicine  [address] [place] [address]  Phone: [phone]  Fax: [phone]  Follow Up Time:

## 2019-05-13 NOTE — CONSULT NOTE ADULT - SUBJECTIVE AND OBJECTIVE BOX
[facility] Practice                                                        Office: [address] [address]                                                       Telephone: [phone]. Fax:[phone]    Patient is a 83y old  Female who presents with a chief complaint of closed loop small bowel obstruction (13 May 2019 02:20)      HPI: 84 y/o with hx of CAD s/p CABG x3 in , hypertension, hyperlipidemia, moderate carotid atherosclerosis, breast cancer,  who initially presented 3 days ago with abdominal pain, nausea and vomiting.  Noted to have a small bowel obstruction.  She underwent ex lap, TERESA, small bowel resection and anastamosis and subsequently found with a mass in the distal ileum.  She normally resides in [facility] and comes annually to visit her doctors in the .  For the past 2 weeks has been having fatigue, and mild chest pain.  She went to her doctors in [facility] who noted a change in her ECG and advised her for further testing, which prompted [place].  On admission had a LBBB which is new from .  Last night had acute onset severe chest pressure after OGT was removed.  ECG performed at the time showed LBBB but did also have 2-3 mm IVONE anteroseptally.  Troponins overnight were negative.  Currently she is chest pain free.   Interviewed with the aid of  via [employer] services    PAST MEDICAL & SURGICAL HISTORY:  Osteoarthritis  Osteopenia  Breast cancer  Hyperlipidemia  Hypertension  Carotid stenosis  CAD (coronary artery disease)  S/P laparoscopic cholecystectomy  H/O benign breast biopsy: right, 10/1/14  S/P lumpectomy, right breast  S/P CABG x 3:       Allergies  No Known Allergies  Intolerances    Home Medications:  acetaminophen 325 mg oral tablet: 2 tab(s) orally every 6 hours, As needed, Mild Pain (1 - 3) (2018 11:26)  Aspir 81 81 mg oral tablet: 1 tab(s) orally once a day (2018 14:24)  atorvastatin 10 mg oral tablet: 1 tab(s) orally once a day (at bedtime) (2018 14:24)  diltiazem 120 mg oral tablet: 1 tab(s) orally once a day (2018 14:24)  lisinopril 2.5 mg oral tablet: 1 tab(s) orally once a day (2018 14:24)  metoprolol succinate 25 mg oral tablet, extended release: 1 tab(s) orally once a day (2018 14:24)  Plavix 75 mg oral tablet: 1 tab(s) orally once a day (2018 14:24)  Vistaril: 1 tab(s) orally 2 times a day (2018 14:24)  zetia: 1 tab(s) orally once a day (2018 14:24)      MEDICATIONS  (STANDING):  acetaminophen   Tablet ..      acetaminophen   Tablet .. 650 milliGRAM(s) Oral every 6 hours  ALBUTerol    90 MICROgram(s) HFA Inhaler 1 Puff(s) Inhalation every 4 hours  ALBUTerol/ipratropium for Nebulization 3 milliLiter(s) Nebulizer every 6 hours  diltiazem    milliGRAM(s) Oral daily  enoxaparin Injectable 40 milliGRAM(s) SubCutaneous daily  lactated ringers. 1000 milliLiter(s) (90 mL/Hr) IV Continuous <Continuous>  lisinopril 2.5 milliGRAM(s) Oral daily  metoprolol succinate ER 25 milliGRAM(s) Oral daily  metoprolol tartrate Injectable 5 milliGRAM(s) IV Push every 6 hours  pantoprazole  Injectable 40 milliGRAM(s) IV Push daily  tiotropium 18 MICROgram(s) Capsule 1 Capsule(s) Inhalation daily    MEDICATIONS  (PRN):  ondansetron Injectable 4 milliGRAM(s) IV Push every 6 hours PRN Nausea and/or Vomiting      FAMILY HISTORY:  Family history of heart disease (Sibling): 6 siblings had CABG, 8 siblings had heart disease      SOCIAL HISTORY: No tobacco/ No etoh/ No illicit drug use    PREVIOUS DIAGNOSTIC TESTING:  NONE IN OUR SYSTEM    ECHO FINDINGS:    STRESS FINDINGS:  CATHETERIZATION FINDINGS:     REVIEW OF SYSTEMS:  CONSTITUTIONAL: [-]fever   [-] weight loss   [+] fatigue  EYES: [-]  eye pain   [-] visual disturbances      [-]  discharge  ENMT:  [-]  difficulty hearing,   [-]  tinnitus   [-] vertigo    [-]  sinus or throat pain  NECK: [-]  pain or stiffness  RESPIRATORY: [-]  cough 	[-] wheezing 	[-]  hemoptysis 		[-]   Shortness of Breath  CARDIOVASCULAR: [+]  chest pain	[-] palpitations		[-]  passing out 		[-] dizziness 	[-]  leg swelling  		[-]  PND 	[-] orthopnea  GASTROINTESTINAL: [+]  abdominal pain		[+]nausea	[+] vomiting	[-]  hematemesis 	[-]  diarrhea  	[-] constipation 		[-]  melena 	[-] hematochezia.  GENITOURINARY: [-] dysuria	[-] frequency	[-] hematuria	[-]  incontinence  NEUROLOGICAL: [-]  headaches		[-] memory loss 	[-]  loss of strength  			[-]  numbness/tingling 	[-]  tremors  SKIN: [-]  itching 	[-] rashes 	[-]  lesions   LYMPH Nodes: [-] enlarged glands  ENDOCRINE: [-] heat or cold intolerance 	[-]   hair loss  MUSCULOSKELETAL: [-] joint pain  [-] joint swelling	[-]  muscle, back, or extremity pain  PSYCHIATRIC: [-]  depression	[-] anxiety	[-] mood swings		[-]  difficulty sleeping   HEME: [-]  easy bruising 	[-]  bleeding   ALLERY AND IMMUNOLOGIC: [-]  hives or eczema	      Vital Signs Last 24 Hrs  T(C): 36.4 (13 May 2019 08:19), Max: 37.1 (13 May 2019 05:33)  T(F): 97.6 (13 May 2019 08:19), Max: 98.7 (13 May 2019 05:33)  HR: 88 (13 May 2019 08:19) (60 - 88)  BP: 132/64 (13 May 2019 08:19) (121/56 - 146/64)  BP(mean): --  RR: 18 (13 May 2019 08:19) (18 - 20)  SpO2: 98% (13 May 2019 08:19) (90% - 98%)  Daily     Daily   I&O's Detail    12 May 2019 07:01  -  13 May 2019 07:00  --------------------------------------------------------  IN:  Total IN: 0 mL    OUT:    Voided: 400 mL  Total OUT: 400 mL    Total NET: -400 mL            PHYSICAL EXAM:  Appearance: Normal, well nourished, NAD	  HEENT:   Normal oral mucosa, PERRL, EOMI, sclera non-icteric	  Lymphatic: No cervical lymphadenopathy  Cardiovascular: Normal S1 S2, No JVD, III/VI systolic murmur, No carotid bruits, no edema  Respiratory: Lungs clear to auscultation	  Psychiatry: A & O x 3, Mood & affect appropriate  Gastrointestinal:  Soft, Non-tender, surgical site with dressing. 	  Skin: No rashes, No ecchymoses, No cyanosis, Dry  Neurologic: Grossly non-focal with full strength in all four extremities  Extremities: Normal range of motion, No clubbing, cyanosis or edema  Vascular: Peripheral pulses palpable 2+ bilaterally, warm; bilateral LE varicosities    INTERPRETATION OF TELEMETRY: sr, no ventricular ectopy    ECG (tracing reviewed by me): SR, LBBB    LABS:                        8.7    15.1  )-----------( 257      ( 13 May 2019 01:31 )             26.6         137  |  102  |  20.0  ----------------------------<  174<H>  3.8   |  20.0<L>  |  0.85    Ca    8.0<L>      12 May 2019 23:32  Phos  2.1       Mg     1.7         TPro  7.3  /  Alb  4.0  /  TBili  0.7  /  DBili  x   /  AST  43<H>  /  ALT  26  /  AlkPhos  172<H>  0511    CARDIAC MARKERS ( 12 May 2019 23:32 )  x     / <0.01 ng/mL / x     / x     / x          PT/INR - ( 11 May 2019 23:34 )   PT: 12.4 sec;   INR: 1.08 ratio         PTT - ( 11 May 2019 23:34 )  PTT:28.7 sec  Urinalysis Basic - ( 12 May 2019 00:57 )    Color: Yellow / Appearance: Clear / S.005 / pH: x  Gluc: x / Ketone: Trace  / Bili: Negative / Urobili: Negative mg/dL   Blood: x / Protein: 30 mg/dL / Nitrite: Negative   Leuk Esterase: Negative / RBC: 0-2 /HPF / WBC 3-5   Sq Epi: x / Non Sq Epi: Occasional / Bacteria: Occasional      BNP    RADIOLOGY & ADDITIONAL STUDIES:  CXR (image reviewed by me): [facility]                                                        Office: [address] [address]                                                       Telephone: [phone]. Fax:[phone]    Patient is a 83y old  Female who presents with a chief complaint of closed loop small bowel obstruction (13 May 2019 02:20)      HPI: 82 y/o with hx of CAD s/p CABG x3 in , hypertension, hyperlipidemia, moderate carotid atherosclerosis, breast cancer,  who initially presented 3 days ago with abdominal pain, nausea and vomiting.  Noted to have a small bowel obstruction.  She underwent ex lap, TERESA, small bowel resection and anastamosis and subsequently found with a mass in the distal ileum.  She normally resides in [facility] and comes annually to visit her doctors [place] the [place].  For the past 2 weeks has been having fatigue, and mild chest pain.  She went to her doctors in [facility] who noted a change in her ECG and advised her for further testing, which prompted [place] to come back [place].  On admission had a LBBB which is new from 2018.  Last night had acute onset severe chest pressure after OGT was removed.  ECG performed at the time showed LBBB but did also have 2-3 mm IVONE anteroseptally.  Troponins overnight were negative.  Currently she is chest pain free.   Interviewed with the aid of  via [employer] services    PAST MEDICAL & SURGICAL HISTORY:  Osteoarthritis  Osteopenia  Breast cancer  Hyperlipidemia  Hypertension  Carotid stenosis  CAD (coronary artery disease)  S/P laparoscopic cholecystectomy  H/O benign breast biopsy: right, 10/1/14  S/P lumpectomy, right breast  S/P CABG x 3:       Allergies  No Known Allergies  Intolerances    Home Medications:  acetaminophen 325 mg oral tablet: 2 tab(s) orally every 6 hours, As needed, Mild Pain (1 - 3) (2018 11:26)  Aspir 81 81 mg oral tablet: 1 tab(s) orally once a day (2018 14:24)  atorvastatin 10 mg oral tablet: 1 tab(s) orally once a day (at bedtime) (2018 14:24)  diltiazem 120 mg oral tablet: 1 tab(s) orally once a day (2018 14:24)  lisinopril 2.5 mg oral tablet: 1 tab(s) orally once a day (2018 14:24)  metoprolol succinate 25 mg oral tablet, extended release: 1 tab(s) orally once a day (2018 14:24)  Plavix 75 mg oral tablet: 1 tab(s) orally once a day (2018 14:24)  Vistaril: 1 tab(s) orally 2 times a day (2018 14:24)  zetia: 1 tab(s) orally once a day (2018 14:24)      MEDICATIONS  (STANDING):  acetaminophen   Tablet ..      acetaminophen   Tablet .. 650 milliGRAM(s) Oral every 6 hours  ALBUTerol    90 MICROgram(s) HFA Inhaler 1 Puff(s) Inhalation every 4 hours  ALBUTerol/ipratropium for Nebulization 3 milliLiter(s) Nebulizer every 6 hours  diltiazem    milliGRAM(s) Oral daily  enoxaparin Injectable 40 milliGRAM(s) SubCutaneous daily  lactated ringers. 1000 milliLiter(s) (90 mL/Hr) IV Continuous <Continuous>  lisinopril 2.5 milliGRAM(s) Oral daily  metoprolol succinate ER 25 milliGRAM(s) Oral daily  metoprolol tartrate Injectable 5 milliGRAM(s) IV Push every 6 hours  pantoprazole  Injectable 40 milliGRAM(s) IV Push daily  tiotropium 18 MICROgram(s) Capsule 1 Capsule(s) Inhalation daily    MEDICATIONS  (PRN):  ondansetron Injectable 4 milliGRAM(s) IV Push every 6 hours PRN Nausea and/or Vomiting      FAMILY HISTORY:  Family history of heart disease (Sibling): 6 siblings had CABG, 8 siblings had heart disease      SOCIAL HISTORY: No tobacco/ No etoh/ No illicit drug use    PREVIOUS DIAGNOSTIC TESTING:  NONE IN OUR SYSTEM    ECHO FINDINGS:    STRESS FINDINGS:  CATHETERIZATION FINDINGS:     REVIEW OF SYSTEMS:  CONSTITUTIONAL: [-]fever   [-] weight loss   [+] fatigue  EYES: [-]  eye pain   [-] visual disturbances      [-]  discharge  ENMT:  [-]  difficulty hearing,   [-]  tinnitus   [-] vertigo    [-]  sinus or throat pain  NECK: [-]  pain or stiffness  RESPIRATORY: [-]  cough 	[-] wheezing 	[-]  hemoptysis 		[-]   Shortness of Breath  CARDIOVASCULAR: [+]  chest pain	[-] palpitations		[-]  passing out 		[-] dizziness 	[-]  leg swelling  		[-]  PND 	[-] orthopnea  GASTROINTESTINAL: [+]  abdominal pain		[+]nausea	[+] vomiting	[-]  hematemesis 	[-]  diarrhea  	[-] constipation 		[-]  melena 	[-] hematochezia.  GENITOURINARY: [-] dysuria	[-] frequency	[-] hematuria	[-]  incontinence  NEUROLOGICAL: [-]  headaches		[-] memory loss 	[-]  loss of strength  			[-]  numbness/tingling 	[-]  tremors  SKIN: [-]  itching 	[-] rashes 	[-]  lesions   LYMPH Nodes: [-] enlarged glands  ENDOCRINE: [-] heat or cold intolerance 	[-]   hair loss  MUSCULOSKELETAL: [-] joint pain  [-] joint swelling	[-]  muscle, back, or extremity pain  PSYCHIATRIC: [-]  depression	[-] anxiety	[-] mood swings		[-]  difficulty sleeping   HEME: [-]  easy bruising 	[-]  bleeding   ALLERY AND IMMUNOLOGIC: [-]  hives or eczema	      Vital Signs Last 24 Hrs  T(C): 36.4 (13 May 2019 08:19), Max: 37.1 (13 May 2019 05:33)  T(F): 97.6 (13 May 2019 08:19), Max: 98.7 (13 May 2019 05:33)  HR: 88 (13 May 2019 08:19) (60 - 88)  BP: 132/64 (13 May 2019 08:19) (121/56 - 146/64)  BP(mean): --  RR: 18 (13 May 2019 08:19) (18 - 20)  SpO2: 98% (13 May 2019 08:19) (90% - 98%)  Daily     Daily   I&O's Detail    12 May 2019 07:01  -  13 May 2019 07:00  --------------------------------------------------------  IN:  Total IN: 0 mL    OUT:    Voided: 400 mL  Total OUT: 400 mL    Total NET: -400 mL            PHYSICAL EXAM:  Appearance: Normal, well nourished, NAD	  HEENT:   Normal oral mucosa, PERRL, EOMI, sclera non-icteric	  Lymphatic: No cervical lymphadenopathy  Cardiovascular: Normal S1 S2, No JVD, III/VI systolic murmur, No carotid bruits, no edema  Respiratory: Lungs clear to auscultation	  Psychiatry: A & O x 3, Mood & affect appropriate  Gastrointestinal:  Soft, Non-tender, surgical site with dressing. 	  Skin: No rashes, No ecchymoses, No cyanosis, Dry  Neurologic: Grossly non-focal with full strength in all four extremities  Extremities: Normal range of motion, No clubbing, cyanosis or edema  Vascular: Peripheral pulses palpable 2+ bilaterally, warm; bilateral LE varicosities    INTERPRETATION OF TELEMETRY: sr, no ventricular ectopy    ECG (tracing reviewed by me): SR, LBBB, dynamic IVONE with chest pain, immediately repeated with resolution of IVONE    LABS:                        8.7    15.1  )-----------( 257      ( 13 May 2019 01:31 )             26.6         137  |  102  |  20.0  ----------------------------<  174<H>  3.8   |  20.0<L>  |  0.85    Ca    8.0<L>      12 May 2019 23:32  Phos  2.1       Mg     1.7         TPro  7.3  /  Alb  4.0  /  TBili  0.7  /  DBili  x   /  AST  43<H>  /  ALT  26  /  AlkPhos  172<H>      CARDIAC MARKERS ( 12 May 2019 23:32 )  x     / <0.01 ng/mL / x     / x     / x          PT/INR - ( 11 May 2019 23:34 )   PT: 12.4 sec;   INR: 1.08 ratio         PTT - ( 11 May 2019 23:34 )  PTT:28.7 sec  Urinalysis Basic - ( 12 May 2019 00:57 )    Color: Yellow / Appearance: Clear / S.005 / pH: x  Gluc: x / Ketone: Trace  / Bili: Negative / Urobili: Negative mg/dL   Blood: x / Protein: 30 mg/dL / Nitrite: Negative   Leuk Esterase: Negative / RBC: 0-2 /HPF / WBC 3-5   Sq Epi: x / Non Sq Epi: Occasional / Bacteria: Occasional      BNP    RADIOLOGY & ADDITIONAL STUDIES:  CXR (image reviewed by me): < from: Xray Chest 1 View-PORTABLE IMMEDIATE (19 @ 01:17) >  omparison: Chest x-ray one day prior    The NG tube is in the stomach. Status post sternotomy and CABG. Normal   heart size. Clear lungs. No effusion.    IMPRESSION: The NG tube is in the stomach.                    LENA HUGHES M.D., ATTENDING RADIOLOGIST    < end of copied text >      < from: CT Abdomen and Pelvis w/ IV Cont (19 @ 20:24) >  IMPRESSION:     Small bowel obstruction with suggestion of a small bowel mass. Mesenteric   edema and 2 adjacent transition points, suspicious for closed loop small   bowel obstruction.    Choledocholithiasis with intrahepatic and extrahepatic biliary ductal   dilatation.    [facility] radiologist Dr. Cotton issued a preliminary report which was reviewed   by Dr. Tenorio.                  BREANNA DELEON M.D., ATTENDING RADIOLOGIST  This document has been electronically signed. May 12 2019  9:30AM        < end of copied text >    < from: US Duplex Venous Lower Ext Complete, Bilateral (19 @ 08:57) >  IMPRESSION:     No evidence of bilateral lower extremity deep venous thrombosis.    < end of copied text >

## 2019-05-13 NOTE — DISCHARGE NOTE PROVIDER - NSFOLLOWUPCLINICS_GEN_ALL_ED_FT
[facility] Acute Care Surgery  Acute Care Surgery  [address] [place] [id number]  Phone: [phone]  Fax:   Follow Up Time:

## 2019-05-13 NOTE — DISCHARGE NOTE PROVIDER - NSDCCPTREATMENT_GEN_ALL_CORE_FT
PRINCIPAL PROCEDURE  Procedure: Exploratory laparotomy with bowel resection  Findings and Treatment:       SECONDARY PROCEDURE  Procedure: Small bowel resection with anastomosis  Findings and Treatment:     Procedure: Open lysis of abdominal adhesions  Findings and Treatment:

## 2019-05-13 NOTE — PROGRESS NOTE ADULT - SUBJECTIVE AND OBJECTIVE BOX
83y Female s/p exploratory laparotomy under GETA on 5/11/19    T(C): 36.8 (05-13-19 @ 11:30), Max: 37.1 (05-13-19 @ 05:33)  HR: 52 (05-13-19 @ 14:51) (52 - 93)  BP: 140/62 (05-13-19 @ 14:51) (105/54 - 156/57)  RR: 17 (05-13-19 @ 14:51) (16 - 20)  SpO2: 100% (05-13-19 @ 14:51) (90% - 100%)  Wt(kg): --    no anesthesia complications or complaints noted or reported.

## 2019-05-13 NOTE — PROGRESS NOTE ADULT - SUBJECTIVE AND OBJECTIVE BOX
s/p bowel resection May 12  · Operative Findings	small bowel mass identified in distal ileum, resected with approximately 8 cm margin on either side, primary side to side stapled anastomosis small bowel run in its entirety and no other pathology identified	  NPO  Aspirin 81 mg ordered  To have LHC for new LBBB and chest pain post operatively  Right IJ intact  T&S current  Dr Edwards to consent  Daughter will speak w/pt prior to procedure  ASA 3 Mallampati 2  BR 7.5%  Creatinine 0.85  5'0" 119 lbs          REVIEW OF SYSTEMS:  Denies SOB, CP, NV, HA, dizziness, palpitations    PHYSICAL EXAM: A&Ox3 NAD Skin warm and dry  NEURO: Speech intact +gag +swallow Tongue midline MAYORGA  NECK: No JVD, trachea midline. Eupneic  HEART: SR wide complex LBBB on 12 ECG    PULMONARY:  CTA rina  ABDOMEN: Soft nontender X4 +BS DSD midline sl tender RLQ +BS

## 2019-05-13 NOTE — PROGRESS NOTE ADULT - SUBJECTIVE AND OBJECTIVE BOX
s/p LHC RFA w/mynx  Verbal report:  RCA and patent LM/LAD stents  distal aorta 60%  Tolerated procedure well w/o complications  Seen post procedure by Dr. Edwards  Daughter at bedside      REVIEW OF SYSTEMS:  Denies SOB, CP, NV, HA, dizziness, palpitations, site pain    PHYSICAL EXAM: A&Ox3 NAD Skin warm and dry  NEURO: Speech intact +gag +swallow Tongue midline MAYORGA  NECK: No JVD, trachea midline. Eupneic Rt IJ intact  HEART: SB 53 bpm  PULMONARY:  CTA rina  ABDOMEN: Soft nontender X4 +BS DSD intact  EXTREMITIES: RFA site: No bleed, hematoma, pain, ecchymosis or swelling Rt DP/PT+

## 2019-05-13 NOTE — CONSULT NOTE ADULT - ASSESSMENT
84 y/o with hx of CAD s/p CABG x3 in 2011, hypertension, hyperlipidemia, moderate carotid atherosclerosis, breast cancer,  who initially presented 3 days ago with abdominal pain, nausea and vomiting.  Noted to have a small bowel obstruction.  She underwent ex lap, TERESA, small bowel resection and anastamosis and subsequently found with a mass in the distal ileum.  She normally resides in [facility] and comes annually to visit [place] the [place].  For the past 2 weeks has been having fatigue, and mild chest pain.  She went to her doctors in [facility] who noted a change in her ECG and advised her for further testing, which prompted her to come back [place] the [place].  On admission had a LBBB which is new from 2018.  Last night had acute onset severe chest pressure after OGT was removed.  ECG performed at the time showed LBBB but did also have 2-3 mm IVONE anteroseptally.  Troponins overnight were negative.  Currently she is chest pain free.   Interviewed with the aid of  via [employer] services    ECG (tracing reviewed by me): SR, LBBB, dynamic VIONE with chest pain, immediately repeated with resolution of IVONE  Echo reviewed by me while it was being performed - Normal biventricular systolic function.  Estimated LVEF 55-60%, no severe valvular disease, no obvious regional wall motion abnormalities.     # unstable angina   # POD 2 for SBO - exlap/TERESA/SB resection  # ileum mass   # new LBBB (from 2018)    Echo without regional wall motion abnormalities.  ECG with dynamic ST changes with chest pain. Currently chest pain free  Urgent cardiac catheterization today  Discussed with surgical team - ok to receive IV heparin bolus as needed during cath  ASA/Plavix  statin  metoprolol - will uptitrate  await pathology results of ileum mass  continue telemetry 82 y/o with hx of CAD s/p CABG x3 in 2011, hypertension, hyperlipidemia, moderate carotid atherosclerosis, breast cancer,  who initially presented 3 days ago with abdominal pain, nausea and vomiting.  Noted to have a small bowel obstruction.  She underwent ex lap, TERESA, small bowel resection and anastamosis and subsequently found with a mass in the distal ileum.  She normally resides in [facility] and comes annually to visit her doctors in the [place].  For the past 2 weeks has been having fatigue, and mild chest pain.  She went to her doctors in [facility] who noted a change in her ECG and advised her for further testing, which prompted her to come back [place] the [place].  On admission had a LBBB which is new from 2018.  Last night had acute onset severe chest pressure after OGT was removed.  ECG performed at the time showed LBBB but did also have 2-3 mm IVONE anteroseptally.  Troponins overnight were negative.  Currently she is chest pain free.   Interviewed with the aid of  via [employer] services    ECG (tracing reviewed by me): SR, LBBB, dynamic IVONE with chest pain, immediately repeated with resolution of IVONE  Echo reviewed by me while it was being performed - Normal biventricular systolic function.  Estimated LVEF 55-60%, no severe valvular disease, no obvious regional wall motion abnormalities.     # unstable angina   # POD 2 for SBO - exlap/TERESA/SB resection  # ileum mass   # new LBBB (from 2018)    Echo without regional wall motion abnormalities.  ECG with dynamic ST changes with chest pain. Currently chest pain free  Urgent cardiac catheterization today  Discussed with surgical team - ok to receive IV heparin bolus as needed during cath  ASA/Plavix  statin  metoprolol - will uptitrate  await pathology results of ileum mass  continue telemetry    D/W surgical team, daughter Nidhi (via phone), and patient

## 2019-05-13 NOTE — CHART NOTE - NSCHARTNOTEFT_GEN_A_CORE
ACS resident called to bedside by nurse with patient complaint of chest pain x 30 minutes prior to arrival. Patient seen and evaluated at bedside. Pt complaining of l-sided chest pain - improvement over the hour. Cards work up: CXR WNL, EKG demonstrated LBBB (stable), ABG ACS resident called to bedside by nurse at 22:50 with patient complaint of chest pain x 30 minutes prior to call. Patient seen and evaluated at bedside. Pt complaining of left-sided chest pain - improvement over the hour. Denies shortness of breath, difficulty breathing. Cards work up: CXR WNL, EKG demonstrated LBBB (stable), ABG hypoxia, A-a gradient 43 (24.5), troponin negative x 1. BMP demonstrates potassium WNL, hgb ____.     Physical exam:  Well appearing, no acute distress  Lungs CTAB, no increased work of breathing nor use of accessory muscles  Cards RRR, normal S1S2  Abdomen soft, nondistended, appropriately tender post-operatively, dressing c/d/i  Extremities 2+pulses, nontender, nonedematous       83F h/o CAD s/p CABG x 3, breast cancer s/p lumpectomy now s/p ex-lap and small bowel resection 2/2 closed loop small bowel obstruction complaining of shortness of breath  - cards consult in AM  - trend troponins  - f/u hgb, transfuse as needed  - PE workup, CTA  - restarted home cardiology medications  - Lopressor 5 mg -> lopressor 25 mg (home dose), cardizem 120mg qd, lisinopril 2.5mg qd ACS resident called to bedside by nurse at 22:50 with patient complaint of chest pain x 30 minutes prior to call. Patient seen and evaluated at bedside. Pt complaining of left-sided chest pain - improvement over the hour but worsens with inhalation. Denies shortness of breath, difficulty breathing. Cards work up: CXR WNL, EKG demonstrated LBBB (stable), ABG hypoxia, A-a gradient 43 (24.5), troponin negative x 1. BMP demonstrates potassium WNL, hgb 8.7.     Physical exam:  Well appearing, no acute distress  Lungs CTAB, no increased work of breathing nor use of accessory muscles  Cards RRR, normal S1S2  Abdomen soft, nondistended, appropriately tender post-operatively, dressing c/d/i  Extremities 2+pulses, nontender, nonedematous       83F h/o CAD s/p CABG x 3, breast cancer s/p lumpectomy now s/p ex-lap and small bowel resection 2/2 closed loop small bowel obstruction complaining of chest pain and pleurisy  - cards consult in AM  - trend troponins  - chest PT, aggressive IS, duonebs  - f/u b/l LE duplex  - restarted home cardiology medications  - Lopressor 5 mg -> lopressor 25 mg (home dose), cardizem 120mg qd, lisinopril 2.5mg qd ACS resident called to bedside by nurse at 22:50 with patient complaint of chest pain x 30 minutes prior to call. Patient seen and evaluated at bedside. Pt complaining of left-sided chest pain - improvement over the hour but worsens with inhalation. Denies shortness of breath, difficulty breathing. Cards work up: CXR WNL, EKG demonstrated LBBB (stable), ABG hypoxia, A-a gradient 43 (24.5), troponin negative x 1. BMP demonstrates potassium WNL, hgb 8.7. Started on 2L NC    Physical exam:  Well appearing, no acute distress  Lungs CTAB, no increased work of breathing nor use of accessory muscles  Cards RRR, normal S1S2  Abdomen soft, nondistended, appropriately tender post-operatively, dressing c/d/i  Extremities 2+pulses, nontender, nonedematous       83F h/o CAD s/p CABG x 3, breast cancer s/p lumpectomy now s/p ex-lap and small bowel resection 2/2 closed loop small bowel obstruction complaining of chest pain and pleurisy  - cards consult in AM  - trend troponins  - chest PT, aggressive IS, duonebs  - f/u b/l LE duplex  - restarted home cardiology medications  - Lopressor 5 mg -> lopressor 25 mg (home dose), cardizem 120mg qd, lisinopril 2.5mg qd

## 2019-05-13 NOTE — PROGRESS NOTE ADULT - SUBJECTIVE AND OBJECTIVE BOX
Pt complaining of chest pain over night. ACS resident called to bedside by nurse at 22:50 with patient complaint of chest pain x 30 minutes prior to call. Patient seen and evaluated at bedside. Pt complaining of left-sided chest pain - improvement over the hour but worsens with inhalation. Denies shortness of breath, difficulty breathing. Cards work up: CXR WNL, EKG demonstrated LBBB (stable), ABG hypoxia, A-a gradient 43 (24.5), troponin negative x 1. BMP demonstrates potassium WNL, hgb 8.7.     MEDICATIONS  (STANDING):  acetaminophen   Tablet ..      acetaminophen   Tablet .. 650 milliGRAM(s) Oral every 6 hours  ALBUTerol    90 MICROgram(s) HFA Inhaler 1 Puff(s) Inhalation every 4 hours  ALBUTerol/ipratropium for Nebulization 3 milliLiter(s) Nebulizer every 6 hours  diltiazem    milliGRAM(s) Oral daily  enoxaparin Injectable 40 milliGRAM(s) SubCutaneous daily  lactated ringers. 1000 milliLiter(s) (90 mL/Hr) IV Continuous <Continuous>  lisinopril 2.5 milliGRAM(s) Oral daily  metoprolol succinate ER 25 milliGRAM(s) Oral daily  metoprolol tartrate Injectable 5 milliGRAM(s) IV Push every 6 hours  pantoprazole  Injectable 40 milliGRAM(s) IV Push daily  tiotropium 18 MICROgram(s) Capsule 1 Capsule(s) Inhalation daily    MEDICATIONS  (PRN):  ondansetron Injectable 4 milliGRAM(s) IV Push every 6 hours PRN Nausea and/or Vomiting      Vital Signs Last 24 Hrs  T(C): 36.7 (12 May 2019 20:39), Max: 36.9 (12 May 2019 17:00)  T(F): 98.1 (12 May 2019 20:39), Max: 98.4 (12 May 2019 17:00)  HR: 70 (12 May 2019 23:36) (58 - 82)  BP: 137/61 (12 May 2019 23:36) (110/73 - 162/51)  BP(mean): 79 (12 May 2019 04:45) (65 - 86)  RR: 18 (12 May 2019 20:39) (15 - 20)  SpO2: 94% (12 May 2019 20:39) (90% - 100%)        Physical exam:  Well appearing, no acute distress  Lungs CTAB, no increased work of breathing nor use of accessory muscles  Cards RRR, normal S1S2  Abdomen soft, nondistended, appropriately tender post-operatively, dressing c/d/i  Extremities 2+pulses, nontender, nonedematous     I&O's Detail    11 May 2019 07:01  -  12 May 2019 07:00  --------------------------------------------------------  IN:    lactated ringers.: 125 mL  Total IN: 125 mL    OUT:    Indwelling Catheter - Urethral: 35 mL  Total OUT: 35 mL    Total NET: 90 mL      12 May 2019 07:01  -  13 May 2019 02:21  --------------------------------------------------------  IN:  Total IN: 0 mL    OUT:    Voided: 400 mL  Total OUT: 400 mL    Total NET: -400 mL          LABS:                        8.7    15.1  )-----------( 257      ( 13 May 2019 01:31 )             26.6         137  |  102  |  20.0  ----------------------------<  174<H>  3.8   |  20.0<L>  |  0.85    Ca    8.0<L>      12 May 2019 23:32  Phos  2.1       Mg     1.7         TPro  7.3  /  Alb  4.0  /  TBili  0.7  /  DBili  x   /  AST  43<H>  /  ALT  26  /  AlkPhos  172<H>  11    PT/INR - ( 11 May 2019 23:34 )   PT: 12.4 sec;   INR: 1.08 ratio         PTT - ( 11 May 2019 23:34 )  PTT:28.7 sec  Urinalysis Basic - ( 12 May 2019 00:57 )    Color: Yellow / Appearance: Clear / S.005 / pH: x  Gluc: x / Ketone: Trace  / Bili: Negative / Urobili: Negative mg/dL   Blood: x / Protein: 30 mg/dL / Nitrite: Negative   Leuk Esterase: Negative / RBC: 0-2 /HPF / WBC 3-5   Sq Epi: x / Non Sq Epi: Occasional / Bacteria: Occasional        RADIOLOGY & ADDITIONAL STUDIES: Pt complaining of chest pain over night. ACS resident called to bedside by nurse at 22:50 with patient complaint of chest pain x 30 minutes prior to call. Patient seen and evaluated at bedside. Pt complaining of left-sided chest pain - improvement over the hour but worsens with inhalation. Denies shortness of breath, difficulty breathing. Cards work up: CXR WNL, EKG demonstrated LBBB (stable), ABG hypoxia, A-a gradient 43 (24.5), troponin negative x 1. BMP demonstrates potassium WNL, hgb 8.7. 2L NC started.    MEDICATIONS  (STANDING):  acetaminophen   Tablet ..      acetaminophen   Tablet .. 650 milliGRAM(s) Oral every 6 hours  ALBUTerol    90 MICROgram(s) HFA Inhaler 1 Puff(s) Inhalation every 4 hours  ALBUTerol/ipratropium for Nebulization 3 milliLiter(s) Nebulizer every 6 hours  diltiazem    milliGRAM(s) Oral daily  enoxaparin Injectable 40 milliGRAM(s) SubCutaneous daily  lactated ringers. 1000 milliLiter(s) (90 mL/Hr) IV Continuous <Continuous>  lisinopril 2.5 milliGRAM(s) Oral daily  metoprolol succinate ER 25 milliGRAM(s) Oral daily  metoprolol tartrate Injectable 5 milliGRAM(s) IV Push every 6 hours  pantoprazole  Injectable 40 milliGRAM(s) IV Push daily  tiotropium 18 MICROgram(s) Capsule 1 Capsule(s) Inhalation daily    MEDICATIONS  (PRN):  ondansetron Injectable 4 milliGRAM(s) IV Push every 6 hours PRN Nausea and/or Vomiting      Vital Signs Last 24 Hrs  T(C): 36.7 (12 May 2019 20:39), Max: 36.9 (12 May 2019 17:00)  T(F): 98.1 (12 May 2019 20:39), Max: 98.4 (12 May 2019 17:00)  HR: 70 (12 May 2019 23:36) (58 - 82)  BP: 137/61 (12 May 2019 23:36) (110/73 - 162/51)  BP(mean): 79 (12 May 2019 04:45) (65 - 86)  RR: 18 (12 May 2019 20:39) (15 - 20)  SpO2: 94% (12 May 2019 20:39) (90% - 100%)        Physical exam:  Well appearing, no acute distress  Lungs CTAB, no increased work of breathing nor use of accessory muscles  Cards RRR, normal S1S2  Abdomen soft, nondistended, appropriately tender post-operatively, dressing c/d/i  Extremities 2+pulses, nontender, nonedematous     I&O's Detail    11 May 2019 07:01  -  12 May 2019 07:00  --------------------------------------------------------  IN:    lactated ringers.: 125 mL  Total IN: 125 mL    OUT:    Indwelling Catheter - Urethral: 35 mL  Total OUT: 35 mL    Total NET: 90 mL      12 May 2019 07:01  -  13 May 2019 02:21  --------------------------------------------------------  IN:  Total IN: 0 mL    OUT:    Voided: 400 mL  Total OUT: 400 mL    Total NET: -400 mL          LABS:                        8.7    15.1  )-----------( 257      ( 13 May 2019 01:31 )             26.6     12    137  |  102  |  20.0  ----------------------------<  174<H>  3.8   |  20.0<L>  |  0.85    Ca    8.0<L>      12 May 2019 23:32  Phos  2.1       Mg     1.7         TPro  7.3  /  Alb  4.0  /  TBili  0.7  /  DBili  x   /  AST  43<H>  /  ALT  26  /  AlkPhos  172<H>  0511    PT/INR - ( 11 May 2019 23:34 )   PT: 12.4 sec;   INR: 1.08 ratio         PTT - ( 11 May 2019 23:34 )  PTT:28.7 sec  Urinalysis Basic - ( 12 May 2019 00:57 )    Color: Yellow / Appearance: Clear / S.005 / pH: x  Gluc: x / Ketone: Trace  / Bili: Negative / Urobili: Negative mg/dL   Blood: x / Protein: 30 mg/dL / Nitrite: Negative   Leuk Esterase: Negative / RBC: 0-2 /HPF / WBC 3-5   Sq Epi: x / Non Sq Epi: Occasional / Bacteria: Occasional        RADIOLOGY & ADDITIONAL STUDIES:

## 2019-05-13 NOTE — DISCHARGE NOTE PROVIDER - NSDCFUADDINST_GEN_ALL_CORE_FT
No heavy lifting, driving, sex, tub baths, swimming, or any activity that submerges the lower half of the body in water for 48 hours.  Limited walking and stairs for 48 hours.    Change the bandaid after 24 hours and every 24 hours after that.  Keep the puncture site dry and covered with a bandaid until a scab forms.    Observe the site frequently.  If bleeding or a large lump (the size of a golf ball or bigger) occurs lie flat, apply continuous direct pressure just above the puncture site for at least 10 minutes, and notify your physician immediately.  If the bleeding cannot be controlled, call 911 immediately for assistance.  Notify your physician of pain, swelling or any drainage.    Notify your physician immediately if coldness, numbness, discoloration or pain in your foot occurs.  	remove right groin dressing by 5/14/19

## 2019-05-13 NOTE — PROGRESS NOTE ADULT - ASSESSMENT
83F h/o CAD s/p CABG x 3, breast cancer s/p lumpectomy now s/p ex-lap and small bowel resection 2/2 closed loop small bowel obstruction complaining of chest pain and pleurisy  - cards consult 5/13AM  - trend troponins x 2  - chest PT, aggressive IS, duonebs  - f/u b/l LE duplex  - restarted home cardiology medications  - Lopressor 5 mg -> lopressor 25 mg (home dose), cardizem 120mg qd, lisinopril 2.5mg qd.

## 2019-05-13 NOTE — DISCHARGE NOTE PROVIDER - HOSPITAL COURSE
83 year old female presents to ED with daughter complaining of worsening abdominal pain for the past day. Pain is located in middle of abdomen, nonradiating and sharp in nature. Ate small amount of food this morning but has been extremely nauseous without episodes of emesis. Last passed flatus last night and had a few small bowel movements this morning. Never experienced abdominal pain like this before 83 year old female presents to ED with daughter complaining of worsening abdominal pain for the past day. Pain is located in middle of abdomen, nonradiating and sharp in nature. Ate small amount of food this morning but has been extremely nauseous without episodes of emesis. Last passed flatus last night and had a few small bowel movements this morning. Never experienced abdominal pain like this before.        CT A/P with closed loop obstruction.         Pt taken to OR on 5/12 for exlap, Small bowel resection.   Pt noted to have small bowel mass identified in distal ileum, resected with 8cm     margin on either side, primary side to side anastomosis, small bowel ran in its entirety and no other pathology identified.            Pt had LBBBon EKG 5/13, cards consulted.  Cards consult suggests new LBBB since 2018.  Pt had experienced CP post op    once NGT was removed, with same LBBB and no trop leak.  ECHO performed without regional wall motion abnormalities.     Pt taken for cardiac cath on 5/13 for unstable angina .        Cath report:  s/p LHC RFA w/mynx    Verbal report:  RCA and patent LM/LAD stents    distal aorta 60%        Pt stable for d/c home per cards with resuming all home meds and outpt follow up as needed.        Pt was able to tolerate advancement to regular diet, pain well controlled on PO pain meds, voids.  Stable for d/c home toda.y 83 year old female presents to ED with daughter complaining of worsening abdominal pain for the past day. Pain is located in middle of abdomen, nonradiating and sharp in nature. Ate small amount of food this morning but has been extremely nauseous without episodes of emesis. Last passed flatus last night and had a few small bowel movements this morning. Never experienced abdominal pain like this before.        CT A/P with closed loop obstruction.         Pt taken to OR on 5/12 for exlap, Small bowel resection.   Pt noted to have small bowel mass identified in distal ileum, resected with 8cm     margin on either side, primary side to side anastomosis, small bowel ran in its entirety and no other pathology identified.            Pt had LBBBon EKG 5/13, cards consulted.  Cards consult suggests new LBBB since 2018.  Pt had experienced CP post op    once NGT was removed, with same LBBB and no trop leak.  ECHO performed without regional wall motion abnormalities.     Pt taken for cardiac cath on 5/13 for unstable angina .        Cath report:  s/p LHC RFA w/mynx    Verbal report:  RCA and patent LM/LAD stents    distal aorta 60%        Pt stable for d/c home per cards with resuming all home meds and outpt follow up as needed. Eval by PT recs for d/c home.          Pt was able to tolerate advancement to regular diet, pain well controlled on PO pain meds, voids.  Stable for d/c home toda.y

## 2019-05-13 NOTE — DISCHARGE NOTE PROVIDER - NSDCCPCAREPLAN_GEN_ALL_CORE_FT
PRINCIPAL DISCHARGE DIAGNOSIS  Diagnosis: Small bowel obstruction  Assessment and Plan of Treatment:   BATHING: Please do not submerge wound underwater. You may shower and/or sponge bathe.   ACTIVITY: No heavy lifting or straining. Otherwise, you may return to your usual level of physical activity. If you are taking narcotic pain medication (such as Percocet) DO NOT drive a car, operate machinery or make important decisions.  DIET: Return to your usual diet.  NOTIFY YOUR SURGEON IF: You have any bleeding that does not stop, any pus draining from your wound(s), any fever (over 100.4 F) or chills, persistent nausea/vomiting, persistent diarrhea, or if your pain is not controlled on your discharge pain medications.  FOLLOW-UP: Please follow up with your primary care physician and Acute Care Surgery clinic [phone] in 10-14 days regarding your hospitalization. Call for appointment upon discharge.

## 2019-05-14 DIAGNOSIS — Z98.890 OTHER SPECIFIED POSTPROCEDURAL STATES: ICD-10-CM

## 2019-05-14 LAB
ANION GAP SERPL CALC-SCNC: 12 MMOL/L — SIGNIFICANT CHANGE UP (ref 5–17)
BASOPHILS # BLD AUTO: 0 K/UL — SIGNIFICANT CHANGE UP (ref 0–0.2)
BASOPHILS NFR BLD AUTO: 0.1 % — SIGNIFICANT CHANGE UP (ref 0–2)
BUN SERPL-MCNC: 16 MG/DL — SIGNIFICANT CHANGE UP (ref 8–20)
CALCIUM SERPL-MCNC: 8.5 MG/DL — LOW (ref 8.6–10.2)
CHLORIDE SERPL-SCNC: 105 MMOL/L — SIGNIFICANT CHANGE UP (ref 98–107)
CO2 SERPL-SCNC: 23 MMOL/L — SIGNIFICANT CHANGE UP (ref 22–29)
CREAT SERPL-MCNC: 0.68 MG/DL — SIGNIFICANT CHANGE UP (ref 0.5–1.3)
EOSINOPHIL # BLD AUTO: 0.2 K/UL — SIGNIFICANT CHANGE UP (ref 0–0.5)
EOSINOPHIL NFR BLD AUTO: 1.9 % — SIGNIFICANT CHANGE UP (ref 0–6)
GLUCOSE SERPL-MCNC: 94 MG/DL — SIGNIFICANT CHANGE UP (ref 70–115)
HCT VFR BLD CALC: 24.9 % — LOW (ref 37–47)
HGB BLD-MCNC: 8.1 G/DL — LOW (ref 12–16)
LYMPHOCYTES # BLD AUTO: 1.6 K/UL — SIGNIFICANT CHANGE UP (ref 1–4.8)
LYMPHOCYTES # BLD AUTO: 19.3 % — LOW (ref 20–55)
MAGNESIUM SERPL-MCNC: 2.1 MG/DL — SIGNIFICANT CHANGE UP (ref 1.8–2.6)
MCHC RBC-ENTMCNC: 28 PG — SIGNIFICANT CHANGE UP (ref 27–31)
MCHC RBC-ENTMCNC: 32.5 G/DL — SIGNIFICANT CHANGE UP (ref 32–36)
MCV RBC AUTO: 86.2 FL — SIGNIFICANT CHANGE UP (ref 81–99)
MONOCYTES # BLD AUTO: 0.7 K/UL — SIGNIFICANT CHANGE UP (ref 0–0.8)
MONOCYTES NFR BLD AUTO: 8.6 % — SIGNIFICANT CHANGE UP (ref 3–10)
NEUTROPHILS # BLD AUTO: 5.9 K/UL — SIGNIFICANT CHANGE UP (ref 1.8–8)
NEUTROPHILS NFR BLD AUTO: 69.7 % — SIGNIFICANT CHANGE UP (ref 37–73)
PHOSPHATE SERPL-MCNC: 3 MG/DL — SIGNIFICANT CHANGE UP (ref 2.4–4.7)
PLATELET # BLD AUTO: 263 K/UL — SIGNIFICANT CHANGE UP (ref 150–400)
POTASSIUM SERPL-MCNC: 4.5 MMOL/L — SIGNIFICANT CHANGE UP (ref 3.5–5.3)
POTASSIUM SERPL-SCNC: 4.5 MMOL/L — SIGNIFICANT CHANGE UP (ref 3.5–5.3)
RBC # BLD: 2.89 M/UL — LOW (ref 4.4–5.2)
RBC # FLD: 14.8 % — SIGNIFICANT CHANGE UP (ref 11–15.6)
SODIUM SERPL-SCNC: 140 MMOL/L — SIGNIFICANT CHANGE UP (ref 135–145)
WBC # BLD: 8.5 K/UL — SIGNIFICANT CHANGE UP (ref 4.8–10.8)
WBC # FLD AUTO: 8.5 K/UL — SIGNIFICANT CHANGE UP (ref 4.8–10.8)

## 2019-05-14 PROCEDURE — 99024 POSTOP FOLLOW-UP VISIT: CPT

## 2019-05-14 PROCEDURE — 99233 SBSQ HOSP IP/OBS HIGH 50: CPT

## 2019-05-14 RX ORDER — ASPIRIN/CALCIUM CARB/MAGNESIUM 324 MG
81 TABLET ORAL DAILY
Refills: 0 | Status: DISCONTINUED | OUTPATIENT
Start: 2019-05-14 | End: 2019-05-16

## 2019-05-14 RX ORDER — TRAMADOL HYDROCHLORIDE 50 MG/1
25 TABLET ORAL EVERY 4 HOURS
Refills: 0 | Status: DISCONTINUED | OUTPATIENT
Start: 2019-05-14 | End: 2019-05-16

## 2019-05-14 RX ADMIN — Medication 650 MILLIGRAM(S): at 04:16

## 2019-05-14 RX ADMIN — Medication 650 MILLIGRAM(S): at 23:48

## 2019-05-14 RX ADMIN — Medication 3 MILLILITER(S): at 15:49

## 2019-05-14 RX ADMIN — PANTOPRAZOLE SODIUM 40 MILLIGRAM(S): 20 TABLET, DELAYED RELEASE ORAL at 10:41

## 2019-05-14 RX ADMIN — Medication 5 MILLIGRAM(S): at 23:48

## 2019-05-14 RX ADMIN — Medication 81 MILLIGRAM(S): at 21:27

## 2019-05-14 RX ADMIN — Medication 650 MILLIGRAM(S): at 03:16

## 2019-05-14 RX ADMIN — SODIUM CHLORIDE 90 MILLILITER(S): 9 INJECTION, SOLUTION INTRAVENOUS at 02:51

## 2019-05-14 RX ADMIN — Medication 650 MILLIGRAM(S): at 10:40

## 2019-05-14 RX ADMIN — ENOXAPARIN SODIUM 40 MILLIGRAM(S): 100 INJECTION SUBCUTANEOUS at 21:27

## 2019-05-14 RX ADMIN — ATORVASTATIN CALCIUM 20 MILLIGRAM(S): 80 TABLET, FILM COATED ORAL at 21:27

## 2019-05-14 RX ADMIN — Medication 650 MILLIGRAM(S): at 11:35

## 2019-05-14 RX ADMIN — Medication 3 MILLILITER(S): at 19:57

## 2019-05-14 NOTE — PROGRESS NOTE ADULT - ASSESSMENT
83F h/o CAD s/p CABG x 3, breast cancer s/p lumpectomy now s/p ex-lap and small bowel resection 2/2 closed loop small bowel obstruction; had card cath yesterday which was negative; cp resolved    - cards cleared pt for discharge; d/c telemetry  - chest PT, aggressive IS, duonebs  - restarted home medications; can resume plavix as per cards  - dispo planning as soon as pt can tolerate diet and have BMs; will consider advancing to clears today

## 2019-05-14 NOTE — PROGRESS NOTE ADULT - SUBJECTIVE AND OBJECTIVE BOX
[facility] Practice                                                        Office: [address] [address]                                                       Telephone: [phone]. Fax:[phone]      CARDIOLOGY PROGRESS NOTE   ([facility])    Subjective: Patient seen and examined.  Cardiac cath yesterday - bypasses patent. Old,  of RCA and Lcx.  QRS complex back to baseline.  No chest pain, no SOB.     ROS: No headache, no chest pain, no SOB, no palpitations, no dizziness, no nausea, no bleeding    Chronic Conditions:     CURRENT MEDICATIONS  diltiazem    milliGRAM(s) Oral daily  lisinopril 2.5 milliGRAM(s) Oral daily  metoprolol succinate ER 25 milliGRAM(s) Oral daily  metoprolol tartrate Injectable 5 milliGRAM(s) IV Push every 6 hours      ALBUTerol    90 MICROgram(s) HFA Inhaler 1 Puff(s) Inhalation every 4 hours  ALBUTerol/ipratropium for Nebulization 3 milliLiter(s) Nebulizer every 6 hours  tiotropium 18 MICROgram(s) Capsule 1 Capsule(s) Inhalation daily    acetaminophen   Tablet ..      acetaminophen   Tablet .. 650 milliGRAM(s) Oral every 6 hours  ondansetron Injectable 4 milliGRAM(s) IV Push every 6 hours PRN    pantoprazole  Injectable 40 milliGRAM(s) IV Push daily    atorvastatin 20 milliGRAM(s) Oral at bedtime    enoxaparin Injectable 40 milliGRAM(s) SubCutaneous daily  lactated ringers. 1000 milliLiter(s) IV Continuous <Continuous>    	  TELEMETRY: SR, PVCs  Vitals:  T(C): 36.6 (05-14-19 @ 05:19), Max: 36.8 (05-13-19 @ 11:30)  HR: 58 (05-14-19 @ 05:19) (52 - 93)  BP: 112/40 (05-14-19 @ 05:19) (105/54 - 156/57)  RR: 18 (05-14-19 @ 05:19) (16 - 19)  SpO2: 99% (05-14-19 @ 05:19) (91% - 100%)  Wt(kg): --  I&O's Summary    13 May 2019 07:01  -  14 May 2019 07:00  --------------------------------------------------------  IN: 1550 mL / OUT: 400 mL / NET: 1150 mL    14 May 2019 07:01  -  14 May 2019 08:59  --------------------------------------------------------  IN: 180 mL / OUT: 0 mL / NET: 180 mL        Daily T(C): 36.6 (05-14-19 @ 05:19), Max: 36.8 (05-13-19 @ 11:30)  HR: 58 (05-14-19 @ 05:19) (52 - 93)  BP: 112/40 (05-14-19 @ 05:19) (105/54 - 156/57)  RR: 18 (05-14-19 @ 05:19) (16 - 19)  SpO2: 99% (05-14-19 @ 05:19) (91% - 100%)  Wt(kg): --    Daily     PHYSICAL EXAM:  Appearance: Normal	  HEENT:   Normal oral mucosa, PERRL, EOMI	  Lymphatic: No lymphadenopathy  Cardiovascular: Normal S1 S2, No JVD, II/VI systolic murmur, No edema  Respiratory: Lungs clear to auscultation	  Psychiatry: A & O x 3, Mood & affect appropriate  Gastrointestinal:  Soft, non-tender; surgical site with bandage  Skin: No rashes, No ecchymoses, No cyanosis  Neurologic: Non-focal  Extremities: Normal range of motion, No clubbing, cyanosis or edema  Vascular: Peripheral pulses palpable 2+ bilaterally, warm; right groin - no hematoma, non-tender, no bruit      ECG (tracing reviewed by me):  	    DIAGNOSTIC TESTING:  Echocardiogram (images reviewed by me): < from: TTE Echo Complete w/Doppler (05.13.19 @ 09:44) >  Summary:   1. Left ventricular ejection fraction, by visual estimation, is 55 to   60%.   2. Normal global left ventricular systolic function.   3. Spectral Doppler shows impaired relaxation pattern of left   ventricular myocardial filling (Grade I diastolic dysfunction).   4. Normal left ventricular internal cavity size.   5. Normal left atrial size.   6. Trace mitral valve regurgitation.   7. Mild to moderate pulmonic valve regurgitation.   8. There is no evidence of pericardial effusion.    MD Efra Electronically signed on 5/13/2019 at 4:36:10 PM    < end of copied text >    Catheterization: 5/13/19 - discussed with Dr. Edwards (interventionalist) - patent grafts.  old chronic total occlusions of RCA and LCx.  No new obstructive CAD.  Post procedure LBBB resolved.   Stress Test:    CXR (image reviewed by me):  OTHER: 	    LABS:	 	                          8.1    8.5   )-----------( 263      ( 14 May 2019 05:44 )             24.9     05-14    140  |  105  |  16.0  ----------------------------<  94  4.5   |  23.0  |  0.68    Ca    8.5<L>      14 May 2019 05:44  Phos  3.0     05-14  Mg     2.1     05-14      BNP:   Lipid Profile:   HgA1c:   TSH: CC:  Patient is a 83y old  Female who presents with a chief complaint of closed loop small bowel obstruction (14 May 2019 12:38)    HPI:  83 year old female presents to ED with daughter complaining of worsening abdominal pain for the past day. Pain is located in middle of abdomen, nonradiating and sharp in nature. Ate small amount of food this morning but has been extremely nauseous without episodes of emesis. Last passed flatus last night and had a few small bowel movements this morning. Never experienced abdominal pain like this before (12 May 2019 00:37)      ROS: All review of systems negative unless indicated otherwise below.     Lab Results:  CBC  CBC Full  -  ( 14 May 2019 05:44 )  WBC Count : 8.5 K/uL  RBC Count : 2.89 M/uL  Hemoglobin : 8.1 g/dL  Hematocrit : 24.9 %  Platelet Count - Automated : 263 K/uL  Mean Cell Volume : 86.2 fl  Mean Cell Hemoglobin : 28.0 pg  Mean Cell Hemoglobin Concentration : 32.5 g/dL  Auto Neutrophil # : 5.9 K/uL  Auto Lymphocyte # : 1.6 K/uL  Auto Monocyte # : 0.7 K/uL  Auto Eosinophil # : 0.2 K/uL  Auto Basophil # : 0.0 K/uL  Auto Neutrophil % : 69.7 %  Auto Lymphocyte % : 19.3 %  Auto Monocyte % : 8.6 %  Auto Eosinophil % : 1.9 %  Auto Basophil % : 0.1 %    .		Differential:	[] Automated		[] Manual  Chemistry                        8.1    8.5   )-----------( 263      ( 14 May 2019 05:44 )             24.9     05-14    140  |  105  |  16.0  ----------------------------<  94  4.5   |  23.0  |  0.68    Ca    8.5<L>      14 May 2019 05:44  Phos  3.0     05-14  Mg     2.1     05-14    ABG - ( 12 May 2019 23:21 )  pH, Arterial: 7.40  pH, Blood: x     /  pCO2: 35    /  pO2: 63    / HCO3: 22    / Base Excess: -2.7  /  SaO2: 93          CARDIAC MARKERS ( 13 May 2019 16:49 )  x     / <0.01 ng/mL / 90 U/L / x     / x      CARDIAC MARKERS ( 12 May 2019 23:32 )  x     / <0.01 ng/mL / x     / x     / x        CATH REPORT:   Verbal report:  RCA and patent LM/LAD stents  distal aorta 60%  Tolerated procedure well w/o complications  Seen post procedure by Dr. Edwards    MEDICATIONS  (STANDING):  acetaminophen   Tablet ..      acetaminophen   Tablet .. 650 milliGRAM(s) Oral every 6 hours  ALBUTerol    90 MICROgram(s) HFA Inhaler 1 Puff(s) Inhalation every 4 hours  ALBUTerol/ipratropium for Nebulization 3 milliLiter(s) Nebulizer every 6 hours  atorvastatin 20 milliGRAM(s) Oral at bedtime  diltiazem    milliGRAM(s) Oral daily  enoxaparin Injectable 40 milliGRAM(s) SubCutaneous daily  lactated ringers. 1000 milliLiter(s) IV Continuous <Continuous>  lisinopril 2.5 milliGRAM(s) Oral daily  metoprolol succinate ER 25 milliGRAM(s) Oral daily  metoprolol tartrate Injectable 5 milliGRAM(s) IV Push every 6 hours  pantoprazole  Injectable 40 milliGRAM(s) IV Push daily  tiotropium 18 MICROgram(s) Capsule 1 Capsule(s) Inhalation daily    MEDICATIONS  (PRN):  ondansetron Injectable 4 milliGRAM(s) IV Push every 6 hours PRN Nausea and/or Vomiting    PHYSICAL EXAM:  Vital Signs Last 24 Hrs  T(C): 36.7 (14 May 2019 10:44), Max: 36.7 (13 May 2019 21:48)  T(F): 98 (14 May 2019 10:44), Max: 98.1 (13 May 2019 21:48)  HR: 62 (14 May 2019 10:44) (52 - 62)  BP: 141/59 (14 May 2019 10:44) (105/54 - 156/57)  BP(mean): --  RR: 18 (14 May 2019 10:44) (16 - 18)  SpO2: 98% (14 May 2019 10:44) (91% - 100%)    GENERAL: NAD, well-groomed, well-developed  HEAD:  Atraumatic, Normocephalic  NECK: Supple, No JVD  NERVOUS SYSTEM:  Alert & Oriented X3, Good concentration; Motor Strength 5/5 B/L upper and lower extremities, sensation intact  CHEST/LUNG: Clear to auscultation bilaterally, No rales, rhonchi, wheezing, or rubs  HEART: Regular rate and rhythm; s1 and s2 auscultated, No murmurs, rubs, or gallops  ABDOMEN: Soft, Nontender, Nondistended; Bowel sounds present and normoactive.   EXTREMITIES:  2+ Peripheral Pulses, No clubbing, cyanosis, or edema  SKIN: No rashes or lesions  CATH SITE: Right groin benign s/p cath.  No bleeding, no ecchymosis, no hematoma. Extremity Warm to touch, with palpable distal pulses, and brisk capillary refill.

## 2019-05-14 NOTE — PROGRESS NOTE ADULT - ASSESSMENT
84 y/o with hx of CAD s/p CABG x3 in 2011, hypertension, hyperlipidemia, moderate carotid atherosclerosis, breast cancer,  who initially presented 3 days ago with abdominal pain, nausea and vomiting.  Noted to have a small bowel obstruction.  She underwent ex lap, TERESA, small bowel resection and anastamosis and subsequently found with a mass in the distal ileum.  She normally resides in [place] and comes annually to visit her doctors [place] the [place].  For the past 2 weeks has been having fatigue, and mild chest pain.  She went to her doctors in [place] who noted a change in her ECG and advised her for further testing, which prompted her to come back to the [place].  On admission had a LBBB which is new from 2018.  Last night had acute onset severe chest pressure after OGT was removed.  ECG performed at the time showed LBBB but did also have 2-3 mm IVONE anteroseptally.  Troponins overnight were negative.  Currently she is chest pain free.   Interviewed with the aid of  via [facility]    ECG (tracing reviewed by me): SR, LBBB, dynamic IVONE with chest pain, immediately repeated with resolution of IVONE  Echo reviewed by me while it was being performed - Normal biventricular systolic function.  Estimated LVEF 55-60%, no severe valvular disease, no obvious regional wall motion abnormalities.   cath - patent grafts, no new obstructive CAD    # chest pain syndrome - unclear etiology; no new obstructive CAD  # POD 2 for SBO - exlap/TERESA/SB resection  # ileum mass   # transient LBBB in absence of new obstructive CAD, primarily conduction issue.  No further testing necessary  # known CAD - stable    resume ASA/Plavix  statin  metoprolol   await pathology results of ileum mass  may d/c telemetry  discharge planning per surgical team  Will follow  D/W [employer] NP, daughter Nidhi (via phone), and patient

## 2019-05-14 NOTE — PROGRESS NOTE ADULT - PROBLEM SELECTOR PLAN 1
Statin added  Rt groin care w/instructions to pt  [facility] [place] [facility] outpt
No new obstructive CAD   POD # 2 for SBO - exlap, TERESA, SB resection. Ileum mass.   Awaiting call from trauma surgery PA to resume ASA/Plavix  continue statin, metoprolol.   DC telemetry.   Dc planning per surgery.     Patient educated about groin site care, signs and symptoms of complication post procedure, and plan of care moving forward. Patient verbalized understanding with teach-back.

## 2019-05-14 NOTE — PROGRESS NOTE ADULT - SUBJECTIVE AND OBJECTIVE BOX
[facility] Practice                                                        Office: [address] [address]                                                       Telephone: [phone]. Fax:[phone]      CARDIOLOGY PROGRESS NOTE   ([facility])    Subjective: Patient seen and examined.  Cardiac cath yesterday - bypasses patent. Old,  of RCA and Lcx.  QRS complex back to baseline.  No chest pain, no SOB.     ROS: No headache, no chest pain, no SOB, no palpitations, no dizziness, no nausea, no bleeding    Chronic Conditions:     CURRENT MEDICATIONS  diltiazem    milliGRAM(s) Oral daily  lisinopril 2.5 milliGRAM(s) Oral daily  metoprolol succinate ER 25 milliGRAM(s) Oral daily  metoprolol tartrate Injectable 5 milliGRAM(s) IV Push every 6 hours      ALBUTerol    90 MICROgram(s) HFA Inhaler 1 Puff(s) Inhalation every 4 hours  ALBUTerol/ipratropium for Nebulization 3 milliLiter(s) Nebulizer every 6 hours  tiotropium 18 MICROgram(s) Capsule 1 Capsule(s) Inhalation daily    acetaminophen   Tablet ..      acetaminophen   Tablet .. 650 milliGRAM(s) Oral every 6 hours  ondansetron Injectable 4 milliGRAM(s) IV Push every 6 hours PRN    pantoprazole  Injectable 40 milliGRAM(s) IV Push daily    atorvastatin 20 milliGRAM(s) Oral at bedtime    enoxaparin Injectable 40 milliGRAM(s) SubCutaneous daily  lactated ringers. 1000 milliLiter(s) IV Continuous <Continuous>    	  TELEMETRY: SR, PVCs  Vitals:  T(C): 36.6 (05-14-19 @ 05:19), Max: 36.8 (05-13-19 @ 11:30)  HR: 58 (05-14-19 @ 05:19) (52 - 93)  BP: 112/40 (05-14-19 @ 05:19) (105/54 - 156/57)  RR: 18 (05-14-19 @ 05:19) (16 - 19)  SpO2: 99% (05-14-19 @ 05:19) (91% - 100%)  Wt(kg): --  I&O's Summary    13 May 2019 07:01  -  14 May 2019 07:00  --------------------------------------------------------  IN: 1550 mL / OUT: 400 mL / NET: 1150 mL    14 May 2019 07:01  -  14 May 2019 08:59  --------------------------------------------------------  IN: 180 mL / OUT: 0 mL / NET: 180 mL        Daily T(C): 36.6 (05-14-19 @ 05:19), Max: 36.8 (05-13-19 @ 11:30)  HR: 58 (05-14-19 @ 05:19) (52 - 93)  BP: 112/40 (05-14-19 @ 05:19) (105/54 - 156/57)  RR: 18 (05-14-19 @ 05:19) (16 - 19)  SpO2: 99% (05-14-19 @ 05:19) (91% - 100%)  Wt(kg): --    Daily     PHYSICAL EXAM:  Appearance: Normal	  HEENT:   Normal oral mucosa, PERRL, EOMI	  Lymphatic: No lymphadenopathy  Cardiovascular: Normal S1 S2, No JVD, II/VI systolic murmur, No edema  Respiratory: Lungs clear to auscultation	  Psychiatry: A & O x 3, Mood & affect appropriate  Gastrointestinal:  Soft, non-tender; surgical site   Skin: No rashes, No ecchymoses, No cyanosis  Neurologic: Non-focal  Extremities: Normal range of motion, No clubbing, cyanosis or edema  Vascular: Peripheral pulses palpable 2+ bilaterally, warm      ECG (tracing reviewed by me):  	    DIAGNOSTIC TESTING:  Echocardiogram (images reviewed by me):  Catheterization:  Stress Test:    CXR (image reviewed by me):  OTHER: 	    LABS:	 	  CARDIAC MARKERS:                                  8.1    8.5   )-----------( 263      ( 14 May 2019 05:44 )             24.9     05-14    140  |  105  |  16.0  ----------------------------<  94  4.5   |  23.0  |  0.68    Ca    8.5<L>      14 May 2019 05:44  Phos  3.0     05-14  Mg     2.1     05-14      BNP:   Lipid Profile:   HgA1c:   TSH: [facility]                                                        Office: [address] [address]                                                       Telephone: [phone]. Fax:[phone]      CARDIOLOGY PROGRESS NOTE   ([facility])    Subjective: Patient seen and examined.  Cardiac cath yesterday - bypasses patent. Old,  of RCA and Lcx.  QRS complex back to baseline.  No chest pain, no SOB.     ROS: No headache, no chest pain, no SOB, no palpitations, no dizziness, no nausea, no bleeding    Chronic Conditions:     CURRENT MEDICATIONS  diltiazem    milliGRAM(s) Oral daily  lisinopril 2.5 milliGRAM(s) Oral daily  metoprolol succinate ER 25 milliGRAM(s) Oral daily  metoprolol tartrate Injectable 5 milliGRAM(s) IV Push every 6 hours      ALBUTerol    90 MICROgram(s) HFA Inhaler 1 Puff(s) Inhalation every 4 hours  ALBUTerol/ipratropium for Nebulization 3 milliLiter(s) Nebulizer every 6 hours  tiotropium 18 MICROgram(s) Capsule 1 Capsule(s) Inhalation daily    acetaminophen   Tablet ..      acetaminophen   Tablet .. 650 milliGRAM(s) Oral every 6 hours  ondansetron Injectable 4 milliGRAM(s) IV Push every 6 hours PRN    pantoprazole  Injectable 40 milliGRAM(s) IV Push daily    atorvastatin 20 milliGRAM(s) Oral at bedtime    enoxaparin Injectable 40 milliGRAM(s) SubCutaneous daily  lactated ringers. 1000 milliLiter(s) IV Continuous <Continuous>    	  TELEMETRY: SR, PVCs  Vitals:  T(C): 36.6 (05-14-19 @ 05:19), Max: 36.8 (05-13-19 @ 11:30)  HR: 58 (05-14-19 @ 05:19) (52 - 93)  BP: 112/40 (05-14-19 @ 05:19) (105/54 - 156/57)  RR: 18 (05-14-19 @ 05:19) (16 - 19)  SpO2: 99% (05-14-19 @ 05:19) (91% - 100%)  Wt(kg): --  I&O's Summary    13 May 2019 07:01  -  14 May 2019 07:00  --------------------------------------------------------  IN: 1550 mL / OUT: 400 mL / NET: 1150 mL    14 May 2019 07:01  -  14 May 2019 08:59  --------------------------------------------------------  IN: 180 mL / OUT: 0 mL / NET: 180 mL        Daily T(C): 36.6 (05-14-19 @ 05:19), Max: 36.8 (05-13-19 @ 11:30)  HR: 58 (05-14-19 @ 05:19) (52 - 93)  BP: 112/40 (05-14-19 @ 05:19) (105/54 - 156/57)  RR: 18 (05-14-19 @ 05:19) (16 - 19)  SpO2: 99% (05-14-19 @ 05:19) (91% - 100%)  Wt(kg): --    Daily     PHYSICAL EXAM:  Appearance: Normal	  HEENT:   Normal oral mucosa, PERRL, EOMI	  Lymphatic: No lymphadenopathy  Cardiovascular: Normal S1 S2, No JVD, II/VI systolic murmur, No edema  Respiratory: Lungs clear to auscultation	  Psychiatry: A & O x 3, Mood & affect appropriate  Gastrointestinal:  Soft, non-tender; surgical site with bandage  Skin: No rashes, No ecchymoses, No cyanosis  Neurologic: Non-focal  Extremities: Normal range of motion, No clubbing, cyanosis or edema  Vascular: Peripheral pulses palpable 2+ bilaterally, warm; right groin - no hematoma, non-tender, no bruit      ECG (tracing reviewed by me):  	    DIAGNOSTIC TESTING:  Echocardiogram (images reviewed by me): < from: TTE Echo Complete w/Doppler (05.13.19 @ 09:44) >  Summary:   1. Left ventricular ejection fraction, by visual estimation, is 55 to   60%.   2. Normal global left ventricular systolic function.   3. Spectral Doppler shows impaired relaxation pattern of left   ventricular myocardial filling (Grade I diastolic dysfunction).   4. Normal left ventricular internal cavity size.   5. Normal left atrial size.   6. Trace mitral valve regurgitation.   7. Mild to moderate pulmonic valve regurgitation.   8. There is no evidence of pericardial effusion.    MD Efra Electronically signed on 5/13/2019 at 4:36:10 PM    < end of copied text >    Catheterization: 5/13/19 - discussed with Dr. Edwards (interventionalist) - patent grafts.  old chronic total occlusions of RCA and LCx.  No new obstructive CAD.  Post procedure LBBB resolved.   Stress Test:    CXR (image reviewed by me):  OTHER: 	    LABS:	 	  CARDIAC MARKERS:                                  8.1    8.5   )-----------( 263      ( 14 May 2019 05:44 )             24.9     05-14    140  |  105  |  16.0  ----------------------------<  94  4.5   |  23.0  |  0.68    Ca    8.5<L>      14 May 2019 05:44  Phos  3.0     05-14  Mg     2.1     05-14      BNP:   Lipid Profile:   HgA1c:   TSH:

## 2019-05-14 NOTE — PROGRESS NOTE ADULT - SUBJECTIVE AND OBJECTIVE BOX
INTERVAL HPI/OVERNIGHT EVENTS:    SUBJECTIVE:  s/p ex lap, SBR POD 2, and s/p card cath POD1 2/2 ST elevation, which self-resolved.  Card cath was negative.  ANJUM overnight.  Pt having flatus, but no BMs.  On 2L NC.  Tolerating sips and chips.  No cp or sob.  No n/v/f/c.      MEDICATIONS  (STANDING):  acetaminophen   Tablet ..      acetaminophen   Tablet .. 650 milliGRAM(s) Oral every 6 hours  ALBUTerol    90 MICROgram(s) HFA Inhaler 1 Puff(s) Inhalation every 4 hours  ALBUTerol/ipratropium for Nebulization 3 milliLiter(s) Nebulizer every 6 hours  atorvastatin 20 milliGRAM(s) Oral at bedtime  diltiazem    milliGRAM(s) Oral daily  enoxaparin Injectable 40 milliGRAM(s) SubCutaneous daily  lactated ringers. 1000 milliLiter(s) (90 mL/Hr) IV Continuous <Continuous>  lisinopril 2.5 milliGRAM(s) Oral daily  metoprolol succinate ER 25 milliGRAM(s) Oral daily  metoprolol tartrate Injectable 5 milliGRAM(s) IV Push every 6 hours  pantoprazole  Injectable 40 milliGRAM(s) IV Push daily  tiotropium 18 MICROgram(s) Capsule 1 Capsule(s) Inhalation daily    MEDICATIONS  (PRN):  ondansetron Injectable 4 milliGRAM(s) IV Push every 6 hours PRN Nausea and/or Vomiting      Vital Signs Last 24 Hrs  T(C): 36.7 (14 May 2019 10:44), Max: 36.8 (13 May 2019 11:30)  T(F): 98 (14 May 2019 10:44), Max: 98.2 (13 May 2019 11:30)  HR: 62 (14 May 2019 10:44) (52 - 93)  BP: 141/59 (14 May 2019 10:44) (105/54 - 156/57)  BP(mean): --  RR: 18 (14 May 2019 10:44) (16 - 19)  SpO2: 98% (14 May 2019 10:44) (91% - 100%)    PE  Gen: NAD  Pulm: nonlabored breathing  CV: RRR  Abd: soft, nt, nd; incisions c/d/i  Ext: no cyanosis, clubbing, or edema  Neuro: AAOx3, no sensory or motor deficits      I&O's Detail    13 May 2019 07:01  -  14 May 2019 07:00  --------------------------------------------------------  IN:    lactated ringers.: 1440 mL    Oral Fluid: 110 mL  Total IN: 1550 mL    OUT:    Voided: 400 mL  Total OUT: 400 mL    Total NET: 1150 mL      14 May 2019 07:01  -  14 May 2019 10:45  --------------------------------------------------------  IN:    lactated ringers.: 180 mL  Total IN: 180 mL    OUT:  Total OUT: 0 mL    Total NET: 180 mL          LABS:                        8.1    8.5   )-----------( 263      ( 14 May 2019 05:44 )             24.9     05-14    140  |  105  |  16.0  ----------------------------<  94  4.5   |  23.0  |  0.68    Ca    8.5<L>      14 May 2019 05:44  Phos  3.0     05-14  Mg     2.1     05-14            RADIOLOGY & ADDITIONAL STUDIES:

## 2019-05-14 NOTE — PROGRESS NOTE ADULT - ASSESSMENT
82 y/o with hx of CAD s/p CABG x3 in 2011, hypertension, hyperlipidemia, moderate carotid atherosclerosis, breast cancer,  who initially presented 3 days ago with abdominal pain, nausea and vomiting.  Noted to have a small bowel obstruction.  She underwent ex lap, TERESA, small bowel resection and anastamosis and subsequently found with a mass in the distal ileum.  She normally resides in [facility] and comes annually to visit [place] the [place].  For the past 2 weeks has been having fatigue, and mild chest pain.  She went to her doctors in [facility] who noted a change in her ECG and advised her for further testing, which prompted her to come back [place] the [place].  On admission had a LBBB which is new from 2018.  Last night had acute onset severe chest pressure after OGT was removed.  ECG performed at the time showed LBBB but did also have 2-3 mm IVONE anteroseptally.  Troponins overnight were negative.  Currently she is chest pain free.   Interviewed with the aid of  via [employer]    ECG (tracing reviewed by me): SR, LBBB, dynamic IVONE with chest pain, immediately repeated with resolution of IVONE  Echo reviewed by me while it was being performed - Normal biventricular systolic function.  Estimated LVEF 55-60%, no severe valvular disease, no obvious regional wall motion abnormalities.   cath - patent grafts, no new obstructive CAD    # chest pain syndrome - unclear etiology; no new obstructive CAD  # POD 2 for SBO - exlap/TERESA/SB resection  # ileum mass   # transient LBBB in absence of new obstructive CAD, primarily conduction issue.  No further testing necessary  # known CAD - stable    resume ASA/Plavix  statin  metoprolol   await pathology results of ileum mass  may d/c telemetry  discharge planning per surgical team  Will follow  D/W [employer] NP, daughter Nidhi (via phone), and patient    Cardiology NP addendum -     Groin assessment:   Groin: Right groin benign s/p cath.  No bleeding, no ecchymosis, no hematoma. Extremity Warm to touch, with palpable distal pulses, and brisk capillary refill.   Patient educated about groin site care, signs and symptoms of complication post procedure, and plan of care moving forward. Patient verbalized understanding with teach-back. 82 y/o with hx of CAD s/p CABG x3 in 2011, hypertension, hyperlipidemia, moderate carotid atherosclerosis, breast cancer,  who initially presented 3 days ago with abdominal pain, nausea and vomiting.  Noted to have a small bowel obstruction.  She underwent ex lap, TERESA, small bowel resection and anastamosis and subsequently found with a mass in the distal ileum.  She normally resides in [facility] and comes annually to visit her doctors [place].  For the past 2 weeks has been having fatigue, and mild chest pain.  She went to her doctors in [facility] who noted a change in her ECG and advised her for further testing, which prompted her to come back [place] the [place].  On admission had a LBBB which is new from 2018.  Last night had acute onset severe chest pressure after OGT was removed.  ECG performed at the time showed LBBB but did also have 2-3 mm IVONE anteroseptally.  Troponins overnight were negative.  Currently she is chest pain free.   Interviewed with the aid of  via [employer]

## 2019-05-15 LAB
ANION GAP SERPL CALC-SCNC: 12 MMOL/L — SIGNIFICANT CHANGE UP (ref 5–17)
BASOPHILS # BLD AUTO: 0 K/UL — SIGNIFICANT CHANGE UP (ref 0–0.2)
BASOPHILS NFR BLD AUTO: 0.4 % — SIGNIFICANT CHANGE UP (ref 0–2)
BUN SERPL-MCNC: 12 MG/DL — SIGNIFICANT CHANGE UP (ref 8–20)
CALCIUM SERPL-MCNC: 8.4 MG/DL — LOW (ref 8.6–10.2)
CHLORIDE SERPL-SCNC: 104 MMOL/L — SIGNIFICANT CHANGE UP (ref 98–107)
CO2 SERPL-SCNC: 25 MMOL/L — SIGNIFICANT CHANGE UP (ref 22–29)
CREAT SERPL-MCNC: 0.76 MG/DL — SIGNIFICANT CHANGE UP (ref 0.5–1.3)
EOSINOPHIL # BLD AUTO: 0.4 K/UL — SIGNIFICANT CHANGE UP (ref 0–0.5)
EOSINOPHIL NFR BLD AUTO: 5.5 % — SIGNIFICANT CHANGE UP (ref 0–6)
GLUCOSE SERPL-MCNC: 85 MG/DL — SIGNIFICANT CHANGE UP (ref 70–115)
HCT VFR BLD CALC: 25.8 % — LOW (ref 37–47)
HGB BLD-MCNC: 8.3 G/DL — LOW (ref 12–16)
LYMPHOCYTES # BLD AUTO: 2.1 K/UL — SIGNIFICANT CHANGE UP (ref 1–4.8)
LYMPHOCYTES # BLD AUTO: 29.7 % — SIGNIFICANT CHANGE UP (ref 20–55)
MAGNESIUM SERPL-MCNC: 1.8 MG/DL — SIGNIFICANT CHANGE UP (ref 1.6–2.6)
MCHC RBC-ENTMCNC: 27.8 PG — SIGNIFICANT CHANGE UP (ref 27–31)
MCHC RBC-ENTMCNC: 32.2 G/DL — SIGNIFICANT CHANGE UP (ref 32–36)
MCV RBC AUTO: 86.3 FL — SIGNIFICANT CHANGE UP (ref 81–99)
MONOCYTES # BLD AUTO: 0.7 K/UL — SIGNIFICANT CHANGE UP (ref 0–0.8)
MONOCYTES NFR BLD AUTO: 9.9 % — SIGNIFICANT CHANGE UP (ref 3–10)
NEUTROPHILS # BLD AUTO: 3.8 K/UL — SIGNIFICANT CHANGE UP (ref 1.8–8)
NEUTROPHILS NFR BLD AUTO: 53.9 % — SIGNIFICANT CHANGE UP (ref 37–73)
PLATELET # BLD AUTO: 282 K/UL — SIGNIFICANT CHANGE UP (ref 150–400)
POTASSIUM SERPL-MCNC: 4.1 MMOL/L — SIGNIFICANT CHANGE UP (ref 3.5–5.3)
POTASSIUM SERPL-SCNC: 4.1 MMOL/L — SIGNIFICANT CHANGE UP (ref 3.5–5.3)
RBC # BLD: 2.99 M/UL — LOW (ref 4.4–5.2)
RBC # FLD: 14.6 % — SIGNIFICANT CHANGE UP (ref 11–15.6)
SODIUM SERPL-SCNC: 141 MMOL/L — SIGNIFICANT CHANGE UP (ref 135–145)
SURGICAL PATHOLOGY STUDY: SIGNIFICANT CHANGE UP
WBC # BLD: 7 K/UL — SIGNIFICANT CHANGE UP (ref 4.8–10.8)
WBC # FLD AUTO: 7 K/UL — SIGNIFICANT CHANGE UP (ref 4.8–10.8)

## 2019-05-15 PROCEDURE — 99232 SBSQ HOSP IP/OBS MODERATE 35: CPT

## 2019-05-15 PROCEDURE — 99024 POSTOP FOLLOW-UP VISIT: CPT

## 2019-05-15 RX ORDER — CLOPIDOGREL BISULFATE 75 MG/1
75 TABLET, FILM COATED ORAL DAILY
Refills: 0 | Status: DISCONTINUED | OUTPATIENT
Start: 2019-05-15 | End: 2019-05-16

## 2019-05-15 RX ADMIN — Medication 650 MILLIGRAM(S): at 05:35

## 2019-05-15 RX ADMIN — Medication 25 MILLIGRAM(S): at 05:36

## 2019-05-15 RX ADMIN — ENOXAPARIN SODIUM 40 MILLIGRAM(S): 100 INJECTION SUBCUTANEOUS at 13:34

## 2019-05-15 RX ADMIN — CLOPIDOGREL BISULFATE 75 MILLIGRAM(S): 75 TABLET, FILM COATED ORAL at 13:37

## 2019-05-15 RX ADMIN — ATORVASTATIN CALCIUM 20 MILLIGRAM(S): 80 TABLET, FILM COATED ORAL at 21:38

## 2019-05-15 RX ADMIN — LISINOPRIL 2.5 MILLIGRAM(S): 2.5 TABLET ORAL at 05:35

## 2019-05-15 RX ADMIN — Medication 120 MILLIGRAM(S): at 05:35

## 2019-05-15 RX ADMIN — Medication 5 MILLIGRAM(S): at 13:21

## 2019-05-15 RX ADMIN — Medication 650 MILLIGRAM(S): at 22:30

## 2019-05-15 RX ADMIN — PANTOPRAZOLE SODIUM 40 MILLIGRAM(S): 20 TABLET, DELAYED RELEASE ORAL at 13:19

## 2019-05-15 RX ADMIN — Medication 3 MILLILITER(S): at 09:16

## 2019-05-15 RX ADMIN — Medication 81 MILLIGRAM(S): at 13:33

## 2019-05-15 RX ADMIN — Medication 5 MILLIGRAM(S): at 05:34

## 2019-05-15 RX ADMIN — Medication 5 MILLIGRAM(S): at 21:37

## 2019-05-15 RX ADMIN — Medication 650 MILLIGRAM(S): at 06:29

## 2019-05-15 RX ADMIN — Medication 650 MILLIGRAM(S): at 00:48

## 2019-05-15 RX ADMIN — SODIUM CHLORIDE 90 MILLILITER(S): 9 INJECTION, SOLUTION INTRAVENOUS at 23:14

## 2019-05-15 RX ADMIN — Medication 650 MILLIGRAM(S): at 21:38

## 2019-05-15 NOTE — PROGRESS NOTE ADULT - SUBJECTIVE AND OBJECTIVE BOX
HPI/OVERNIGHT EVENTS: Patient seen and examined at bedside this AM. No acute events overnight per nursing reports. Patient's well controlled, voiding, passing flatus, no bowel movements. Denies fever, chills, nausea, vomitting, chest pain, SOB, dizziness, abd pain or any other concerning symptoms    Vital Signs Last 24 Hrs  T(C): 36.9 (15 May 2019 16:58), Max: 36.9 (15 May 2019 00:16)  T(F): 98.5 (15 May 2019 16:58), Max: 98.5 (15 May 2019 00:16)  HR: 64 (15 May 2019 16:58) (60 - 82)  BP: 153/70 (15 May 2019 16:58) (121/55 - 175/66)  BP(mean): --  RR: 18 (15 May 2019 16:58) (18 - 18)  SpO2: 93% (15 May 2019 16:58) (92% - 94%)    I&O's Detail    14 May 2019 07:01  -  15 May 2019 07:00  --------------------------------------------------------  IN:    lactated ringers.: 450 mL  Total IN: 450 mL    OUT:  Total OUT: 0 mL    Total NET: 450 mL      15 May 2019 07:01  -  15 May 2019 18:00  --------------------------------------------------------  IN:  Total IN: 0 mL    OUT:    Voided: 4 mL  Total OUT: 4 mL    Total NET: -4 mL        PE  Gen: NAD  Pulm: nonlabored breathing  CV: RRR  Abd: soft, nt, nd; incisions c/d/i  Ext: no cyanosis, clubbing, or edema  Neuro: AAOx3, no sensory or motor deficits    LABS:                        8.3    7.0   )-----------( 282      ( 15 May 2019 07:54 )             25.8     05-15    141  |  104  |  12.0  ----------------------------<  85  4.1   |  25.0  |  0.76    Ca    8.4<L>      15 May 2019 07:54  Phos  3.0     05-14  Mg     1.8     05-15            MEDICATIONS  (STANDING):  acetaminophen   Tablet ..      acetaminophen   Tablet .. 650 milliGRAM(s) Oral every 6 hours  ALBUTerol    90 MICROgram(s) HFA Inhaler 1 Puff(s) Inhalation every 4 hours  ALBUTerol/ipratropium for Nebulization 3 milliLiter(s) Nebulizer every 6 hours  aspirin enteric coated 81 milliGRAM(s) Oral daily  atorvastatin 20 milliGRAM(s) Oral at bedtime  clopidogrel Tablet 75 milliGRAM(s) Oral daily  diltiazem    milliGRAM(s) Oral daily  enoxaparin Injectable 40 milliGRAM(s) SubCutaneous daily  lactated ringers. 1000 milliLiter(s) (90 mL/Hr) IV Continuous <Continuous>  lisinopril 2.5 milliGRAM(s) Oral daily  metoprolol succinate ER 25 milliGRAM(s) Oral daily  metoprolol tartrate Injectable 5 milliGRAM(s) IV Push every 6 hours  pantoprazole  Injectable 40 milliGRAM(s) IV Push daily  tiotropium 18 MICROgram(s) Capsule 1 Capsule(s) Inhalation daily    MEDICATIONS  (PRN):  ondansetron Injectable 4 milliGRAM(s) IV Push every 6 hours PRN Nausea and/or Vomiting  traMADol 25 milliGRAM(s) Oral every 4 hours PRN Moderate Pain (4 - 6)      MICRO:   Cultures     STUDIES:   EKG, CXR, U/S, CT, MRI

## 2019-05-15 NOTE — CHART NOTE - NSCHARTNOTEFT_GEN_A_CORE
pt educated on benefits of drug Neb tx but still declines. NAD knows to call. pt has requested not to be awoke @ 3 am for next tx but knows to call if needed

## 2019-05-15 NOTE — PROGRESS NOTE ADULT - ASSESSMENT
82 y/o with hx of CAD s/p CABG x3 in 2011, hypertension, hyperlipidemia, moderate carotid atherosclerosis, breast cancer,  who initially presented 3 days ago with abdominal pain, nausea and vomiting.  Noted to have a small bowel obstruction.  She underwent ex lap, TERESA, small bowel resection and anastamosis and subsequently found with a mass in the distal ileum.  She normally resides in [facility] and comes annually to visit her doctors [place].  For the past 2 weeks has been having fatigue, and mild chest pain.  She went to her doctors in [facility] who noted a change in her ECG and advised her for further testing, which prompted her to come back [place] the [place].  On admission had a LBBB which is new from 2018.  Last night had acute onset severe chest pressure after OGT was removed.  ECG performed at the time showed LBBB but did also have 2-3 mm IVONE anteroseptally.  Troponins overnight were negative.  Currently she is chest pain free.   Interviewed with the aid of  via [employer]    ECG (tracing reviewed by me): SR, LBBB, dynamic IVONE with chest pain, immediately repeated with resolution of IVONE  Echo reviewed by me while it was being performed - Normal biventricular systolic function.  Estimated LVEF 55-60%, no severe valvular disease, no obvious regional wall motion abnormalities.   cath - patent grafts, no new obstructive CAD    # chest pain syndrome - unclear etiology; no new obstructive CAD  # POD 4 for SBO - exlap/TERESA/SB resection  # ileum mass   # transient LBBB in absence of new obstructive CAD, primarily conduction issue.  No further testing necessary  # known CAD - stable    continue ASA/Plavix, statin, metoprolol  await pathology results of ileum mass  discharge planning per surgical team  Will sign off.  Please call with questions or re-consult if needed  Thank you for allowing me to participate in care of your patient.   Please call as needed.

## 2019-05-15 NOTE — PROGRESS NOTE ADULT - SUBJECTIVE AND OBJECTIVE BOX
[facility]                                                        Office: [address] [address]                                                       Telephone: [phone]. Fax:[phone]      CARDIOLOGY PROGRESS NOTE   ([facility])    Subjective: Patient seen and examined earlier this morning.  + flatus.  Less abdominal pain.     ROS: No headache, no chest pain, no SOB, no palpitations, no dizziness, no nausea, no bleeding    Chronic Conditions:     CURRENT MEDICATIONS  diltiazem    milliGRAM(s) Oral daily  lisinopril 2.5 milliGRAM(s) Oral daily  metoprolol succinate ER 25 milliGRAM(s) Oral daily  metoprolol tartrate Injectable 5 milliGRAM(s) IV Push every 6 hours      ALBUTerol    90 MICROgram(s) HFA Inhaler 1 Puff(s) Inhalation every 4 hours  ALBUTerol/ipratropium for Nebulization 3 milliLiter(s) Nebulizer every 6 hours  tiotropium 18 MICROgram(s) Capsule 1 Capsule(s) Inhalation daily    acetaminophen   Tablet ..      acetaminophen   Tablet .. 650 milliGRAM(s) Oral every 6 hours  ondansetron Injectable 4 milliGRAM(s) IV Push every 6 hours PRN  traMADol 25 milliGRAM(s) Oral every 4 hours PRN    pantoprazole  Injectable 40 milliGRAM(s) IV Push daily    atorvastatin 20 milliGRAM(s) Oral at bedtime    aspirin enteric coated 81 milliGRAM(s) Oral daily  clopidogrel Tablet 75 milliGRAM(s) Oral daily  enoxaparin Injectable 40 milliGRAM(s) SubCutaneous daily  lactated ringers. 1000 milliLiter(s) IV Continuous <Continuous>    	  TELEMETRY:  off  Vitals:  T(C): 36.9 (05-15-19 @ 08:35), Max: 37.2 (05-14-19 @ 15:05)  HR: 60 (05-15-19 @ 09:18) (58 - 82)  BP: 137/54 (05-15-19 @ 08:35) (120/61 - 175/66)  RR: 18 (05-15-19 @ 08:35) (18 - 18)  SpO2: 92% (05-15-19 @ 09:18) (92% - 96%)  Wt(kg): --  I&O's Summary    14 May 2019 07:01  -  15 May 2019 07:00  --------------------------------------------------------  IN: 450 mL / OUT: 0 mL / NET: 450 mL        Daily T(C): 36.9 (05-15-19 @ 08:35), Max: 37.2 (05-14-19 @ 15:05)  HR: 60 (05-15-19 @ 09:18) (58 - 82)  BP: 137/54 (05-15-19 @ 08:35) (120/61 - 175/66)  RR: 18 (05-15-19 @ 08:35) (18 - 18)  SpO2: 92% (05-15-19 @ 09:18) (92% - 96%)  Wt(kg): --    Daily     PHYSICAL EXAM:  Appearance: Normal	  HEENT:   Normal oral mucosa, PERRL, EOMI	  Lymphatic: No lymphadenopathy  Cardiovascular: Normal S1 S2, No JVD, II/VI systolic murmur, No edema  Respiratory: Lungs clear to auscultation	  Psychiatry: A & O x 3, Mood & affect appropriate  Gastrointestinal:  Soft, Non-tender, + BS	; surgical site - clean  Skin: No rashes, No ecchymoses, No cyanosis  Neurologic: Non-focal  Extremities: Normal range of motion, No clubbing, cyanosis or edema  Vascular: Peripheral pulses palpable 2+ bilaterally, warm      ECG (tracing reviewed by me):  	    DIAGNOSTIC TESTING:  Echocardiogram (images reviewed by me): < from: TTE Echo Complete w/Doppler (05.13.19 @ 09:44) >  Summary:   1. Left ventricular ejection fraction, by visual estimation, is 55 to   60%.   2. Normal global left ventricular systolic function.   3. Spectral Doppler shows impaired relaxation pattern of left   ventricular myocardial filling (Grade I diastolic dysfunction).   4. Normal left ventricular internal cavity size.   5. Normal left atrial size.   6. Trace mitral valve regurgitation.   7. Mild to moderate pulmonic valve regurgitation.   8. There is no evidence of pericardial effusion.    MD Efra Electronically signed on 5/13/2019 at 4:36:10 PM    < end of copied text >                          8.3    7.0   )-----------( 282      ( 15 May 2019 07:54 )             25.8     05-15    141  |  104  |  12.0  ----------------------------<  85  4.1   |  25.0  |  0.76    Ca    8.4<L>      15 May 2019 07:54  Phos  3.0     05-14  Mg     1.8     05-15

## 2019-05-15 NOTE — PROGRESS NOTE ADULT - ASSESSMENT
83F h/o CAD s/p CABG x 3, breast cancer s/p lumpectomy now s/p ex-lap and small bowel resection 2/2 closed loop small bowel obstruction; had card cath 5/13/19 which was negative; cp resolved    - chest PT, aggressive IS, duonebs  - restarted Plavix  - Advance to clears today

## 2019-05-16 ENCOUNTER — TRANSCRIPTION ENCOUNTER (OUTPATIENT)
Age: 84
End: 2019-05-16

## 2019-05-16 VITALS
TEMPERATURE: 98 F | OXYGEN SATURATION: 97 % | HEART RATE: 68 BPM | SYSTOLIC BLOOD PRESSURE: 166 MMHG | DIASTOLIC BLOOD PRESSURE: 66 MMHG | RESPIRATION RATE: 18 BRPM

## 2019-05-16 LAB
ANION GAP SERPL CALC-SCNC: 10 MMOL/L — SIGNIFICANT CHANGE UP (ref 5–17)
BASOPHILS # BLD AUTO: 0 K/UL — SIGNIFICANT CHANGE UP (ref 0–0.2)
BASOPHILS NFR BLD AUTO: 0.4 % — SIGNIFICANT CHANGE UP (ref 0–2)
BUN SERPL-MCNC: 12 MG/DL — SIGNIFICANT CHANGE UP (ref 8–20)
CALCIUM SERPL-MCNC: 8.6 MG/DL — SIGNIFICANT CHANGE UP (ref 8.6–10.2)
CHLORIDE SERPL-SCNC: 106 MMOL/L — SIGNIFICANT CHANGE UP (ref 98–107)
CO2 SERPL-SCNC: 25 MMOL/L — SIGNIFICANT CHANGE UP (ref 22–29)
CREAT SERPL-MCNC: 0.63 MG/DL — SIGNIFICANT CHANGE UP (ref 0.5–1.3)
EOSINOPHIL # BLD AUTO: 0.4 K/UL — SIGNIFICANT CHANGE UP (ref 0–0.5)
EOSINOPHIL NFR BLD AUTO: 6.7 % — HIGH (ref 0–6)
GLUCOSE SERPL-MCNC: 100 MG/DL — SIGNIFICANT CHANGE UP (ref 70–115)
HCT VFR BLD CALC: 25.6 % — LOW (ref 37–47)
HGB BLD-MCNC: 8.2 G/DL — LOW (ref 12–16)
LYMPHOCYTES # BLD AUTO: 1.7 K/UL — SIGNIFICANT CHANGE UP (ref 1–4.8)
LYMPHOCYTES # BLD AUTO: 24.9 % — SIGNIFICANT CHANGE UP (ref 20–55)
MAGNESIUM SERPL-MCNC: 1.7 MG/DL — SIGNIFICANT CHANGE UP (ref 1.6–2.6)
MCHC RBC-ENTMCNC: 27.5 PG — SIGNIFICANT CHANGE UP (ref 27–31)
MCHC RBC-ENTMCNC: 32 G/DL — SIGNIFICANT CHANGE UP (ref 32–36)
MCV RBC AUTO: 85.9 FL — SIGNIFICANT CHANGE UP (ref 81–99)
MONOCYTES # BLD AUTO: 0.6 K/UL — SIGNIFICANT CHANGE UP (ref 0–0.8)
MONOCYTES NFR BLD AUTO: 9.1 % — SIGNIFICANT CHANGE UP (ref 3–10)
NEUTROPHILS # BLD AUTO: 3.9 K/UL — SIGNIFICANT CHANGE UP (ref 1.8–8)
NEUTROPHILS NFR BLD AUTO: 58.5 % — SIGNIFICANT CHANGE UP (ref 37–73)
PHOSPHATE SERPL-MCNC: 4 MG/DL — SIGNIFICANT CHANGE UP (ref 2.4–4.7)
PLATELET # BLD AUTO: 299 K/UL — SIGNIFICANT CHANGE UP (ref 150–400)
POTASSIUM SERPL-MCNC: 3.8 MMOL/L — SIGNIFICANT CHANGE UP (ref 3.5–5.3)
POTASSIUM SERPL-SCNC: 3.8 MMOL/L — SIGNIFICANT CHANGE UP (ref 3.5–5.3)
RBC # BLD: 2.98 M/UL — LOW (ref 4.4–5.2)
RBC # FLD: 14.7 % — SIGNIFICANT CHANGE UP (ref 11–15.6)
SODIUM SERPL-SCNC: 141 MMOL/L — SIGNIFICANT CHANGE UP (ref 135–145)
WBC # BLD: 6.7 K/UL — SIGNIFICANT CHANGE UP (ref 4.8–10.8)
WBC # FLD AUTO: 6.7 K/UL — SIGNIFICANT CHANGE UP (ref 4.8–10.8)

## 2019-05-16 PROCEDURE — 85027 COMPLETE CBC AUTOMATED: CPT

## 2019-05-16 PROCEDURE — C1760: CPT

## 2019-05-16 PROCEDURE — 88307 TISSUE EXAM BY PATHOLOGIST: CPT

## 2019-05-16 PROCEDURE — T1013: CPT

## 2019-05-16 PROCEDURE — 83735 ASSAY OF MAGNESIUM: CPT

## 2019-05-16 PROCEDURE — 88304 TISSUE EXAM BY PATHOLOGIST: CPT

## 2019-05-16 PROCEDURE — C1769: CPT

## 2019-05-16 PROCEDURE — 93005 ELECTROCARDIOGRAM TRACING: CPT

## 2019-05-16 PROCEDURE — 86900 BLOOD TYPING SEROLOGIC ABO: CPT

## 2019-05-16 PROCEDURE — 80053 COMPREHEN METABOLIC PANEL: CPT

## 2019-05-16 PROCEDURE — 86901 BLOOD TYPING SEROLOGIC RH(D): CPT

## 2019-05-16 PROCEDURE — 94640 AIRWAY INHALATION TREATMENT: CPT

## 2019-05-16 PROCEDURE — 93459 L HRT ART/GRFT ANGIO: CPT

## 2019-05-16 PROCEDURE — 86850 RBC ANTIBODY SCREEN: CPT

## 2019-05-16 PROCEDURE — 82803 BLOOD GASES ANY COMBINATION: CPT

## 2019-05-16 PROCEDURE — 83690 ASSAY OF LIPASE: CPT

## 2019-05-16 PROCEDURE — 36415 COLL VENOUS BLD VENIPUNCTURE: CPT

## 2019-05-16 PROCEDURE — 81001 URINALYSIS AUTO W/SCOPE: CPT

## 2019-05-16 PROCEDURE — 80048 BASIC METABOLIC PNL TOTAL CA: CPT

## 2019-05-16 PROCEDURE — C1887: CPT

## 2019-05-16 PROCEDURE — 85610 PROTHROMBIN TIME: CPT

## 2019-05-16 PROCEDURE — 71045 X-RAY EXAM CHEST 1 VIEW: CPT

## 2019-05-16 PROCEDURE — 75716 ARTERY X-RAYS ARMS/LEGS: CPT | Mod: XU

## 2019-05-16 PROCEDURE — 86480 TB TEST CELL IMMUN MEASURE: CPT

## 2019-05-16 PROCEDURE — 99285 EMERGENCY DEPT VISIT HI MDM: CPT | Mod: 25

## 2019-05-16 PROCEDURE — 97163 PT EVAL HIGH COMPLEX 45 MIN: CPT

## 2019-05-16 PROCEDURE — 84100 ASSAY OF PHOSPHORUS: CPT

## 2019-05-16 PROCEDURE — 82550 ASSAY OF CK (CPK): CPT

## 2019-05-16 PROCEDURE — 94760 N-INVAS EAR/PLS OXIMETRY 1: CPT

## 2019-05-16 PROCEDURE — 99024 POSTOP FOLLOW-UP VISIT: CPT

## 2019-05-16 PROCEDURE — 84484 ASSAY OF TROPONIN QUANT: CPT

## 2019-05-16 PROCEDURE — 99152 MOD SED SAME PHYS/QHP 5/>YRS: CPT

## 2019-05-16 PROCEDURE — 83605 ASSAY OF LACTIC ACID: CPT

## 2019-05-16 PROCEDURE — 99153 MOD SED SAME PHYS/QHP EA: CPT

## 2019-05-16 PROCEDURE — 93306 TTE W/DOPPLER COMPLETE: CPT

## 2019-05-16 PROCEDURE — C1894: CPT

## 2019-05-16 PROCEDURE — 74177 CT ABD & PELVIS W/CONTRAST: CPT

## 2019-05-16 PROCEDURE — 93970 EXTREMITY STUDY: CPT

## 2019-05-16 PROCEDURE — 85730 THROMBOPLASTIN TIME PARTIAL: CPT

## 2019-05-16 RX ORDER — TIOTROPIUM BROMIDE 18 UG/1
1 CAPSULE ORAL; RESPIRATORY (INHALATION)
Qty: 0 | Refills: 0 | DISCHARGE
Start: 2019-05-16

## 2019-05-16 RX ORDER — POTASSIUM CHLORIDE 20 MEQ
40 PACKET (EA) ORAL ONCE
Refills: 0 | Status: COMPLETED | OUTPATIENT
Start: 2019-05-16 | End: 2019-05-16

## 2019-05-16 RX ORDER — DILTIAZEM HCL 120 MG
1 CAPSULE, EXT RELEASE 24 HR ORAL
Qty: 0 | Refills: 0 | DISCHARGE
Start: 2019-05-16

## 2019-05-16 RX ORDER — MAGNESIUM SULFATE 500 MG/ML
2 VIAL (ML) INJECTION ONCE
Refills: 0 | Status: COMPLETED | OUTPATIENT
Start: 2019-05-16 | End: 2019-05-16

## 2019-05-16 RX ORDER — ALBUTEROL 90 UG/1
1 AEROSOL, METERED ORAL
Qty: 0 | Refills: 0 | DISCHARGE
Start: 2019-05-16

## 2019-05-16 RX ORDER — TRAMADOL HYDROCHLORIDE 50 MG/1
0.5 TABLET ORAL
Qty: 20 | Refills: 0
Start: 2019-05-16

## 2019-05-16 RX ADMIN — Medication 3 MILLILITER(S): at 09:43

## 2019-05-16 RX ADMIN — Medication 25 MILLIGRAM(S): at 05:33

## 2019-05-16 RX ADMIN — LISINOPRIL 2.5 MILLIGRAM(S): 2.5 TABLET ORAL at 05:34

## 2019-05-16 RX ADMIN — Medication 40 MILLIEQUIVALENT(S): at 11:49

## 2019-05-16 RX ADMIN — Medication 120 MILLIGRAM(S): at 05:33

## 2019-05-16 RX ADMIN — Medication 5 MILLIGRAM(S): at 05:32

## 2019-05-16 RX ADMIN — Medication 650 MILLIGRAM(S): at 05:33

## 2019-05-16 RX ADMIN — Medication 650 MILLIGRAM(S): at 11:48

## 2019-05-16 RX ADMIN — Medication 81 MILLIGRAM(S): at 11:47

## 2019-05-16 RX ADMIN — Medication 50 GRAM(S): at 13:11

## 2019-05-16 RX ADMIN — CLOPIDOGREL BISULFATE 75 MILLIGRAM(S): 75 TABLET, FILM COATED ORAL at 11:47

## 2019-05-16 RX ADMIN — ENOXAPARIN SODIUM 40 MILLIGRAM(S): 100 INJECTION SUBCUTANEOUS at 11:47

## 2019-05-16 NOTE — PROGRESS NOTE ADULT - SUBJECTIVE AND OBJECTIVE BOX
INTERVAL HPI/OVERNIGHT EVENTS/SUBJECTIVE:  POD 4 exlap, Small bowel resection.  Pt doing well.  Toelrating clears, + Flatus.  States in hungry.  No acute events overnight.      ICU Vital Signs Last 24 Hrs  T(C): 36.9 (16 May 2019 07:42), Max: 37.1 (16 May 2019 00:39)  T(F): 98.4 (16 May 2019 07:42), Max: 98.8 (16 May 2019 00:39)  HR: 60 (16 May 2019 09:46) (60 - 73)  BP: 130/67 (16 May 2019 07:42) (130/67 - 156/60)  BP(mean): --  ABP: --  ABP(mean): --  RR: 18 (16 May 2019 07:42) (18 - 18)  SpO2: 94% (16 May 2019 09:46) (93% - 95%)      I&O's Detail    15 May 2019 07:01  -  16 May 2019 07:00  --------------------------------------------------------  IN:    lactated ringers.: 720 mL    Oral Fluid: 540 mL  Total IN: 1260 mL    OUT:    Voided: 6 mL  Total OUT: 6 mL    Total NET: 1254 mL      16 May 2019 07:01  -  16 May 2019 14:41  --------------------------------------------------------  IN:    Oral Fluid: 240 mL  Total IN: 240 mL    OUT:  Total OUT: 0 mL    Total NET: 240 mL                MEDICATIONS  (STANDING):  acetaminophen   Tablet ..      acetaminophen   Tablet .. 650 milliGRAM(s) Oral every 6 hours  ALBUTerol    90 MICROgram(s) HFA Inhaler 1 Puff(s) Inhalation every 4 hours  ALBUTerol/ipratropium for Nebulization 3 milliLiter(s) Nebulizer every 6 hours  aspirin enteric coated 81 milliGRAM(s) Oral daily  atorvastatin 20 milliGRAM(s) Oral at bedtime  clopidogrel Tablet 75 milliGRAM(s) Oral daily  diltiazem    milliGRAM(s) Oral daily  enoxaparin Injectable 40 milliGRAM(s) SubCutaneous daily  lisinopril 2.5 milliGRAM(s) Oral daily  metoprolol succinate ER 25 milliGRAM(s) Oral daily  tiotropium 18 MICROgram(s) Capsule 1 Capsule(s) Inhalation daily    MEDICATIONS  (PRN):  ondansetron Injectable 4 milliGRAM(s) IV Push every 6 hours PRN Nausea and/or Vomiting  traMADol 25 milliGRAM(s) Oral every 4 hours PRN Moderate Pain (4 - 6)      NUTRITION/IVF: CLD/IVL      PHYSICAL EXAM:    Gen:  NAD    Eyes:  EOMI's    Neurological:  A&O x3    ENMT:  MMM    Neck:  Trachea midline    Pulmonary:  Unlabored    Cardiovascular:  NSR    Gastrointestinal:  Soft, NT/ND    Genitourinary:  Voids    Extremities:  No pedal edema    Skin:  Post op incision C/D/I, healing well, no drainage    Musculoskeletal:  AFROM x4          LABS:  CBC Full  -  ( 16 May 2019 07:51 )  WBC Count : 6.7 K/uL  RBC Count : 2.98 M/uL  Hemoglobin : 8.2 g/dL  Hematocrit : 25.6 %  Platelet Count - Automated : 299 K/uL  Mean Cell Volume : 85.9 fl  Mean Cell Hemoglobin : 27.5 pg  Mean Cell Hemoglobin Concentration : 32.0 g/dL  Auto Neutrophil # : 3.9 K/uL  Auto Lymphocyte # : 1.7 K/uL  Auto Monocyte # : 0.6 K/uL  Auto Eosinophil # : 0.4 K/uL  Auto Basophil # : 0.0 K/uL  Auto Neutrophil % : 58.5 %  Auto Lymphocyte % : 24.9 %  Auto Monocyte % : 9.1 %  Auto Eosinophil % : 6.7 %  Auto Basophil % : 0.4 %    05-16    141  |  106  |  12.0  ----------------------------<  100  3.8   |  25.0  |  0.63    Ca    8.6      16 May 2019 07:51  Phos  4.0     05-16  Mg     1.7     05-16          RECENT CULTURES:            CAPILLARY BLOOD GLUCOSE      RADIOLOGY & ADDITIONAL STUDIES:    ASSESSMENT/PLAN:  83yFemale presenting with:  small bowel mass causing SBO POD 4 exlap, small bowel resection, hypomagnesemia    Neuro:  Pain well controlled on PO pain meds    CV:  Hemodynamically normal, afebrile    Pulm:  IS, ambulation    GI/Nutrition:  Advance to regular diet today/IVL    /Renal:  Voids, replace Magnesium    ID:  None, no leukocytosis    Lines/Tubes:  PIV    Endo:  No issues    Skin:  General wound care    Proph: ASA, Plavix, Lovenox    Dispo:  If pt tolerates reg diet, stable for d/c home today.  Plan to follow up with Cards 2 weeks from d/c      CRITICAL CARE TIME SPENT:

## 2019-05-16 NOTE — PHYSICAL THERAPY INITIAL EVALUATION ADULT - ADDITIONAL COMMENTS
per patient she only uses the RW when she is in the hospital. Pt reports that she may own one but is unsure as she does not use it at home. pt has 3 steps to enter the home with a single hand rail. Her bedroom is on the first floor and she does not need to use the stairs to the second floor.

## 2019-05-16 NOTE — PROGRESS NOTE ADULT - PROVIDER SPECIALTY LIST ADULT
Anesthesia
Cardiology
Surgery
Surgery
Trauma Surgery
Cardiology
Surgery

## 2019-05-16 NOTE — PHYSICAL THERAPY INITIAL EVALUATION ADULT - GENERAL OBSERVATIONS, REHAB EVAL
Pt rec'd in room supine in bed breathing RA in NAD, eager to participate. RN disconnected IV from neck to pt may participate in OOB activity.

## 2019-05-20 LAB
GAMMA INTERFERON BACKGROUND BLD IA-ACNC: 0.01 IU/ML — SIGNIFICANT CHANGE UP
M TB IFN-G BLD-IMP: ABNORMAL
M TB IFN-G CD4+ BCKGRND COR BLD-ACNC: 0.07 IU/ML — SIGNIFICANT CHANGE UP
M TB IFN-G CD4+CD8+ BCKGRND COR BLD-ACNC: 0.02 IU/ML — SIGNIFICANT CHANGE UP
QUANT TB PLUS MITOGEN MINUS NIL: 0.07 IU/ML — SIGNIFICANT CHANGE UP

## 2019-05-30 ENCOUNTER — APPOINTMENT (OUTPATIENT)
Dept: TRAUMA SURGERY | Facility: CLINIC | Age: 84
End: 2019-05-30
Payer: MEDICARE

## 2019-05-30 VITALS
TEMPERATURE: 97.8 F | HEIGHT: 68.75 IN | SYSTOLIC BLOOD PRESSURE: 133 MMHG | BODY MASS INDEX: 15.85 KG/M2 | OXYGEN SATURATION: 98 % | DIASTOLIC BLOOD PRESSURE: 74 MMHG | HEART RATE: 85 BPM | WEIGHT: 107 LBS

## 2019-05-30 PROCEDURE — 99024 POSTOP FOLLOW-UP VISIT: CPT

## 2019-06-03 NOTE — ASSESSMENT
[FreeTextEntry1] : RTC 4 weeks  for  recheck - daughter going away on vacation  patient unable to come sooner\par continue  present  medical  management  and  postop care \par no heavy  lifting\par any acute symptoms  and  or  concerns call back [facility] or  go  directly to [facility]

## 2019-06-03 NOTE — REVIEW OF SYSTEMS
[Abdominal Pain] : abdominal pain [Negative] : Psychiatric [FreeTextEntry7] : s/p ex lap   SBO lysis of adhesions

## 2019-06-03 NOTE — PHYSICAL EXAM
[Normal Breath Sounds] : Normal breath sounds [Normal Heart Sounds] : normal heart sounds [Alert] : alert [Abdomen Tenderness] : ~T ~M No abdominal tenderness [Abdominal Masses] : No abdominal masses [Oriented to Place] : oriented to place [Oriented to Person] : oriented to person [Oriented to Time] : oriented to time [Calm] : calm [de-identified] : wdwn female  in  nad  pleasant  manner [de-identified] : non icteric sclera  PERRLA EOMS intact  [de-identified] : from trachea  midline [de-identified] : soft  flat  non  tender no distension of  abdomen    incision site approximated  with  staples   all staples  removed without consequence    skin edges c/d/i no  signs  of  cellulitis  no  formation of  seroma  no guarding  no rebound

## 2019-06-03 NOTE — HISTORY OF PRESENT ILLNESS
[FreeTextEntry1] : Patient presents  to [facility]  clinic  for  post op exam  s/p closed loop small bowel obstruction Exploratory laparotomy lysis of adhesions  and small bowel resection performed   patient  at  time  of  this  exam  denies  any acute symptoms   minimal abdominal discomfort  no  fevers  no  chills   no  n/v/d   staples  are  pinching  skin a  bit    nl  bm nl urination  daughter at bedside  for  entire  exam

## 2019-06-03 NOTE — VITALS
[Tender] : tender [Occasional] : occasional [FreeTextEntry1] : rest [FreeTextEntry3] : incision site [FreeTextEntry2] : too much activity

## 2019-06-07 ENCOUNTER — INPATIENT (INPATIENT)
Facility: HOSPITAL | Age: 84
LOS: 2 days | Discharge: HOME | DRG: 395 | End: 2019-06-10
Attending: SURGERY | Admitting: SURGERY
Payer: COMMERCIAL

## 2019-06-07 VITALS
TEMPERATURE: 98 F | HEIGHT: 55 IN | RESPIRATION RATE: 20 BRPM | HEART RATE: 84 BPM | SYSTOLIC BLOOD PRESSURE: 119 MMHG | DIASTOLIC BLOOD PRESSURE: 58 MMHG | OXYGEN SATURATION: 99 % | WEIGHT: 106.04 LBS

## 2019-06-07 DIAGNOSIS — Z90.49 ACQUIRED ABSENCE OF OTHER SPECIFIED PARTS OF DIGESTIVE TRACT: Chronic | ICD-10-CM

## 2019-06-07 DIAGNOSIS — R10.9 UNSPECIFIED ABDOMINAL PAIN: ICD-10-CM

## 2019-06-07 DIAGNOSIS — K80.20 CALCULUS OF GALLBLADDER WITHOUT CHOLECYSTITIS WITHOUT OBSTRUCTION: ICD-10-CM

## 2019-06-07 LAB
ALBUMIN SERPL ELPH-MCNC: 4.1 G/DL — SIGNIFICANT CHANGE UP (ref 3.3–5.2)
ALP SERPL-CCNC: 525 U/L — HIGH (ref 40–120)
ALT FLD-CCNC: 371 U/L — HIGH
ANION GAP SERPL CALC-SCNC: 14 MMOL/L — SIGNIFICANT CHANGE UP (ref 5–17)
APPEARANCE UR: CLEAR — SIGNIFICANT CHANGE UP
AST SERPL-CCNC: 1003 U/L — HIGH
BASOPHILS # BLD AUTO: 0 K/UL — SIGNIFICANT CHANGE UP (ref 0–0.2)
BASOPHILS NFR BLD AUTO: 0.1 % — SIGNIFICANT CHANGE UP (ref 0–2)
BILIRUB SERPL-MCNC: 1 MG/DL — SIGNIFICANT CHANGE UP (ref 0.4–2)
BILIRUB UR-MCNC: ABNORMAL
BLD GP AB SCN SERPL QL: SIGNIFICANT CHANGE UP
BUN SERPL-MCNC: 26 MG/DL — HIGH (ref 8–20)
CALCIUM SERPL-MCNC: 9.3 MG/DL — SIGNIFICANT CHANGE UP (ref 8.6–10.2)
CHLORIDE SERPL-SCNC: 106 MMOL/L — SIGNIFICANT CHANGE UP (ref 98–107)
CO2 SERPL-SCNC: 20 MMOL/L — LOW (ref 22–29)
COLOR SPEC: YELLOW — SIGNIFICANT CHANGE UP
CREAT SERPL-MCNC: 0.66 MG/DL — SIGNIFICANT CHANGE UP (ref 0.5–1.3)
DIFF PNL FLD: NEGATIVE — SIGNIFICANT CHANGE UP
EOSINOPHIL # BLD AUTO: 0 K/UL — SIGNIFICANT CHANGE UP (ref 0–0.5)
EOSINOPHIL NFR BLD AUTO: 0.2 % — SIGNIFICANT CHANGE UP (ref 0–6)
EPI CELLS # UR: SIGNIFICANT CHANGE UP
GLUCOSE SERPL-MCNC: 131 MG/DL — HIGH (ref 70–115)
GLUCOSE UR QL: NEGATIVE MG/DL — SIGNIFICANT CHANGE UP
HCT VFR BLD CALC: 28.9 % — LOW (ref 37–47)
HGB BLD-MCNC: 9.2 G/DL — LOW (ref 12–16)
KETONES UR-MCNC: NEGATIVE — SIGNIFICANT CHANGE UP
LEUKOCYTE ESTERASE UR-ACNC: ABNORMAL
LIDOCAIN IGE QN: 93 U/L — HIGH (ref 22–51)
LYMPHOCYTES # BLD AUTO: 0.6 K/UL — LOW (ref 1–4.8)
LYMPHOCYTES # BLD AUTO: 4.9 % — LOW (ref 20–55)
MCHC RBC-ENTMCNC: 26.8 PG — LOW (ref 27–31)
MCHC RBC-ENTMCNC: 31.8 G/DL — LOW (ref 32–36)
MCV RBC AUTO: 84.3 FL — SIGNIFICANT CHANGE UP (ref 81–99)
MONOCYTES # BLD AUTO: 1 K/UL — HIGH (ref 0–0.8)
MONOCYTES NFR BLD AUTO: 8.4 % — SIGNIFICANT CHANGE UP (ref 3–10)
NEUTROPHILS # BLD AUTO: 10.1 K/UL — HIGH (ref 1.8–8)
NEUTROPHILS NFR BLD AUTO: 86.1 % — HIGH (ref 37–73)
NITRITE UR-MCNC: NEGATIVE — SIGNIFICANT CHANGE UP
PH UR: 6.5 — SIGNIFICANT CHANGE UP (ref 5–8)
PLATELET # BLD AUTO: 361 K/UL — SIGNIFICANT CHANGE UP (ref 150–400)
POTASSIUM SERPL-MCNC: 4.4 MMOL/L — SIGNIFICANT CHANGE UP (ref 3.5–5.3)
POTASSIUM SERPL-SCNC: 4.4 MMOL/L — SIGNIFICANT CHANGE UP (ref 3.5–5.3)
PROT SERPL-MCNC: 7 G/DL — SIGNIFICANT CHANGE UP (ref 6.6–8.7)
PROT UR-MCNC: 30 MG/DL
RBC # BLD: 3.43 M/UL — LOW (ref 4.4–5.2)
RBC # FLD: 14.2 % — SIGNIFICANT CHANGE UP (ref 11–15.6)
RBC CASTS # UR COMP ASSIST: NEGATIVE /HPF — SIGNIFICANT CHANGE UP (ref 0–4)
SODIUM SERPL-SCNC: 140 MMOL/L — SIGNIFICANT CHANGE UP (ref 135–145)
SP GR SPEC: 1.01 — SIGNIFICANT CHANGE UP (ref 1.01–1.02)
TYPE + AB SCN PNL BLD: SIGNIFICANT CHANGE UP
UROBILINOGEN FLD QL: 1 MG/DL
WBC # BLD: 11.7 K/UL — HIGH (ref 4.8–10.8)
WBC # FLD AUTO: 11.7 K/UL — HIGH (ref 4.8–10.8)
WBC UR QL: SIGNIFICANT CHANGE UP

## 2019-06-07 PROCEDURE — 74019 RADEX ABDOMEN 2 VIEWS: CPT | Mod: 26

## 2019-06-07 PROCEDURE — 99285 EMERGENCY DEPT VISIT HI MDM: CPT

## 2019-06-07 PROCEDURE — 74176 CT ABD & PELVIS W/O CONTRAST: CPT | Mod: 26

## 2019-06-07 PROCEDURE — 71045 X-RAY EXAM CHEST 1 VIEW: CPT | Mod: 26

## 2019-06-07 PROCEDURE — 71045 X-RAY EXAM CHEST 1 VIEW: CPT | Mod: 26,77

## 2019-06-07 PROCEDURE — 99223 1ST HOSP IP/OBS HIGH 75: CPT

## 2019-06-07 RX ORDER — TIOTROPIUM BROMIDE 18 UG/1
1 CAPSULE ORAL; RESPIRATORY (INHALATION) DAILY
Refills: 0 | Status: DISCONTINUED | OUTPATIENT
Start: 2019-06-07 | End: 2019-06-10

## 2019-06-07 RX ORDER — DILTIAZEM HCL 120 MG
120 CAPSULE, EXT RELEASE 24 HR ORAL DAILY
Refills: 0 | Status: DISCONTINUED | OUTPATIENT
Start: 2019-06-07 | End: 2019-06-10

## 2019-06-07 RX ORDER — ALBUTEROL 90 UG/1
1 AEROSOL, METERED ORAL EVERY 4 HOURS
Refills: 0 | Status: DISCONTINUED | OUTPATIENT
Start: 2019-06-07 | End: 2019-06-10

## 2019-06-07 RX ORDER — MORPHINE SULFATE 50 MG/1
2 CAPSULE, EXTENDED RELEASE ORAL ONCE
Refills: 0 | Status: DISCONTINUED | OUTPATIENT
Start: 2019-06-07 | End: 2019-06-07

## 2019-06-07 RX ORDER — ONDANSETRON 8 MG/1
4 TABLET, FILM COATED ORAL ONCE
Refills: 0 | Status: DISCONTINUED | OUTPATIENT
Start: 2019-06-07 | End: 2019-06-10

## 2019-06-07 RX ORDER — IBUPROFEN 200 MG
400 TABLET ORAL EVERY 6 HOURS
Refills: 0 | Status: DISCONTINUED | OUTPATIENT
Start: 2019-06-07 | End: 2019-06-10

## 2019-06-07 RX ORDER — LISINOPRIL 2.5 MG/1
2.5 TABLET ORAL DAILY
Refills: 0 | Status: DISCONTINUED | OUTPATIENT
Start: 2019-06-07 | End: 2019-06-10

## 2019-06-07 RX ORDER — SODIUM CHLORIDE 9 MG/ML
1000 INJECTION, SOLUTION INTRAVENOUS
Refills: 0 | Status: DISCONTINUED | OUTPATIENT
Start: 2019-06-07 | End: 2019-06-09

## 2019-06-07 RX ORDER — ATORVASTATIN CALCIUM 80 MG/1
10 TABLET, FILM COATED ORAL AT BEDTIME
Refills: 0 | Status: DISCONTINUED | OUTPATIENT
Start: 2019-06-07 | End: 2019-06-10

## 2019-06-07 RX ORDER — METOPROLOL TARTRATE 50 MG
25 TABLET ORAL DAILY
Refills: 0 | Status: DISCONTINUED | OUTPATIENT
Start: 2019-06-07 | End: 2019-06-10

## 2019-06-07 RX ORDER — ACETAMINOPHEN 500 MG
650 TABLET ORAL ONCE
Refills: 0 | Status: COMPLETED | OUTPATIENT
Start: 2019-06-07 | End: 2019-06-07

## 2019-06-07 RX ORDER — ONDANSETRON 8 MG/1
4 TABLET, FILM COATED ORAL EVERY 6 HOURS
Refills: 0 | Status: DISCONTINUED | OUTPATIENT
Start: 2019-06-07 | End: 2019-06-10

## 2019-06-07 RX ORDER — PANTOPRAZOLE SODIUM 20 MG/1
40 TABLET, DELAYED RELEASE ORAL
Refills: 0 | Status: DISCONTINUED | OUTPATIENT
Start: 2019-06-07 | End: 2019-06-10

## 2019-06-07 RX ORDER — ENOXAPARIN SODIUM 100 MG/ML
40 INJECTION SUBCUTANEOUS EVERY 24 HOURS
Refills: 0 | Status: DISCONTINUED | OUTPATIENT
Start: 2019-06-07 | End: 2019-06-07

## 2019-06-07 RX ADMIN — Medication 10 MILLIGRAM(S): at 18:52

## 2019-06-07 RX ADMIN — Medication 650 MILLIGRAM(S): at 13:38

## 2019-06-07 RX ADMIN — ATORVASTATIN CALCIUM 10 MILLIGRAM(S): 80 TABLET, FILM COATED ORAL at 22:27

## 2019-06-07 RX ADMIN — SODIUM CHLORIDE 100 MILLILITER(S): 9 INJECTION, SOLUTION INTRAVENOUS at 19:03

## 2019-06-07 RX ADMIN — Medication 650 MILLIGRAM(S): at 17:50

## 2019-06-07 NOTE — ED ADULT NURSE NOTE - PMH
Breast cancer    CAD (coronary artery disease)    Carotid stenosis    Hyperlipidemia    Hypertension    Osteoarthritis    Osteopenia    SBO (small bowel obstruction)

## 2019-06-07 NOTE — ED STATDOCS - PROGRESS NOTE DETAILS
84 y/o F pt with hx of Breast CA, CAD and carotid stenosis; 1 month s/p SBO; PSHX cholecystectomy and  x 2 presents to ED c/o constipation x 4 days. Daughter indicates patient developed ABD pain last night with associated nausea and vomiting. PE: ABD is slightly distended, with a vertical midline surgical incision with significant scar tissue that is clean dry and intact. Has +tenderness to epigastrium and is moaning in pain. Patient with complicated surgical history; will be transferred to [facility] for further workup. Will order basic labs and ABD CT.  GI: Amaturo  Card: Michael

## 2019-06-07 NOTE — CONSULT NOTE ADULT - SUBJECTIVE AND OBJECTIVE BOX
HPI:  83 year old female, with HTN, HLD, CAD, remote CABG,,  and PCI on chronic Aspirin / Plavix with multiple prior abdominal surgeries.  2 prior C Sections and open jacqueline in  for perforated cholecystitis; more recent Small bowel obstruction with ileal resection in . Also hx of OA and Osteopenia.    Pt was  brought in by daughter for 1 day history of epigastric abdominal pain that is burning in nature, radiating to the back.  She had several episodes of N/V earlier today, non-bloody.   Denies fever, chills or jaundice.  Last meal was yesterday without abdominal pain. Last bowel movement was 4 days ago, but patient continues to report passing flatus daily.   Last Aspirin/ Plavix was yesterday, .    ED CT scan:  Intact ileal surgical anastamosis; absent GB.  Dilated CBD with CBD stones.  No pancreatitis.    Recent Cardiac cath:  Proximal and distal LAD stenosis with distal graft.  Minor luminal stenoses else where.         PAST MEDICAL & SURGICAL HISTORY:  SBO (small bowel obstruction)  Osteoarthritis  Osteopenia  Breast cancer  Hyperlipidemia  Hypertension  Carotid stenosis  CAD (coronary artery disease)  S/P small bowel resection  S/P cholecystectomy  H/O benign breast biopsy: right, 10/1/14  S/P lumpectomy, right breast  S/P CABG x 3:       ROS:  No Heartburn, regurgitation, dysphagia, odynophagia.  No dyspepsia  No abdominal pain.    No Nausea, vomiting.  No Bleeding.  No hematemesis.   No diarrhea.    No hematochesia.  No weight loss, anorexia.  No edema.      MEDICATIONS  (STANDING):  ALBUTerol    90 MICROgram(s) HFA Inhaler 1 Puff(s) Inhalation every 4 hours  atorvastatin 10 milliGRAM(s) Oral at bedtime  diltiazem    milliGRAM(s) Oral daily  lactated ringers. 1000 milliLiter(s) (100 mL/Hr) IV Continuous <Continuous>  lisinopril Oral Tab/Cap - Peds 2.5 milliGRAM(s) Oral daily  metoprolol succinate ER 25 milliGRAM(s) Oral daily  ondansetron Injectable 4 milliGRAM(s) IV Push once  pantoprazole    Tablet 40 milliGRAM(s) Oral before breakfast  tiotropium 18 MICROgram(s) Capsule 1 Capsule(s) Inhalation daily    MEDICATIONS  (PRN):  ibuprofen  Tablet. 400 milliGRAM(s) Oral every 6 hours PRN Temp greater or equal to 38.5C (101.3F)  ondansetron Injectable 4 milliGRAM(s) IV Push every 6 hours PRN Nausea      Allergies  No Known Allergies    SOCIAL HISTORY:  Non smoker. No Etoh abuse.  No IVDU.      FAMILY HISTORY:  Family history of heart disease (Sibling): 6 siblings had CABG, 8 siblings had heart disease      Vital Signs Last 24 Hrs  T(C): 37.4 (2019 19:40), Max: 37.4 (2019 19:40)  T(F): 99.3 (2019 19:40), Max: 99.3 (2019 19:40)  HR: 87 (2019 21:13) (74 - 89)  BP: 121/56 (2019 19:40) (119/58 - 121/56)  BP(mean): 78 (2019 18:01) (78 - 78)  RR: 18 (2019 19:40) (18 - 20)  SpO2: 98% (2019 21:13) (98% - 99%)    PHYSICAL EXAM:    GENERAL: NAD, well-groomed, well-developed  HEAD:  Atraumatic, Normocephalic  EYES: EOMI, PERRLA, conjunctiva and sclera clear  ENMT: No tonsillar erythema, exudates, or enlargement; Moist mucous membranes, Good dentition, No lesions  NECK: Supple, No JVD, Normal thyroid  CHEST/LUNG: Clear to percussion bilaterally; No rales, rhonchi, wheezing, or rubs  HEART: Regular rate and rhythm; No murmurs, rubs, or gallops  ABDOMEN: Soft, Nontender, Nondistended; Bowel sounds present;  No HSM.  no MTR.  Multiple old scars.    EXTREMITIES:  2+ Peripheral Pulses, No clubbing, cyanosis, or edema  LYMPH: No lymphadenopathy noted  SKIN: No rashes or lesions      LABS:                        9.2    11.7  )-----------( 361      ( 2019 12:48 )             28.9     06-07    140  |  106  |  26.0<H>  ----------------------------<  131<H>  4.4   |  20.0<L>  |  0.66    Ca    9.3      2019 12:48    TPro  7.0  /  Alb  4.1  /  TBili  1.0  /  DBili  x   /  AST  1003<H>  /  ALT  371<H>  /  AlkPhos  525<H>         Urinalysis Basic - ( 2019 19:24 )    Color: Yellow / Appearance: Clear / S.010 / pH: x  Gluc: x / Ketone: Negative  / Bili: Small / Urobili: 1 mg/dL   Blood: x / Protein: 30 mg/dL / Nitrite: Negative   Leuk Esterase: Trace / RBC: Negative /HPF / WBC 0-2   Sq Epi: x / Non Sq Epi: Occasional / Bacteria: x        LIVER FUNCTIONS - ( 2019 12:48 )  Alb: 4.1 g/dL / Pro: 7.0 g/dL / ALK PHOS: 525 U/L / ALT: 371 U/L / AST: 1003 U/L / GGT: x               RADIOLOGY & ADDITIONAL STUDIES:  CT scan:  Intact ileal surgical anastamosis;  Dilated CBD with stones

## 2019-06-07 NOTE — H&P ADULT - ASSESSMENT
83 year old female presented for abdominal pain and 4 day history of no bowel movement. ACS consulted for small bowel obstruction. well known to service as she had an open cholecystectomy for perforated gallbladder july 2018. Returned for small bowel obstruction one month ago and was taken for an exploratory laparotomy with small bowel resection. No complications post operatively and was discharged home. Returned with concern for small bowel obstruction, but patient had a small bowel movement in ED and is passing flatus. imaging does not demonstrate a small bowel obstruction as contrast is seen in the colon, however liver enzymes are elevated, especially transaminitis. Imaging demonstrated retained gallstone

## 2019-06-07 NOTE — H&P ADULT - HISTORY OF PRESENT ILLNESS
83 year old female brought in by daughter for 1 day history of epigastric abdominal pain that is burning in nature, nonradiating. Awoke patient from sleep earlier this morning. Associated with nausea and one episode of nonbilious nonbloody emesis. Last meal was last night without abdominal pain. Last bowel movement was 4 days ago, but patient continues to report passing flatus daily.

## 2019-06-07 NOTE — H&P ADULT - NSICDXPASTMEDICALHX_GEN_ALL_CORE_FT
PAST MEDICAL HISTORY:  Breast cancer     CAD (coronary artery disease)     Carotid stenosis     Hyperlipidemia     Hypertension     Osteoarthritis     Osteopenia     SBO (small bowel obstruction)

## 2019-06-07 NOTE — ED ADULT TRIAGE NOTE - CHIEF COMPLAINT QUOTE
Pt c/o cold sweats, constipation, and nausea as well as abdominal pain, had surgery 1 month ago for SBO, surgeon Dr Whitley, has stitches removed last week

## 2019-06-07 NOTE — ED PROVIDER NOTE - CLINICAL SUMMARY MEDICAL DECISION MAKING FREE TEXT BOX
3F with hx of Breast CA, CAD, carotid stenosis; 1 month s/p SBO and resection (at [facility], Dr Whitley) presenting w 4 days abdominal pain, distension, nausea, vomiting, decreased BM/flatus. Exam as above. High clinical suspicion for recurrence of SBO. Will check labs, CT abd/pelvis, surg consult. Currently stable.   Ruben Olvera MD, PGY2 Emergency Medicine

## 2019-06-07 NOTE — ED ADULT NURSE REASSESSMENT NOTE - NS ED NURSE REASSESS COMMENT FT1
pt ambulating with steady gait to bathroom. In no apparent distress or discomfort. will continue to monitor.

## 2019-06-07 NOTE — CONSULT NOTE ADULT - ASSESSMENT
Choledocholithiasis.  Remote Open cholecystectomy.  Recent SBO with ileal resection.  Hx of CAD with CABG and stents.  Still on Aspirin/ Plavix, Last doses on 6/6.  No evidence for biliary sepsis.  Abdominal pain and high LFT's could be from the CBD stones.  No pancreatitis or cholangitis.  Needs cardiac evaluation.  Needs to hold Aspirin / Plavix x 5 days for safe ES and stone extraction.  Suggest Hepatitis profiles and MIREYA / Ferritin.  Eventual ERCP/ES and stone extraction.  OK for Clear liquid diet for now.

## 2019-06-07 NOTE — ED PROVIDER NOTE - ATTENDING CONTRIBUTION TO CARE
84 yo F presents to ED with daughter c/o worsening abd pain with assoc constipation, N/V and abd distension x last 4 days.  Pt s/p laparotomy with bowel resection for SBO approx 4 weeks ago by Dr. Whitley.  On exam pt awake and alert In NAD, mm moist, Neck supple, Cor Reg, Lungs clear b/l, Abd soft, distended, + hypoactive BS with diffuse tenderness to palp, no rebound or rigidity.  Will check labs, CT abd/pelvis and have Surgery eval

## 2019-06-07 NOTE — H&P ADULT - ATTENDING COMMENTS
Seen and examined.  Details per resident H+P.  82 yo female known to ACS service s/p open cholecystectomy July 2018 2/2 perforated gallbladder, subsequently underwent ex-lap, SBR for bowel obstruction May 2019.  Now returns w/ 1 day h/o abdominal pain Seen and examined.  Details per resident H+P.  84 yo female known to ACS service s/p open cholecystectomy July 2018 2/2 perforated gallbladder, subsequently underwent ex-lap, SBR for bowel obstruction May 2019.  Now returns w/ 1 day h/o abdominal pain, N/V.  No fevers/chills.  Afebrile. HD normal.  WBC:11.7; PMNs: 86%.  Alk phos: 525, Tbili 1, AST/ALT: 1003/371.  NAD.  Abdomen: S/ND, TTP epigastrium, no rebound, no guarding.  CT A/P reviewed - stone in CBD.  retained CBD stone.  Admit.  NPO/IVF.  Repeat LFTs.  No evidence of cholangitis at this time, will hold off on abx for now.  GI consult for ERCP.

## 2019-06-07 NOTE — ED PROVIDER NOTE - CARE PLAN
Principal Discharge DX:	Abdominal pain  Secondary Diagnosis:	Biliary stone  Secondary Diagnosis:	Nausea and vomiting

## 2019-06-07 NOTE — ED PROVIDER NOTE - NS ED ROS FT
Please see HPI section of chart for further detailed Review of Systems    abdominal pain, abd distension, chills, nausea, vomiting, decreased BM and flatus

## 2019-06-07 NOTE — ED PROVIDER NOTE - PHYSICAL EXAMINATION
General: Well appearing, alert, oriented, no acute distress. Resting in bed.  HEENT: PERRLA EOMI. No trauma/bruising noted to head or face. No lip/tongue/throat swelling noted on exam.  CV: Regular rate and rhythm, S1/S2, no murmurs/rubs/gallops noted on exam.  Lungs: Clear to ascultation bilaterally, no wheezes/crackles/rales noted on exam.  Abdomen: Soft, +epigastric tenderness to palpation, +mildly distended, no guarding or rebound. No CVA tenderness to palpation. +Midline surgical scar well healed, no signs of infxn on exam  MSK: Full ROM of upper and lower extremities bilaterally. Full ROM of neck.   Neuro: Awake, A+O x4, moving all extremities spontaneously. CN 2-12 grossly intact. Strength and sensation grossly intact to all extremities.   Extremities: No swelling or edema noted to extremities.   Skin: No rash or bruising noted on exam.

## 2019-06-07 NOTE — ED PROVIDER NOTE - PROGRESS NOTE DETAILS
CT w/o SBO, however noted for retained biliary stone. Labs with elevated LFTs and alkphos. Will be admitted to surg service for further care  Ruben Olvera MD, PGY2 Emergency Medicine

## 2019-06-07 NOTE — ED ADULT NURSE NOTE - PSH
H/O benign breast biopsy  right, 10/1/14  S/P CABG x 3  2011  S/P laparoscopic cholecystectomy    S/P lumpectomy, right breast

## 2019-06-07 NOTE — ED ADULT NURSE NOTE - OBJECTIVE STATEMENT
pt a+ox3, reports abd pain, nausea and vomiting since early this morning. pt reports constipation x 4 days, reports tiny oz. states she is passing gas. pt a+ox3, reports abd pain, nausea and vomiting since early this morning. s/p SBO with surgical intervention x 20 days. pt reports constipation x 4 days, reports tiny oz. states she is passing gas. pt denies fever, states she has had chills because she "is scared and in pain." incision clean and dry. abd soft, tender on palpation to bl UQ and nondistended. pt in no apparent distress, MD @ bedside. will continue to monitor.

## 2019-06-07 NOTE — H&P ADULT - NSHPREVIEWOFSYSTEMS_GEN_ALL_CORE
denies fevers, chills, chest pain, shortness of breath, cough, palpitations, dysuria, blood per rectum, myalgias

## 2019-06-07 NOTE — ED PROVIDER NOTE - OBJECTIVE STATEMENT
83F with hx of Breast CA, CAD, carotid stenosis; 1 month s/p SBO and resection (at  hospital, Dr Whitley); PSHX cholecystectomy and  x 2 presents to ED c/o constipation x 4 days as well as abdominal pain, nausea, vomiting, and abd distension. Abd pain worsened last night, and patient had 1 episode vomiting this morning. Patient has had minimal bowel movements over last 4 days, and is having decreased flatus. Has been having chills x1 day, no fevers. No chest pain, shortness of breath, dizziness. No blood noted in stool, but stool noted to be very dark.     Patient had presented to  ED on 19 with worsening abdominal pain, middle of abdomen, nonradiating and sharp in nature, along with nausea, decreased BM/flatus. CT A/P at that time notable for closed loop obstruction. Patient was taken to OR on  for exlap, small bowel resection. At that time, patient was noted to have small bowel mass identified in distal ileum, resected with 8cm margin on either side, primary side to side anastomosis, small bowel ran in its entirety and no other pathology identified. Furthermore during hospital course, pt had LBBB on EKG , cards consulted.  Cards consult suggests new LBBB since 2018.  Pt had experienced CP post op once NGT was removed, with same LBBB and no trop leak.  ECHO performed without regional wall motion abnormalities. Pt taken for cardiac cath on  for unstable angina.    GI: Gutierrez  Card: Michael 83F with hx of Breast CA, CAD, carotid stenosis; 1 month s/p SBO and resection (at  hospital, Dr Whitley); PSHX cholecystectomy and  x 2 presents to ED c/o constipation x 4 days as well as abdominal pain, nausea, vomiting, and abd distension. Abd pain worsened last night, and patient had 1 episode vomiting this morning. Patient has had minimal bowel movements over last 4 days, and is having decreased flatus. Has been having chills x1 day, no fevers. No chest pain, shortness of breath, dizziness. No blood noted in stool, but stool noted to be very dark.     Patient had presented to  ED on 19 with worsening abdominal pain, middle of abdomen, nonradiating and sharp in nature, along with nausea, decreased BM/flatus. CT A/P at that time notable for closed loop obstruction. Patient was taken to OR on  for exlap, small bowel resection. At that time, patient was noted to have small bowel mass identified in distal ileum, resected with 8cm margin on either side, primary side to side anastomosis, small bowel ran in its entirety and no other pathology identified. Furthermore during hospital course, pt had LBBB on EKG , cards consulted.  Cards consult suggests new LBBB since 2018.  Pt had experienced CP post op once NGT was removed, with same LBBB and no trop leak.  ECHO performed without regional wall motion abnormalities. Pt taken for cardiac cath on  for unstable angina - no new obstructive CAD, deemed stable by Cards for d/c with f/u.     GI: Gutierrez  Card: Michael

## 2019-06-07 NOTE — H&P ADULT - NSICDXPASTSURGICALHX_GEN_ALL_CORE_FT
PAST SURGICAL HISTORY:  H/O benign breast biopsy right, 10/1/14    S/P CABG x 3 2011    S/P cholecystectomy     S/P lumpectomy, right breast     S/P small bowel resection

## 2019-06-07 NOTE — ED ADULT NURSE NOTE - NSIMPLEMENTINTERV_GEN_ALL_ED
Implemented All Universal Safety Interventions:  [place] to call system. Call bell, personal items and telephone within reach. Instruct patient to call for assistance. Room bathroom lighting operational. Non-slip footwear when patient is off stretcher. Physically safe environment: no spills, clutter or unnecessary equipment. Stretcher in lowest position, wheels locked, appropriate side rails in place.

## 2019-06-08 LAB
ALBUMIN SERPL ELPH-MCNC: 3.5 G/DL — SIGNIFICANT CHANGE UP (ref 3.3–5.2)
ALP SERPL-CCNC: 476 U/L — HIGH (ref 40–120)
ALT FLD-CCNC: 673 U/L — HIGH
ANION GAP SERPL CALC-SCNC: 11 MMOL/L — SIGNIFICANT CHANGE UP (ref 5–17)
ANISOCYTOSIS BLD QL: SLIGHT — SIGNIFICANT CHANGE UP
APTT BLD: 27.7 SEC — SIGNIFICANT CHANGE UP (ref 27.5–36.3)
AST SERPL-CCNC: 877 U/L — HIGH
BASOPHILS # BLD AUTO: 0 K/UL — SIGNIFICANT CHANGE UP (ref 0–0.2)
BASOPHILS NFR BLD AUTO: 0.3 % — SIGNIFICANT CHANGE UP (ref 0–2)
BILIRUB SERPL-MCNC: 2.3 MG/DL — HIGH (ref 0.4–2)
BUN SERPL-MCNC: 15 MG/DL — SIGNIFICANT CHANGE UP (ref 8–20)
CALCIUM SERPL-MCNC: 8.2 MG/DL — LOW (ref 8.6–10.2)
CHLORIDE SERPL-SCNC: 105 MMOL/L — SIGNIFICANT CHANGE UP (ref 98–107)
CO2 SERPL-SCNC: 21 MMOL/L — LOW (ref 22–29)
CREAT SERPL-MCNC: 0.63 MG/DL — SIGNIFICANT CHANGE UP (ref 0.5–1.3)
EOSINOPHIL # BLD AUTO: 0.3 K/UL — SIGNIFICANT CHANGE UP (ref 0–0.5)
EOSINOPHIL NFR BLD AUTO: 4.5 % — SIGNIFICANT CHANGE UP (ref 0–6)
GLUCOSE SERPL-MCNC: 130 MG/DL — HIGH (ref 70–115)
HCT VFR BLD CALC: 23.9 % — LOW (ref 37–47)
HGB BLD-MCNC: 7.8 G/DL — LOW (ref 12–16)
INR BLD: 1.13 RATIO — SIGNIFICANT CHANGE UP (ref 0.88–1.16)
LYMPHOCYTES # BLD AUTO: 1.1 K/UL — SIGNIFICANT CHANGE UP (ref 1–4.8)
LYMPHOCYTES # BLD AUTO: 14.7 % — LOW (ref 20–55)
MACROCYTES BLD QL: SLIGHT — SIGNIFICANT CHANGE UP
MAGNESIUM SERPL-MCNC: 2.1 MG/DL — SIGNIFICANT CHANGE UP (ref 1.8–2.6)
MCHC RBC-ENTMCNC: 27.4 PG — SIGNIFICANT CHANGE UP (ref 27–31)
MCHC RBC-ENTMCNC: 32.6 G/DL — SIGNIFICANT CHANGE UP (ref 32–36)
MCV RBC AUTO: 83.9 FL — SIGNIFICANT CHANGE UP (ref 81–99)
MICROCYTES BLD QL: SLIGHT — SIGNIFICANT CHANGE UP
MONOCYTES # BLD AUTO: 0.9 K/UL — HIGH (ref 0–0.8)
MONOCYTES NFR BLD AUTO: 11.5 % — HIGH (ref 3–10)
NEUTROPHILS # BLD AUTO: 5.2 K/UL — SIGNIFICANT CHANGE UP (ref 1.8–8)
NEUTROPHILS NFR BLD AUTO: 68.9 % — SIGNIFICANT CHANGE UP (ref 37–73)
OVALOCYTES BLD QL SMEAR: SLIGHT — SIGNIFICANT CHANGE UP
PHOSPHATE SERPL-MCNC: 2.3 MG/DL — LOW (ref 2.4–4.7)
PLAT MORPH BLD: NORMAL — SIGNIFICANT CHANGE UP
PLATELET # BLD AUTO: 307 K/UL — SIGNIFICANT CHANGE UP (ref 150–400)
POIKILOCYTOSIS BLD QL AUTO: SLIGHT — SIGNIFICANT CHANGE UP
POTASSIUM SERPL-MCNC: 3.6 MMOL/L — SIGNIFICANT CHANGE UP (ref 3.5–5.3)
POTASSIUM SERPL-SCNC: 3.6 MMOL/L — SIGNIFICANT CHANGE UP (ref 3.5–5.3)
PROT SERPL-MCNC: 5.6 G/DL — LOW (ref 6.6–8.7)
PROTHROM AB SERPL-ACNC: 13.1 SEC — HIGH (ref 10–12.9)
RBC # BLD: 2.85 M/UL — LOW (ref 4.4–5.2)
RBC # FLD: 14.2 % — SIGNIFICANT CHANGE UP (ref 11–15.6)
RBC BLD AUTO: ABNORMAL
SODIUM SERPL-SCNC: 137 MMOL/L — SIGNIFICANT CHANGE UP (ref 135–145)
WBC # BLD: 7.6 K/UL — SIGNIFICANT CHANGE UP (ref 4.8–10.8)
WBC # FLD AUTO: 7.6 K/UL — SIGNIFICANT CHANGE UP (ref 4.8–10.8)

## 2019-06-08 PROCEDURE — 99232 SBSQ HOSP IP/OBS MODERATE 35: CPT

## 2019-06-08 PROCEDURE — 93010 ELECTROCARDIOGRAM REPORT: CPT

## 2019-06-08 RX ORDER — ENOXAPARIN SODIUM 100 MG/ML
40 INJECTION SUBCUTANEOUS DAILY
Refills: 0 | Status: DISCONTINUED | OUTPATIENT
Start: 2019-06-08 | End: 2019-06-10

## 2019-06-08 RX ORDER — POTASSIUM CHLORIDE 20 MEQ
40 PACKET (EA) ORAL ONCE
Refills: 0 | Status: COMPLETED | OUTPATIENT
Start: 2019-06-08 | End: 2019-06-08

## 2019-06-08 RX ORDER — SODIUM,POTASSIUM PHOSPHATES 278-250MG
1 POWDER IN PACKET (EA) ORAL
Refills: 0 | Status: DISCONTINUED | OUTPATIENT
Start: 2019-06-08 | End: 2019-06-10

## 2019-06-08 RX ORDER — ACETAMINOPHEN 500 MG
650 TABLET ORAL ONCE
Refills: 0 | Status: COMPLETED | OUTPATIENT
Start: 2019-06-08 | End: 2019-06-08

## 2019-06-08 RX ADMIN — Medication 1 PACKET(S): at 18:06

## 2019-06-08 RX ADMIN — SODIUM CHLORIDE 100 MILLILITER(S): 9 INJECTION, SOLUTION INTRAVENOUS at 18:05

## 2019-06-08 RX ADMIN — Medication 650 MILLIGRAM(S): at 08:33

## 2019-06-08 RX ADMIN — ATORVASTATIN CALCIUM 10 MILLIGRAM(S): 80 TABLET, FILM COATED ORAL at 22:08

## 2019-06-08 RX ADMIN — Medication 1 PACKET(S): at 12:43

## 2019-06-08 RX ADMIN — Medication 650 MILLIGRAM(S): at 09:30

## 2019-06-08 RX ADMIN — Medication 40 MILLIEQUIVALENT(S): at 12:43

## 2019-06-08 RX ADMIN — ENOXAPARIN SODIUM 40 MILLIGRAM(S): 100 INJECTION SUBCUTANEOUS at 12:43

## 2019-06-08 RX ADMIN — PANTOPRAZOLE SODIUM 40 MILLIGRAM(S): 20 TABLET, DELAYED RELEASE ORAL at 05:12

## 2019-06-08 NOTE — PROGRESS NOTE ADULT - SUBJECTIVE AND OBJECTIVE BOX
Patient seen and examined; chart reviewed.  Feels good.  No abdominal pain.  Tolerates Clear liquid diet.  No fever.  Not toxic.  Aspirin / Plavix on hold.      PAST MEDICAL & SURGICAL HISTORY:  SBO (small bowel obstruction)  Osteoarthritis  Osteopenia  Breast cancer  Hyperlipidemia  Hypertension  Carotid stenosis  CAD (coronary artery disease)  S/P small bowel resection  S/P cholecystectomy  H/O benign breast biopsy: right, 10/1/14  S/P lumpectomy, right breast  S/P CABG x 3:       ROS:  No Heartburn, regurgitation, dysphagia, odynophagia.  No dyspepsia  No abdominal pain.    No Nausea, vomiting.  No Bleeding.  No hematemesis.   No diarrhea.    No hematochesia.  No weight loss, anorexia.  No edema.      MEDICATIONS  (STANDING):  ALBUTerol    90 MICROgram(s) HFA Inhaler 1 Puff(s) Inhalation every 4 hours  atorvastatin 10 milliGRAM(s) Oral at bedtime  diltiazem    milliGRAM(s) Oral daily  enoxaparin Injectable 40 milliGRAM(s) SubCutaneous daily  lactated ringers. 1000 milliLiter(s) (100 mL/Hr) IV Continuous <Continuous>  lisinopril Oral Tab/Cap - Peds 2.5 milliGRAM(s) Oral daily  metoprolol succinate ER 25 milliGRAM(s) Oral daily  ondansetron Injectable 4 milliGRAM(s) IV Push once  pantoprazole    Tablet 40 milliGRAM(s) Oral before breakfast  potassium phosphate / sodium phosphate powder 1 Packet(s) Oral three times a day before meals  tiotropium 18 MICROgram(s) Capsule 1 Capsule(s) Inhalation daily    MEDICATIONS  (PRN):  ibuprofen  Tablet. 400 milliGRAM(s) Oral every 6 hours PRN Temp greater or equal to 38.5C (101.3F)  ondansetron Injectable 4 milliGRAM(s) IV Push every 6 hours PRN Nausea      Allergies    No Known Allergies    Intolerances        Vital Signs Last 24 Hrs  T(C): 36.8 (2019 08:30), Max: 37.4 (2019 19:40)  T(F): 98.2 (2019 08:30), Max: 99.3 (2019 19:40)  HR: 78 (2019 08:30) (69 - 89)  BP: 103/58 (2019 08:30) (94/54 - 121/56)  BP(mean): 78 (2019 18:01) (78 - 78)  RR: 18 (2019 08:30) (18 - 20)  SpO2: 96% (2019 00:45) (96% - 99%)    PHYSICAL EXAM:    GENERAL: NAD, well-groomed, well-developed  HEAD:  Atraumatic, Normocephalic  EYES: EOMI, PERRLA, conjunctiva and sclera clear  ENMT: No tonsillar erythema, exudates, or enlargement; Moist mucous membranes, Good dentition, No lesions  NECK: Supple, No JVD, Normal thyroid  CHEST/LUNG: Clear to percussion bilaterally; No rales, rhonchi, wheezing, or rubs  HEART: Regular rate and rhythm; No murmurs, rubs, or gallops  ABDOMEN: Soft, Nontender, Nondistended; Bowel sounds present  EXTREMITIES:  2+ Peripheral Pulses, No clubbing, cyanosis, or edema  LYMPH: No lymphadenopathy noted  SKIN: No rashes or lesions      LABS:                        7.8    7.6   )-----------( 307      ( 2019 09:30 )             23.9     06-08    137  |  105  |  15.0  ----------------------------<  130<H>  3.6   |  21.0<L>  |  0.63    Ca    8.2<L>      2019 09:30  Phos  2.3     06-08  Mg     2.1     06-08    TPro  5.6<L>  /  Alb  3.5  /  TBili  2.3<H>  /  DBili  x   /  AST  877<H>  /  ALT  673<H>  /  AlkPhos  476<H>  06-08    PT/INR - ( 2019 09:30 )   PT: 13.1 sec;   INR: 1.13 ratio         PTT - ( 2019 09:30 )  PTT:27.7 sec   Urinalysis Basic - ( 2019 19:24 )    Color: Yellow / Appearance: Clear / S.010 / pH: x  Gluc: x / Ketone: Negative  / Bili: Small / Urobili: 1 mg/dL   Blood: x / Protein: 30 mg/dL / Nitrite: Negative   Leuk Esterase: Trace / RBC: Negative /HPF / WBC 0-2   Sq Epi: x / Non Sq Epi: Occasional / Bacteria: x          RADIOLOGY & ADDITIONAL STUDIES:

## 2019-06-08 NOTE — PROGRESS NOTE ADULT - ASSESSMENT
82yo F w retained CBD stones with downtrending liver enzymes.   -Hold ASA/Plavix until Monday for ERCP with GI  -CAROLIN  -NPO after midnight on Sunday  -OOB

## 2019-06-08 NOTE — PROGRESS NOTE ADULT - ASSESSMENT
CBD stone and dilated CBD.  S/p jacqueline.  LFT's better,  Pain free.  No fever. Not toxic.    Will advance diet to DASH, CHO restricted.  Follow labs.

## 2019-06-08 NOTE — PROGRESS NOTE ADULT - SUBJECTIVE AND OBJECTIVE BOX
82yo F examined at bedside and found in no distress. No overnight events. No active complaints. Having BM. Pain is well controlled. Denies pain, fever, chills, nausea, vomiting, diarrhea, constipation, chest pain, and sob.     Vital Signs Last 24 Hrs  T(C): 36.8 (2019 08:30), Max: 37.4 (2019 19:40)  T(F): 98.2 (2019 08:30), Max: 99.3 (2019 19:40)  HR: 78 (2019 08:30) (69 - 89)  BP: 103/58 (2019 08:30) (94/54 - 121/56)  BP(mean): 78 (2019 18:01) (78 - 78)  RR: 18 (2019 08:30) (18 - 20)  SpO2: 96% (2019 00:45) (96% - 99%)    I&O's Detail    2019 07:01  -  2019 07:00  --------------------------------------------------------  IN:    lactated ringers.: 600 mL  Total IN: 600 mL    OUT:  Total OUT: 0 mL    Total NET: 600 mL          Ins - IVF type & rate, TNP, PO, TUbe feeds type and rate   Outs - Urine, BM, Drains, NG tube, Chest tube (amount & kind) STAN#1 right put out 75cc serosang fluid    Constitutional: patient resting comfortably in bed, in no acute distress  HEENT: EOMI / PERRLA, MMM  Respiratory: non labored breathing.   Cardiovascular: RRR  Gastrointestinal: Abdomen soft, non-tender, non-distended, no rebound tenderness / guarding      LABS:                        7.8    7.6   )-----------( 307      ( 2019 09:30 )             23.9     06-08    137  |  105  |  15.0  ----------------------------<  130<H>  3.6   |  21.0<L>  |  0.63    Ca    8.2<L>      2019 09:30  Phos  2.3     06-08  Mg     2.1     06-08    TPro  5.6<L>  /  Alb  3.5  /  TBili  2.3<H>  /  DBili  x   /  AST  877<H>  /  ALT  673<H>  /  AlkPhos  476<H>  -    PT/INR - ( 2019 09:30 )   PT: 13.1 sec;   INR: 1.13 ratio         PTT - ( 2019 09:30 )  PTT:27.7 sec  Urinalysis Basic - ( 2019 19:24 )    Color: Yellow / Appearance: Clear / S.010 / pH: x  Gluc: x / Ketone: Negative  / Bili: Small / Urobili: 1 mg/dL   Blood: x / Protein: 30 mg/dL / Nitrite: Negative   Leuk Esterase: Trace / RBC: Negative /HPF / WBC 0-2   Sq Epi: x / Non Sq Epi: Occasional / Bacteria: x        MEDICATIONS  (STANDING):  ALBUTerol    90 MICROgram(s) HFA Inhaler 1 Puff(s) Inhalation every 4 hours  atorvastatin 10 milliGRAM(s) Oral at bedtime  diltiazem    milliGRAM(s) Oral daily  enoxaparin Injectable 40 milliGRAM(s) SubCutaneous daily  lactated ringers. 1000 milliLiter(s) (100 mL/Hr) IV Continuous <Continuous>  lisinopril Oral Tab/Cap - Peds 2.5 milliGRAM(s) Oral daily  metoprolol succinate ER 25 milliGRAM(s) Oral daily  ondansetron Injectable 4 milliGRAM(s) IV Push once  pantoprazole    Tablet 40 milliGRAM(s) Oral before breakfast  potassium phosphate / sodium phosphate powder 1 Packet(s) Oral three times a day before meals  tiotropium 18 MICROgram(s) Capsule 1 Capsule(s) Inhalation daily    MEDICATIONS  (PRN):  ibuprofen  Tablet. 400 milliGRAM(s) Oral every 6 hours PRN Temp greater or equal to 38.5C (101.3F)  ondansetron Injectable 4 milliGRAM(s) IV Push every 6 hours PRN Nausea      MICRO:   Cultures     STUDIES:   EKG, CXR, U/S, CT, MRI

## 2019-06-09 ENCOUNTER — TRANSCRIPTION ENCOUNTER (OUTPATIENT)
Age: 84
End: 2019-06-09

## 2019-06-09 LAB
ALBUMIN SERPL ELPH-MCNC: 3.4 G/DL — SIGNIFICANT CHANGE UP (ref 3.3–5.2)
ALP SERPL-CCNC: 461 U/L — HIGH (ref 40–120)
ALT FLD-CCNC: 473 U/L — HIGH
ANION GAP SERPL CALC-SCNC: 11 MMOL/L — SIGNIFICANT CHANGE UP (ref 5–17)
AST SERPL-CCNC: 403 U/L — HIGH
BASOPHILS # BLD AUTO: 0 K/UL — SIGNIFICANT CHANGE UP (ref 0–0.2)
BASOPHILS NFR BLD AUTO: 0.6 % — SIGNIFICANT CHANGE UP (ref 0–2)
BILIRUB DIRECT SERPL-MCNC: 0.9 MG/DL — HIGH (ref 0–0.3)
BILIRUB INDIRECT FLD-MCNC: 0.5 MG/DL — SIGNIFICANT CHANGE UP (ref 0.2–1)
BILIRUB SERPL-MCNC: 1.4 MG/DL — SIGNIFICANT CHANGE UP (ref 0.4–2)
BUN SERPL-MCNC: 9 MG/DL — SIGNIFICANT CHANGE UP (ref 8–20)
CALCIUM SERPL-MCNC: 9 MG/DL — SIGNIFICANT CHANGE UP (ref 8.6–10.2)
CHLORIDE SERPL-SCNC: 108 MMOL/L — HIGH (ref 98–107)
CO2 SERPL-SCNC: 23 MMOL/L — SIGNIFICANT CHANGE UP (ref 22–29)
CREAT SERPL-MCNC: 0.66 MG/DL — SIGNIFICANT CHANGE UP (ref 0.5–1.3)
CULTURE RESULTS: SIGNIFICANT CHANGE UP
EOSINOPHIL # BLD AUTO: 0.4 K/UL — SIGNIFICANT CHANGE UP (ref 0–0.5)
EOSINOPHIL NFR BLD AUTO: 6.7 % — HIGH (ref 0–6)
GLUCOSE SERPL-MCNC: 83 MG/DL — SIGNIFICANT CHANGE UP (ref 70–115)
HCT VFR BLD CALC: 27 % — LOW (ref 37–47)
HGB BLD-MCNC: 8.5 G/DL — LOW (ref 12–16)
LYMPHOCYTES # BLD AUTO: 1.4 K/UL — SIGNIFICANT CHANGE UP (ref 1–4.8)
LYMPHOCYTES # BLD AUTO: 22 % — SIGNIFICANT CHANGE UP (ref 20–55)
MAGNESIUM SERPL-MCNC: 2 MG/DL — SIGNIFICANT CHANGE UP (ref 1.6–2.6)
MCHC RBC-ENTMCNC: 26.9 PG — LOW (ref 27–31)
MCHC RBC-ENTMCNC: 31.5 G/DL — LOW (ref 32–36)
MCV RBC AUTO: 85.4 FL — SIGNIFICANT CHANGE UP (ref 81–99)
MONOCYTES # BLD AUTO: 0.6 K/UL — SIGNIFICANT CHANGE UP (ref 0–0.8)
MONOCYTES NFR BLD AUTO: 9 % — SIGNIFICANT CHANGE UP (ref 3–10)
NEUTROPHILS # BLD AUTO: 3.9 K/UL — SIGNIFICANT CHANGE UP (ref 1.8–8)
NEUTROPHILS NFR BLD AUTO: 61.2 % — SIGNIFICANT CHANGE UP (ref 37–73)
PHOSPHATE SERPL-MCNC: 3.4 MG/DL — SIGNIFICANT CHANGE UP (ref 2.4–4.7)
PLATELET # BLD AUTO: 303 K/UL — SIGNIFICANT CHANGE UP (ref 150–400)
POTASSIUM SERPL-MCNC: 4.1 MMOL/L — SIGNIFICANT CHANGE UP (ref 3.5–5.3)
POTASSIUM SERPL-SCNC: 4.1 MMOL/L — SIGNIFICANT CHANGE UP (ref 3.5–5.3)
PROT SERPL-MCNC: 6.1 G/DL — LOW (ref 6.6–8.7)
RBC # BLD: 3.16 M/UL — LOW (ref 4.4–5.2)
RBC # FLD: 14.5 % — SIGNIFICANT CHANGE UP (ref 11–15.6)
SODIUM SERPL-SCNC: 142 MMOL/L — SIGNIFICANT CHANGE UP (ref 135–145)
SPECIMEN SOURCE: SIGNIFICANT CHANGE UP
WBC # BLD: 6.4 K/UL — SIGNIFICANT CHANGE UP (ref 4.8–10.8)
WBC # FLD AUTO: 6.4 K/UL — SIGNIFICANT CHANGE UP (ref 4.8–10.8)

## 2019-06-09 PROCEDURE — 99233 SBSQ HOSP IP/OBS HIGH 50: CPT

## 2019-06-09 RX ORDER — ACETAMINOPHEN 500 MG
650 TABLET ORAL ONCE
Refills: 0 | Status: COMPLETED | OUTPATIENT
Start: 2019-06-09 | End: 2019-06-09

## 2019-06-09 RX ADMIN — LISINOPRIL 2.5 MILLIGRAM(S): 2.5 TABLET ORAL at 06:19

## 2019-06-09 RX ADMIN — Medication 120 MILLIGRAM(S): at 06:19

## 2019-06-09 RX ADMIN — Medication 1 PACKET(S): at 12:08

## 2019-06-09 RX ADMIN — Medication 25 MILLIGRAM(S): at 06:20

## 2019-06-09 RX ADMIN — ATORVASTATIN CALCIUM 10 MILLIGRAM(S): 80 TABLET, FILM COATED ORAL at 22:04

## 2019-06-09 RX ADMIN — ENOXAPARIN SODIUM 40 MILLIGRAM(S): 100 INJECTION SUBCUTANEOUS at 12:08

## 2019-06-09 RX ADMIN — Medication 1 PACKET(S): at 17:34

## 2019-06-09 RX ADMIN — Medication 650 MILLIGRAM(S): at 18:28

## 2019-06-09 RX ADMIN — SODIUM CHLORIDE 100 MILLILITER(S): 9 INJECTION, SOLUTION INTRAVENOUS at 17:34

## 2019-06-09 RX ADMIN — PANTOPRAZOLE SODIUM 40 MILLIGRAM(S): 20 TABLET, DELAYED RELEASE ORAL at 06:20

## 2019-06-09 RX ADMIN — Medication 1 PACKET(S): at 06:20

## 2019-06-09 NOTE — DISCHARGE NOTE PROVIDER - NSDCFUADDAPPT_GEN_ALL_CORE_FT
Please call office of Dr. Bermudez to confirm appointment for ERCP and where to go.  After undergoing ERCP procedure, please call Acute Care Surgery clinic for follow up appointment.

## 2019-06-09 NOTE — DISCHARGE NOTE PROVIDER - NSFOLLOWUPCLINICS_GEN_ALL_ED_FT
[facility] Acute Care Surgery  Acute Care Surgery  [address] [place] [id number]  Phone: [phone]  Fax:   Follow Up Time:

## 2019-06-09 NOTE — PROGRESS NOTE ADULT - SUBJECTIVE AND OBJECTIVE BOX
SUBJECTIVE:  No overnight events. No active complaints. Having BM. Pain is well controlled. Tolerating clear liquid diet. Denies pain, fever, chills, nausea, vomiting, diarrhea, constipation, chest pain, and sob.     MEDICATIONS  (STANDING):  ALBUTerol    90 MICROgram(s) HFA Inhaler 1 Puff(s) Inhalation every 4 hours  atorvastatin 10 milliGRAM(s) Oral at bedtime  diltiazem    milliGRAM(s) Oral daily  enoxaparin Injectable 40 milliGRAM(s) SubCutaneous daily  lactated ringers. 1000 milliLiter(s) (100 mL/Hr) IV Continuous <Continuous>  lisinopril Oral Tab/Cap - Peds 2.5 milliGRAM(s) Oral daily  metoprolol succinate ER 25 milliGRAM(s) Oral daily  ondansetron Injectable 4 milliGRAM(s) IV Push once  pantoprazole    Tablet 40 milliGRAM(s) Oral before breakfast  potassium phosphate / sodium phosphate powder 1 Packet(s) Oral three times a day before meals  tiotropium 18 MICROgram(s) Capsule 1 Capsule(s) Inhalation daily    MEDICATIONS  (PRN):  ibuprofen  Tablet. 400 milliGRAM(s) Oral every 6 hours PRN Temp greater or equal to 38.5C (101.3F)  ondansetron Injectable 4 milliGRAM(s) IV Push every 6 hours PRN Nausea      Vital Signs Last 24 Hrs  T(C): 36.9 (2019 08:14), Max: 36.9 (2019 08:14)  T(F): 98.4 (2019 08:14), Max: 98.4 (2019 08:14)  HR: 78 (2019 08:14) (33 - 78)  BP: 109/71 (2019 08:14) (109/71 - 132/51)  BP(mean): --  RR: 18 (2019 08:14) (18 - 19)  SpO2: 98% (2019 16:24) (98% - 98%)    PE  Constitutional: patient resting comfortably in bed, in no acute distress  HEENT: EOMI / PERRLA, MMM  Respiratory: non labored breathing.   Cardiovascular: RRR  Gastrointestinal: Abdomen soft, non-tender, non-distended, no rebound tenderness / guarding  Ext: Moving all 4 extremities      I&O's Detail      LABS:                        8.5    6.4   )-----------( 303      ( 2019 08:34 )             27.0     06-    142  |  108<H>  |  9.0  ----------------------------<  83  4.1   |  23.0  |  0.66    Ca    9.0      2019 08:34  Phos  3.4     -  Mg     2.0         TPro  6.1<L>  /  Alb  3.4  /  TBili  1.4  /  DBili  0.9<H>  /  AST  403<H>  /  ALT  473<H>  /  AlkPhos  461<H>      PT/INR - ( 2019 09:30 )   PT: 13.1 sec;   INR: 1.13 ratio         PTT - ( 2019 09:30 )  PTT:27.7 sec  Urinalysis Basic - ( 2019 19:24 )    Color: Yellow / Appearance: Clear / S.010 / pH: x  Gluc: x / Ketone: Negative  / Bili: Small / Urobili: 1 mg/dL   Blood: x / Protein: 30 mg/dL / Nitrite: Negative   Leuk Esterase: Trace / RBC: Negative /HPF / WBC 0-2   Sq Epi: x / Non Sq Epi: Occasional / Bacteria: x        RADIOLOGY & ADDITIONAL STUDIES: SUBJECTIVE:  No overnight events. No active complaints. Having BM. Pain is well controlled. Tolerating diet. Denies pain, fever, chills, nausea, vomiting, diarrhea, constipation, chest pain, and sob.     MEDICATIONS  (STANDING):  ALBUTerol    90 MICROgram(s) HFA Inhaler 1 Puff(s) Inhalation every 4 hours  atorvastatin 10 milliGRAM(s) Oral at bedtime  diltiazem    milliGRAM(s) Oral daily  enoxaparin Injectable 40 milliGRAM(s) SubCutaneous daily  lactated ringers. 1000 milliLiter(s) (100 mL/Hr) IV Continuous <Continuous>  lisinopril Oral Tab/Cap - Peds 2.5 milliGRAM(s) Oral daily  metoprolol succinate ER 25 milliGRAM(s) Oral daily  ondansetron Injectable 4 milliGRAM(s) IV Push once  pantoprazole    Tablet 40 milliGRAM(s) Oral before breakfast  potassium phosphate / sodium phosphate powder 1 Packet(s) Oral three times a day before meals  tiotropium 18 MICROgram(s) Capsule 1 Capsule(s) Inhalation daily    MEDICATIONS  (PRN):  ibuprofen  Tablet. 400 milliGRAM(s) Oral every 6 hours PRN Temp greater or equal to 38.5C (101.3F)  ondansetron Injectable 4 milliGRAM(s) IV Push every 6 hours PRN Nausea      Vital Signs Last 24 Hrs  T(C): 36.9 (2019 08:14), Max: 36.9 (2019 08:14)  T(F): 98.4 (2019 08:14), Max: 98.4 (2019 08:14)  HR: 78 (2019 08:14) (33 - 78)  BP: 109/71 (2019 08:14) (109/71 - 132/51)  BP(mean): --  RR: 18 (2019 08:14) (18 - 19)  SpO2: 98% (2019 16:24) (98% - 98%)    PE  Constitutional: patient resting comfortably in bed, in no acute distress  HEENT: EOMI / PERRLA, MMM  Respiratory: non labored breathing.   Cardiovascular: RRR  Gastrointestinal: Abdomen soft, non-tender, non-distended, no rebound tenderness / guarding  Ext: Moving all 4 extremities      I&O's Detail      LABS:                        8.5    6.4   )-----------( 303      ( 2019 08:34 )             27.0     06-    142  |  108<H>  |  9.0  ----------------------------<  83  4.1   |  23.0  |  0.66    Ca    9.0      2019 08:34  Phos  3.4     06-  Mg     2.0         TPro  6.1<L>  /  Alb  3.4  /  TBili  1.4  /  DBili  0.9<H>  /  AST  403<H>  /  ALT  473<H>  /  AlkPhos  461<H>      PT/INR - ( 2019 09:30 )   PT: 13.1 sec;   INR: 1.13 ratio         PTT - ( 2019 09:30 )  PTT:27.7 sec  Urinalysis Basic - ( 2019 19:24 )    Color: Yellow / Appearance: Clear / S.010 / pH: x  Gluc: x / Ketone: Negative  / Bili: Small / Urobili: 1 mg/dL   Blood: x / Protein: 30 mg/dL / Nitrite: Negative   Leuk Esterase: Trace / RBC: Negative /HPF / WBC 0-2   Sq Epi: x / Non Sq Epi: Occasional / Bacteria: x        RADIOLOGY & ADDITIONAL STUDIES:

## 2019-06-09 NOTE — DISCHARGE NOTE PROVIDER - HOSPITAL COURSE
83 year old female h/o of open cholecystectomy in July 2018 after perforated gallbladder  and s/p SBR one month ago after episode of SBO brought in by daughter for 1 day history of epigastric abdominal pain that is burning in nature, nonradiating. Awoke patient from sleep morning before coming to ED. Associated with nausea and one episode of nonbilious nonbloody emesis. Last meal was night before admission without abdominal pain. Last bowel movement was 4 days before admission, but patient continues to report passing flatus daily.   Later that day, patient had a small bowel movement in ED and was passing flatus. Imaging did not  demonstrate a small bowel obstruction as contrast is seen in the colon, however liver enzymes are elevated, especially transaminitis. Imaging demonstrated retained gallstone in CBD.  No fevers or jaundice, and patient did not suffer from cholangitis.  Patient was admitted for retained choledocholithiasis and GI was consulted.  GI agreed for eventual ERCP.  The following day, patient's LFTs downtrended which suggested passed stone.  At this point, pt's diet was advanced to regular.  Today, patient is tolerating regular diet with no complaints and having BMs.  At this point, GI recommended patient can have ERCP as outpatient.  Patient was deemed stable for discharge and will f/u with ERCP as outpatient. 83 year old female h/o of open cholecystectomy in July 2018 after perforated gallbladder  and s/p SBR one month ago after episode of SBO brought in by daughter for 1 day history of epigastric abdominal pain that is burning in nature, nonradiating. Awoke patient from sleep morning before coming to ED. Associated with nausea and one episode of nonbilious nonbloody emesis. Last meal was night before admission without abdominal pain. Last bowel movement was 4 days before admission, but patient continues to report passing flatus daily.   Later that day, patient had a small bowel movement in ED and was passing flatus. Imaging did not  demonstrate a small bowel obstruction as contrast is seen in the colon, however liver enzymes are elevated, especially transaminitis. Imaging demonstrated retained gallstone in CBD.  No fevers or jaundice, and patient did not suffer from cholangitis.  Patient was admitted for retained choledocholithiasis and GI was consulted.  GI agreed for eventual ERCP.  The following day, patient's LFTs downtrended which suggested passed stone.  At this point, pt's diet was advanced to regular.  Today, patient is tolerating regular diet with no complaints and having BMs.  At this point, GI recommended patient can have ERCP as outpatient.  Patient was deemed stable for discharge and will f/u with ERCP as outpatient.]

## 2019-06-09 NOTE — PROGRESS NOTE ADULT - ASSESSMENT
84yo F w retained CBD stones with downtrending liver enzymes    -Hold ASA/Plavix until Monday for ERCP with GI  -CAROLIN  -NPO after midnight on Sunday/preop  -OOB  -Continue to trend LFTs 82yo F w retained CBD stones with downtrending liver enzymes    -GI planning ERCP for Thursday, Will discuss DC home and OP ERCP with Attending  -CAROLIN  -OOB  -Continue to trend LFTs  -Continue diet

## 2019-06-09 NOTE — PROGRESS NOTE ADULT - SUBJECTIVE AND OBJECTIVE BOX
Patient seen and examined. chart reviewed.  Ct reviewed with Dr Fournier.  2 small calcified CBD stones in a slightly dilated CBD.  No pancreatitis.  S/p cholecystectomy.    Tolerated diet, low fat, DASH.  No recurrent pain.  Day 3 off of aspirin and plavix.  Remains asymptomatic.        PAST MEDICAL & SURGICAL HISTORY:  SBO (small bowel obstruction)  Osteoarthritis  Osteopenia  Breast cancer  Hyperlipidemia  Hypertension  Carotid stenosis  CAD (coronary artery disease)  S/P small bowel resection  S/P cholecystectomy  H/O benign breast biopsy: right, 10/1/14  S/P lumpectomy, right breast  S/P CABG x 3:       ROS:  No Heartburn, regurgitation, dysphagia, odynophagia.  No dyspepsia  No abdominal pain.    No Nausea, vomiting.  No Bleeding.  No hematemesis.   No diarrhea.    No hematochesia.  No weight loss, anorexia.  No edema.      MEDICATIONS  (STANDING):  ALBUTerol    90 MICROgram(s) HFA Inhaler 1 Puff(s) Inhalation every 4 hours  atorvastatin 10 milliGRAM(s) Oral at bedtime  diltiazem    milliGRAM(s) Oral daily  enoxaparin Injectable 40 milliGRAM(s) SubCutaneous daily  lactated ringers. 1000 milliLiter(s) (100 mL/Hr) IV Continuous <Continuous>  lisinopril Oral Tab/Cap - Peds 2.5 milliGRAM(s) Oral daily  metoprolol succinate ER 25 milliGRAM(s) Oral daily  ondansetron Injectable 4 milliGRAM(s) IV Push once  pantoprazole    Tablet 40 milliGRAM(s) Oral before breakfast  potassium phosphate / sodium phosphate powder 1 Packet(s) Oral three times a day before meals  tiotropium 18 MICROgram(s) Capsule 1 Capsule(s) Inhalation daily    MEDICATIONS  (PRN):  ibuprofen  Tablet. 400 milliGRAM(s) Oral every 6 hours PRN Temp greater or equal to 38.5C (101.3F)  ondansetron Injectable 4 milliGRAM(s) IV Push every 6 hours PRN Nausea      Allergies    No Known Allergies    Intolerances        Vital Signs Last 24 Hrs  T(C): 36.9 (2019 08:14), Max: 36.9 (2019 08:14)  T(F): 98.4 (2019 08:14), Max: 98.4 (2019 08:14)  HR: 78 (2019 08:14) (33 - 78)  BP: 109/71 (2019 08:14) (109/71 - 132/51)  BP(mean): --  RR: 18 (2019 08:14) (18 - 19)  SpO2: 98% (2019 16:24) (98% - 98%)    PHYSICAL EXAM:    GENERAL: NAD, well-groomed, well-developed  HEAD:  Atraumatic, Normocephalic  EYES: EOMI, PERRLA, conjunctiva and sclera clear  ENMT: No tonsillar erythema, exudates, or enlargement; Moist mucous membranes, Good dentition, No lesions  NECK: Supple, No JVD, Normal thyroid  CHEST/LUNG: Clear to percussion bilaterally; No rales, rhonchi, wheezing, or rubs  HEART: Regular rate and rhythm; No murmurs, rubs, or gallops  ABDOMEN: Soft, Nontender, Nondistended; Bowel sounds present  EXTREMITIES:  2+ Peripheral Pulses, No clubbing, cyanosis, or edema  LYMPH: No lymphadenopathy noted  SKIN: No rashes or lesions      LABS:                        8.5    6.4   )-----------( 303      ( 2019 08:34 )             27.0     06-09    142  |  108<H>  |  9.0  ----------------------------<  83  4.1   |  23.0  |  0.66    Ca    9.0      2019 08:34  Phos  3.4     06-09  Mg     2.0     06-09    TPro  6.1<L>  /  Alb  3.4  /  TBili  1.4  /  DBili  0.9<H>  /  AST  403<H>  /  ALT  473<H>  /  AlkPhos  461<H>  06-09    PT/INR - ( 2019 09:30 )   PT: 13.1 sec;   INR: 1.13 ratio         PTT - ( 2019 09:30 )  PTT:27.7 sec   Urinalysis Basic - ( 2019 19:24 )    Color: Yellow / Appearance: Clear / S.010 / pH: x  Gluc: x / Ketone: Negative  / Bili: Small / Urobili: 1 mg/dL   Blood: x / Protein: 30 mg/dL / Nitrite: Negative   Leuk Esterase: Trace / RBC: Negative /HPF / WBC 0-2   Sq Epi: x / Non Sq Epi: Occasional / Bacteria: x          RADIOLOGY & ADDITIONAL STUDIES:

## 2019-06-09 NOTE — DISCHARGE NOTE PROVIDER - CARE PROVIDER_API CALL
Jayme Bermudez (MD)  Gastroenterology; Internal Medicine  [address] [facility] [facility], [place] [address]  Phone: [phone]  Fax: [phone]  Follow Up Time:

## 2019-06-09 NOTE — DISCHARGE NOTE PROVIDER - NSDCCPCAREPLAN_GEN_ALL_CORE_FT
PRINCIPAL DISCHARGE DIAGNOSIS  Diagnosis: Biliary stone  Assessment and Plan of Treatment: Biliary stone      SECONDARY DISCHARGE DIAGNOSES  Diagnosis: Nausea and vomiting  Assessment and Plan of Treatment:     Diagnosis: Biliary stone  Assessment and Plan of Treatment:

## 2019-06-09 NOTE — PROGRESS NOTE ADULT - ASSESSMENT
CBD stones, 2.  LFT's are downtrending.  Bili back to normal.  s/p jacqueline.  Day 3 off of Aspirin and plavix.    After discussion with Dr Petty and Dr Raman, ERCP arranged as outpatient for 7:30 on Thursday 6/13.    Daughter at bedside with good understanding and details of procedure reviewed, including risk for Bleeding, perforation, pancreatitis.    Family agrees to discharge and procedure for later thiss week.  Advised to take AM doses of BP meds on am of procedure. Pt to remain off of Aspirin/ Plavix through the procedure.   Arrangements made.    OK for DC home later today.

## 2019-06-09 NOTE — PROGRESS NOTE ADULT - ATTENDING COMMENTS
No new issues.  Abdominal pain resolved.  Tolerated regular diet.  Abdomen: S/ND, minimal TTP epiagstrium.  ASA and plavix held in anticipation of ERCP which will be done Thursday as outpatient by SAROJ Montoya for d/c tomorrow.

## 2019-06-10 ENCOUNTER — TRANSCRIPTION ENCOUNTER (OUTPATIENT)
Age: 84
End: 2019-06-10

## 2019-06-10 VITALS
SYSTOLIC BLOOD PRESSURE: 136 MMHG | RESPIRATION RATE: 18 BRPM | TEMPERATURE: 98 F | HEART RATE: 77 BPM | OXYGEN SATURATION: 95 % | DIASTOLIC BLOOD PRESSURE: 72 MMHG

## 2019-06-10 LAB
ALBUMIN SERPL ELPH-MCNC: 3.4 G/DL — SIGNIFICANT CHANGE UP (ref 3.3–5.2)
ALP SERPL-CCNC: 447 U/L — HIGH (ref 40–120)
ALT FLD-CCNC: 347 U/L — HIGH
ANION GAP SERPL CALC-SCNC: 11 MMOL/L — SIGNIFICANT CHANGE UP (ref 5–17)
AST SERPL-CCNC: 230 U/L — HIGH
BASOPHILS # BLD AUTO: 0 K/UL — SIGNIFICANT CHANGE UP (ref 0–0.2)
BASOPHILS NFR BLD AUTO: 0.8 % — SIGNIFICANT CHANGE UP (ref 0–2)
BILIRUB DIRECT SERPL-MCNC: 0.3 MG/DL — SIGNIFICANT CHANGE UP (ref 0–0.3)
BILIRUB INDIRECT FLD-MCNC: 0.3 MG/DL — SIGNIFICANT CHANGE UP (ref 0.2–1)
BILIRUB SERPL-MCNC: 0.6 MG/DL — SIGNIFICANT CHANGE UP (ref 0.4–2)
BUN SERPL-MCNC: 12 MG/DL — SIGNIFICANT CHANGE UP (ref 8–20)
CALCIUM SERPL-MCNC: 8.7 MG/DL — SIGNIFICANT CHANGE UP (ref 8.6–10.2)
CHLORIDE SERPL-SCNC: 107 MMOL/L — SIGNIFICANT CHANGE UP (ref 98–107)
CO2 SERPL-SCNC: 23 MMOL/L — SIGNIFICANT CHANGE UP (ref 22–29)
CREAT SERPL-MCNC: 0.68 MG/DL — SIGNIFICANT CHANGE UP (ref 0.5–1.3)
EOSINOPHIL # BLD AUTO: 0.3 K/UL — SIGNIFICANT CHANGE UP (ref 0–0.5)
EOSINOPHIL NFR BLD AUTO: 4.6 % — SIGNIFICANT CHANGE UP (ref 0–6)
GLUCOSE SERPL-MCNC: 116 MG/DL — HIGH (ref 70–115)
HCT VFR BLD CALC: 27.6 % — LOW (ref 37–47)
HGB BLD-MCNC: 8.9 G/DL — LOW (ref 12–16)
LYMPHOCYTES # BLD AUTO: 1.8 K/UL — SIGNIFICANT CHANGE UP (ref 1–4.8)
LYMPHOCYTES # BLD AUTO: 27.6 % — SIGNIFICANT CHANGE UP (ref 20–55)
MAGNESIUM SERPL-MCNC: 2.2 MG/DL — SIGNIFICANT CHANGE UP (ref 1.6–2.6)
MCHC RBC-ENTMCNC: 27.3 PG — SIGNIFICANT CHANGE UP (ref 27–31)
MCHC RBC-ENTMCNC: 32.2 G/DL — SIGNIFICANT CHANGE UP (ref 32–36)
MCV RBC AUTO: 84.7 FL — SIGNIFICANT CHANGE UP (ref 81–99)
MONOCYTES # BLD AUTO: 0.6 K/UL — SIGNIFICANT CHANGE UP (ref 0–0.8)
MONOCYTES NFR BLD AUTO: 8.6 % — SIGNIFICANT CHANGE UP (ref 3–10)
NEUTROPHILS # BLD AUTO: 3.7 K/UL — SIGNIFICANT CHANGE UP (ref 1.8–8)
NEUTROPHILS NFR BLD AUTO: 58.1 % — SIGNIFICANT CHANGE UP (ref 37–73)
PHOSPHATE SERPL-MCNC: 3.5 MG/DL — SIGNIFICANT CHANGE UP (ref 2.4–4.7)
PLATELET # BLD AUTO: 340 K/UL — SIGNIFICANT CHANGE UP (ref 150–400)
POTASSIUM SERPL-MCNC: 3.7 MMOL/L — SIGNIFICANT CHANGE UP (ref 3.5–5.3)
POTASSIUM SERPL-SCNC: 3.7 MMOL/L — SIGNIFICANT CHANGE UP (ref 3.5–5.3)
PROT SERPL-MCNC: 6.2 G/DL — LOW (ref 6.6–8.7)
RBC # BLD: 3.26 M/UL — LOW (ref 4.4–5.2)
RBC # FLD: 14.4 % — SIGNIFICANT CHANGE UP (ref 11–15.6)
SODIUM SERPL-SCNC: 141 MMOL/L — SIGNIFICANT CHANGE UP (ref 135–145)
WBC # BLD: 6.4 K/UL — SIGNIFICANT CHANGE UP (ref 4.8–10.8)
WBC # FLD AUTO: 6.4 K/UL — SIGNIFICANT CHANGE UP (ref 4.8–10.8)

## 2019-06-10 PROCEDURE — 81001 URINALYSIS AUTO W/SCOPE: CPT

## 2019-06-10 PROCEDURE — 86900 BLOOD TYPING SEROLOGIC ABO: CPT

## 2019-06-10 PROCEDURE — 85610 PROTHROMBIN TIME: CPT

## 2019-06-10 PROCEDURE — 85730 THROMBOPLASTIN TIME PARTIAL: CPT

## 2019-06-10 PROCEDURE — 86850 RBC ANTIBODY SCREEN: CPT

## 2019-06-10 PROCEDURE — 80048 BASIC METABOLIC PNL TOTAL CA: CPT

## 2019-06-10 PROCEDURE — 99285 EMERGENCY DEPT VISIT HI MDM: CPT

## 2019-06-10 PROCEDURE — 94760 N-INVAS EAR/PLS OXIMETRY 1: CPT

## 2019-06-10 PROCEDURE — 83735 ASSAY OF MAGNESIUM: CPT

## 2019-06-10 PROCEDURE — 71045 X-RAY EXAM CHEST 1 VIEW: CPT

## 2019-06-10 PROCEDURE — 86901 BLOOD TYPING SEROLOGIC RH(D): CPT

## 2019-06-10 PROCEDURE — 80076 HEPATIC FUNCTION PANEL: CPT

## 2019-06-10 PROCEDURE — 83690 ASSAY OF LIPASE: CPT

## 2019-06-10 PROCEDURE — 87086 URINE CULTURE/COLONY COUNT: CPT

## 2019-06-10 PROCEDURE — 85027 COMPLETE CBC AUTOMATED: CPT

## 2019-06-10 PROCEDURE — 93005 ELECTROCARDIOGRAM TRACING: CPT

## 2019-06-10 PROCEDURE — T1013: CPT

## 2019-06-10 PROCEDURE — 36415 COLL VENOUS BLD VENIPUNCTURE: CPT

## 2019-06-10 PROCEDURE — 74019 RADEX ABDOMEN 2 VIEWS: CPT

## 2019-06-10 PROCEDURE — 74176 CT ABD & PELVIS W/O CONTRAST: CPT

## 2019-06-10 PROCEDURE — 84100 ASSAY OF PHOSPHORUS: CPT

## 2019-06-10 PROCEDURE — 80053 COMPREHEN METABOLIC PANEL: CPT

## 2019-06-10 RX ORDER — POTASSIUM CHLORIDE 20 MEQ
40 PACKET (EA) ORAL ONCE
Refills: 0 | Status: COMPLETED | OUTPATIENT
Start: 2019-06-10 | End: 2019-06-10

## 2019-06-10 RX ADMIN — Medication 1 PACKET(S): at 05:47

## 2019-06-10 RX ADMIN — LISINOPRIL 2.5 MILLIGRAM(S): 2.5 TABLET ORAL at 05:46

## 2019-06-10 RX ADMIN — Medication 1 PACKET(S): at 11:11

## 2019-06-10 RX ADMIN — PANTOPRAZOLE SODIUM 40 MILLIGRAM(S): 20 TABLET, DELAYED RELEASE ORAL at 05:47

## 2019-06-10 RX ADMIN — Medication 25 MILLIGRAM(S): at 05:46

## 2019-06-10 RX ADMIN — Medication 120 MILLIGRAM(S): at 05:46

## 2019-06-10 RX ADMIN — Medication 40 MILLIEQUIVALENT(S): at 11:05

## 2019-06-10 NOTE — PATIENT PROFILE ADULT - NSPROEDALEARNPREF_GEN_A_NUR
video/skill demonstration/audio/computer/internet/pictorial/verbal instruction/written material/group instruction/individual instruction

## 2019-06-10 NOTE — PROGRESS NOTE ADULT - ASSESSMENT
82yo F h/o cholecystectomy p/w possible retained stone in CBD which may have passed considering the downtrending LFTs.  Pt to be dc'ed home and to return Thursday for outpatient ERCP.

## 2019-06-10 NOTE — PATIENT PROFILE ADULT - NSPROSPHOSPCHAPLAINYN_GEN_A_NUR
Problem: Falls - Risk of  Goal: *Absence of Falls  Document Haley Fall Risk and appropriate interventions in the flowsheet.    Outcome: Progressing Towards Goal  Fall Risk Interventions:  Mobility Interventions: Bed/chair exit alarm, Patient to call before getting OOB     Mentation Interventions: Door open when patient unattended, Increase mobility, Reorient patient     Medication Interventions: Bed/chair exit alarm, Patient to call before getting OOB     Elimination Interventions: Bed/chair exit alarm, Toileting schedule/hourly rounds     History of Falls Interventions: Bed/chair exit alarm, Door open when patient unattended, Room close to nurse's station no

## 2019-06-10 NOTE — PROGRESS NOTE ADULT - REASON FOR ADMISSION
retained gallstone

## 2019-06-10 NOTE — DISCHARGE NOTE NURSING/CASE MANAGEMENT/SOCIAL WORK - NSDCDPATPORTLINK_GEN_ALL_CORE
You can access the [facility] Patient Portal, offered by [facility], by registering with the following website: [email or web address]

## 2019-06-10 NOTE — PROGRESS NOTE ADULT - SUBJECTIVE AND OBJECTIVE BOX
INTERVAL HPI/OVERNIGHT EVENTS:    SUBJECTIVE:  ANJUM overnight.  Pt tolerating PO diet.  Understands to f/u for outpatient ERCP this week.  No sob/cp/n/v/f/ch.      MEDICATIONS  (STANDING):  ALBUTerol    90 MICROgram(s) HFA Inhaler 1 Puff(s) Inhalation every 4 hours  atorvastatin 10 milliGRAM(s) Oral at bedtime  diltiazem    milliGRAM(s) Oral daily  enoxaparin Injectable 40 milliGRAM(s) SubCutaneous daily  lisinopril Oral Tab/Cap - Peds 2.5 milliGRAM(s) Oral daily  metoprolol succinate ER 25 milliGRAM(s) Oral daily  ondansetron Injectable 4 milliGRAM(s) IV Push once  pantoprazole    Tablet 40 milliGRAM(s) Oral before breakfast  potassium phosphate / sodium phosphate powder 1 Packet(s) Oral three times a day before meals  tiotropium 18 MICROgram(s) Capsule 1 Capsule(s) Inhalation daily    MEDICATIONS  (PRN):  ibuprofen  Tablet. 400 milliGRAM(s) Oral every 6 hours PRN Temp greater or equal to 38.5C (101.3F)  ondansetron Injectable 4 milliGRAM(s) IV Push every 6 hours PRN Nausea      Vital Signs Last 24 Hrs  T(C): 36.8 (10 Jaxon 2019 07:45), Max: 37.7 (09 Jun 2019 17:51)  T(F): 98.2 (10 Jaxon 2019 07:45), Max: 99.8 (09 Jun 2019 17:51)  HR: 85 (10 Jaxon 2019 07:45) (64 - 99)  BP: 119/60 (10 Jaxon 2019 07:45) (115/51 - 155/70)  BP(mean): --  RR: 18 (10 Jaxon 2019 07:45) (18 - 18)  SpO2: 96% (10 Jaxon 2019 07:45) (93% - 98%)    PE  Gen: NAD  Pulm: nonlabored breathing  CV: RRR  Abd: soft, nt, nd  Ext: no cyanosis, clubbing, or edema  Vasc: 2+ pulses peripheral  Neuro: AAOx3, no sensory or motor deficits      I&O's Detail    09 Jun 2019 07:01  -  10 Jaxon 2019 07:00  --------------------------------------------------------  IN:    lactated ringers.: 1200 mL  Total IN: 1200 mL    OUT:  Total OUT: 0 mL    Total NET: 1200 mL          LABS:                        8.5    6.4   )-----------( 303      ( 09 Jun 2019 08:34 )             27.0     06-09    142  |  108<H>  |  9.0  ----------------------------<  83  4.1   |  23.0  |  0.66    Ca    9.0      09 Jun 2019 08:34  Phos  3.4     06-09  Mg     2.0     06-09    TPro  6.1<L>  /  Alb  3.4  /  TBili  1.4  /  DBili  0.9<H>  /  AST  403<H>  /  ALT  473<H>  /  AlkPhos  461<H>  06-09    PT/INR - ( 08 Jun 2019 09:30 )   PT: 13.1 sec;   INR: 1.13 ratio         PTT - ( 08 Jun 2019 09:30 )  PTT:27.7 sec      RADIOLOGY & ADDITIONAL STUDIES:

## 2019-06-12 ENCOUNTER — FORM ENCOUNTER (OUTPATIENT)
Age: 84
End: 2019-06-12

## 2019-06-12 PROBLEM — K56.609 UNSPECIFIED INTESTINAL OBSTRUCTION, UNSPECIFIED AS TO PARTIAL VERSUS COMPLETE OBSTRUCTION: Chronic | Status: ACTIVE | Noted: 2019-06-07

## 2019-06-13 ENCOUNTER — OUTPATIENT (OUTPATIENT)
Dept: OUTPATIENT SERVICES | Facility: HOSPITAL | Age: 84
LOS: 1 days | End: 2019-06-13
Payer: COMMERCIAL

## 2019-06-13 ENCOUNTER — APPOINTMENT (OUTPATIENT)
Dept: GASTROENTEROLOGY | Facility: HOSPITAL | Age: 84
End: 2019-06-13

## 2019-06-13 DIAGNOSIS — K80.50 CALCULUS OF BILE DUCT WITHOUT CHOLANGITIS OR CHOLECYSTITIS WITHOUT OBSTRUCTION: ICD-10-CM

## 2019-06-13 DIAGNOSIS — Z90.49 ACQUIRED ABSENCE OF OTHER SPECIFIED PARTS OF DIGESTIVE TRACT: Chronic | ICD-10-CM

## 2019-06-13 PROCEDURE — 43264 ERCP REMOVE DUCT CALCULI: CPT

## 2019-06-13 PROCEDURE — T1013: CPT

## 2019-06-13 PROCEDURE — C1773: CPT

## 2019-06-13 PROCEDURE — 43274 ERCP DUCT STENT PLACEMENT: CPT | Mod: 59

## 2019-06-13 PROCEDURE — 74330 X-RAY BILE/PANC ENDOSCOPY: CPT

## 2019-06-13 PROCEDURE — 43274 ERCP DUCT STENT PLACEMENT: CPT

## 2019-06-13 PROCEDURE — C1769: CPT

## 2019-06-13 PROCEDURE — C1889: CPT

## 2019-06-21 ENCOUNTER — FORM ENCOUNTER (OUTPATIENT)
Age: 84
End: 2019-06-21

## 2019-06-22 ENCOUNTER — APPOINTMENT (OUTPATIENT)
Dept: RADIOLOGY | Facility: CLINIC | Age: 84
End: 2019-06-22
Payer: MEDICARE

## 2019-06-22 ENCOUNTER — OUTPATIENT (OUTPATIENT)
Dept: OUTPATIENT SERVICES | Facility: HOSPITAL | Age: 84
LOS: 1 days | End: 2019-06-22
Payer: COMMERCIAL

## 2019-06-22 DIAGNOSIS — Z90.49 ACQUIRED ABSENCE OF OTHER SPECIFIED PARTS OF DIGESTIVE TRACT: Chronic | ICD-10-CM

## 2019-06-22 DIAGNOSIS — Z96.89 PRESENCE OF OTHER SPECIFIED FUNCTIONAL IMPLANTS: ICD-10-CM

## 2019-06-22 PROCEDURE — 74018 RADEX ABDOMEN 1 VIEW: CPT | Mod: 26

## 2019-06-22 PROCEDURE — 74018 RADEX ABDOMEN 1 VIEW: CPT

## 2019-06-27 ENCOUNTER — APPOINTMENT (OUTPATIENT)
Dept: CARDIOLOGY | Facility: CLINIC | Age: 84
End: 2019-06-27
Payer: MEDICARE

## 2019-06-27 ENCOUNTER — NON-APPOINTMENT (OUTPATIENT)
Age: 84
End: 2019-06-27

## 2019-06-27 ENCOUNTER — APPOINTMENT (OUTPATIENT)
Dept: TRAUMA SURGERY | Facility: CLINIC | Age: 84
End: 2019-06-27
Payer: MEDICARE

## 2019-06-27 VITALS
SYSTOLIC BLOOD PRESSURE: 124 MMHG | OXYGEN SATURATION: 98 % | DIASTOLIC BLOOD PRESSURE: 60 MMHG | HEART RATE: 73 BPM | WEIGHT: 104 LBS | BODY MASS INDEX: 22.75 KG/M2 | HEIGHT: 56.5 IN | RESPIRATION RATE: 14 BRPM

## 2019-06-27 VITALS
RESPIRATION RATE: 16 BRPM | OXYGEN SATURATION: 100 % | HEIGHT: 56.5 IN | HEART RATE: 70 BPM | WEIGHT: 106.03 LBS | SYSTOLIC BLOOD PRESSURE: 113 MMHG | BODY MASS INDEX: 23.19 KG/M2 | TEMPERATURE: 97.4 F | DIASTOLIC BLOOD PRESSURE: 67 MMHG

## 2019-06-27 DIAGNOSIS — K56.609 UNSPECIFIED INTESTINAL OBSTRUCTION, UNSPECIFIED AS TO PARTIAL VERSUS COMPLETE OBSTRUCTION: ICD-10-CM

## 2019-06-27 DIAGNOSIS — I65.21 OCCLUSION AND STENOSIS OF RIGHT CAROTID ARTERY: ICD-10-CM

## 2019-06-27 DIAGNOSIS — Z98.890 OTHER SPECIFIED POSTPROCEDURAL STATES: ICD-10-CM

## 2019-06-27 PROCEDURE — 93000 ELECTROCARDIOGRAM COMPLETE: CPT

## 2019-06-27 PROCEDURE — 99024 POSTOP FOLLOW-UP VISIT: CPT

## 2019-06-27 PROCEDURE — 99214 OFFICE O/P EST MOD 30 MIN: CPT

## 2019-06-27 RX ORDER — METOPROLOL SUCCINATE 50 MG/1
50 TABLET, EXTENDED RELEASE ORAL DAILY
Qty: 90 | Refills: 3 | Status: ACTIVE | COMMUNITY
Start: 2017-08-24 | End: 1900-01-01

## 2019-06-27 NOTE — REASON FOR VISIT
[Follow-Up - Clinic] : a clinic follow-up of [Coronary Artery Disease] : coronary artery disease [Carotid Artery Stenosis] : carotid stenosis [Other: _____] : [unfilled] [Spouse] : spouse

## 2019-06-27 NOTE — HISTORY OF PRESENT ILLNESS
[FreeTextEntry1] : s/p ex lap sbr for closed loop sbo,\par recently admitted for choledocholithiasis s/p ercp \par doing well , tolerating po diet, no n/v/f/c.

## 2019-07-05 ENCOUNTER — OTHER (OUTPATIENT)
Age: 84
End: 2019-07-05

## 2019-07-06 ENCOUNTER — APPOINTMENT (OUTPATIENT)
Dept: RADIOLOGY | Facility: CLINIC | Age: 84
End: 2019-07-06

## 2019-07-11 ENCOUNTER — OUTPATIENT (OUTPATIENT)
Dept: OUTPATIENT SERVICES | Facility: HOSPITAL | Age: 84
LOS: 1 days | End: 2019-07-11
Payer: COMMERCIAL

## 2019-07-11 ENCOUNTER — APPOINTMENT (OUTPATIENT)
Dept: RADIOLOGY | Facility: CLINIC | Age: 84
End: 2019-07-11
Payer: MEDICARE

## 2019-07-11 DIAGNOSIS — Z00.00 ENCOUNTER FOR GENERAL ADULT MEDICAL EXAMINATION WITHOUT ABNORMAL FINDINGS: ICD-10-CM

## 2019-07-11 DIAGNOSIS — Z90.49 ACQUIRED ABSENCE OF OTHER SPECIFIED PARTS OF DIGESTIVE TRACT: Chronic | ICD-10-CM

## 2019-07-11 PROCEDURE — 77080 DXA BONE DENSITY AXIAL: CPT

## 2019-07-11 PROCEDURE — 77080 DXA BONE DENSITY AXIAL: CPT | Mod: 26

## 2019-07-22 NOTE — ASSESSMENT
[FreeTextEntry1] : 1. hypertension- controlled on current regimen. \par 2. CAD- No new obstructive CAD on cath 5/2019.  Stable, asymptomatic,  continue aspirin/plavix/statin/metoprolol.  LDL goal < 70. Will obtain lipid panel results from PCP. \par 3. peripheral vascular disease - was following with Dr. Ortega, renewed meds, walking regimen,   aspirin/plavix/statin as above.  < 70% bilateral stenosis.  Repeat MRA in 1 yr. Monitor for TIA/CVA symptoms\par 4. hyperlipidemia - on statin/zetia therapy.  LDL goal < 70\par \par Thank you for allowing me to participate in your patient's care.  Please contact me if there are any questions or concerns.\par \par RTC 6-9 months\par  Head is atraumatic. Head shape is symmetrical.

## 2019-07-22 NOTE — HISTORY OF PRESENT ILLNESS
[FreeTextEntry1] : 83 F with CAD s/p multiple PCI in [facility], CABG 2011, previously a patient of Dr. Pulido, here to establish care after insurance change.   Most of the year is spend in [facility], but has been seeing Dr. Pulido on an annual basis. Reports claudication symptoms after walking 30 minutes, avid walker, symptoms controlled. Denies chest pain, shortness of breath, palpitations, dizziness, nausea, bleeding.  No PND, orthopnea, LE edema. Normal nuclear stress test in 2016. Peripheral angiogram 1/2017 which did not show significant disease. Cholesterol just drawn at PCP - Dr. Orlando\par also on vastaril  \par \par 7/12/18 Returns for follow up.  In the interim, carotid dopplers demonstrated moderate disease proximal KIRSTEN, mild on left.   No TIA symptoms, loss of vision.  Occasionally left rib, sharp pain.  Currently with abdominal pain.  No fevers/chills/sweats.  Feels nauseated.   Bloated.  Feels generalized weakness.  Vomited for times. No hematemesis.  No chest pain or SOB. No palpitations.  No dizziness. No syncope.  \par \par 6/27/19 Returns for follow up.  In the interim, underwent ex lap and small bowel resection for SBO.  Developed LBBB -> urgent cath without needing any interventions. \par \par

## 2019-07-22 NOTE — PHYSICAL EXAM
[General Appearance - Well Developed] : well developed [General Appearance - Well Nourished] : well nourished [Well Groomed] : well groomed [Normal Appearance] : normal appearance [No Deformities] : no deformities [General Appearance - In No Acute Distress] : no acute distress [Normal Conjunctiva] : the conjunctiva exhibited no abnormalities [Eyelids - No Xanthelasma] : the eyelids demonstrated no xanthelasmas [Normal Oral Mucosa] : normal oral mucosa [No Oral Cyanosis] : no oral cyanosis [Normal Jugular Venous V Waves Present] : normal jugular venous V waves present [No Oral Pallor] : no oral pallor [Exaggerated Use Of Accessory Muscles For Inspiration] : no accessory muscle use [Respiration, Rhythm And Depth] : normal respiratory rhythm and effort [Heart Sounds] : normal S1 and S2 [Auscultation Breath Sounds / Voice Sounds] : lungs were clear to auscultation bilaterally [Heart Rate And Rhythm] : heart rate and rhythm were normal [Edema] : no peripheral edema present [Murmurs] : no murmurs present [Arterial Pulses Normal] : the arterial pulses were normal [Abdomen Soft] : soft [Bowel Sounds] : normal bowel sounds [Abdomen Tenderness] : non-tender [Nail Clubbing] : no clubbing of the fingernails [Abnormal Walk] : normal gait [Cyanosis, Localized] : no localized cyanosis [Petechial Hemorrhages (___cm)] : no petechial hemorrhages [No Venous Stasis] : no venous stasis [Skin Color & Pigmentation] : normal skin color and pigmentation [Skin Lesions] : no skin lesions [] : no rash [No Skin Ulcers] : no skin ulcer [No Xanthoma] : no  xanthoma was observed [Affect] : the affect was normal [Oriented To Time, Place, And Person] : oriented to person, place, and time [Mood] : the mood was normal [No Anxiety] : not feeling anxious [FreeTextEntry1] : right sided carotid bruit, bilateral LE varicosities

## 2019-07-25 ENCOUNTER — APPOINTMENT (OUTPATIENT)
Dept: GASTROENTEROLOGY | Facility: CLINIC | Age: 84
End: 2019-07-25
Payer: MEDICARE

## 2019-07-25 VITALS
HEART RATE: 68 BPM | DIASTOLIC BLOOD PRESSURE: 70 MMHG | BODY MASS INDEX: 23.4 KG/M2 | HEIGHT: 56.5 IN | RESPIRATION RATE: 16 BRPM | OXYGEN SATURATION: 99 % | SYSTOLIC BLOOD PRESSURE: 122 MMHG | WEIGHT: 107 LBS

## 2019-07-25 DIAGNOSIS — Z96.89 PRESENCE OF OTHER SPECIFIED FUNCTIONAL IMPLANTS: ICD-10-CM

## 2019-07-25 PROCEDURE — 99214 OFFICE O/P EST MOD 30 MIN: CPT

## 2019-07-25 NOTE — HISTORY OF PRESENT ILLNESS
[de-identified] : The patient arrived along with her daughter. She was evaluated in the [facility] for choledocholithiasis. She underwent ERCP with bile duct stone removal. Pancreatic duct stent was placed to prevent any post-ERCP pancreatitis. Followup  x-ray showed the migration of the stent. She has no GI complaints.

## 2019-07-25 NOTE — ASSESSMENT
[FreeTextEntry1] : The patient will followup with her PCP and will undergo LFT check. I recommended to call us on an as needed basis.\par \par Ricardo Petty MD\par Gastroenterology \par \par

## 2019-07-25 NOTE — PHYSICAL EXAM
[General Appearance - Alert] : alert [General Appearance - In No Acute Distress] : in no acute distress [Sclera] : the sclera and conjunctiva were normal [PERRL With Normal Accommodation] : pupils were equal in size, round, and reactive to light [Extraocular Movements] : extraocular movements were intact [Outer Ear] : the ears and nose were normal in appearance [Oropharynx] : the oropharynx was normal [Neck Appearance] : the appearance of the neck was normal [Neck Cervical Mass (___cm)] : no neck mass was observed [Jugular Venous Distention Increased] : there was no jugular-venous distention [Thyroid Diffuse Enlargement] : the thyroid was not enlarged [Thyroid Nodule] : there were no palpable thyroid nodules [Auscultation Breath Sounds / Voice Sounds] : lungs were clear to auscultation bilaterally [Heart Rate And Rhythm] : heart rate was normal and rhythm regular [Heart Sounds] : normal S1 and S2 [Heart Sounds Gallop] : no gallops [Murmurs] : no murmurs [Heart Sounds Pericardial Friction Rub] : no pericardial rub [Bowel Sounds] : normal bowel sounds [Abdomen Soft] : soft [Abdomen Tenderness] : non-tender [] : no hepato-splenomegaly [Abdomen Mass (___ Cm)] : no abdominal mass palpated [FreeTextEntry1] : Well healed RUQ and midline surgical scar

## 2019-08-01 ENCOUNTER — OUTPATIENT (OUTPATIENT)
Dept: OUTPATIENT SERVICES | Facility: HOSPITAL | Age: 84
LOS: 1 days | End: 2019-08-01
Payer: COMMERCIAL

## 2019-08-01 ENCOUNTER — APPOINTMENT (OUTPATIENT)
Dept: MAMMOGRAPHY | Facility: CLINIC | Age: 84
End: 2019-08-01
Payer: MEDICARE

## 2019-08-01 DIAGNOSIS — Z90.49 ACQUIRED ABSENCE OF OTHER SPECIFIED PARTS OF DIGESTIVE TRACT: Chronic | ICD-10-CM

## 2019-08-01 DIAGNOSIS — R92.8 OTHER ABNORMAL AND INCONCLUSIVE FINDINGS ON DIAGNOSTIC IMAGING OF BREAST: ICD-10-CM

## 2019-08-01 PROCEDURE — 77063 BREAST TOMOSYNTHESIS BI: CPT | Mod: 26

## 2019-08-01 PROCEDURE — 77063 BREAST TOMOSYNTHESIS BI: CPT

## 2019-08-01 PROCEDURE — 77067 SCR MAMMO BI INCL CAD: CPT | Mod: 26

## 2019-08-01 PROCEDURE — 77067 SCR MAMMO BI INCL CAD: CPT

## 2019-12-03 NOTE — ED ADULT TRIAGE NOTE - NS ED NURSE AMBULANCES
[facility] I personally performed the service described in the documentation recorded by the scribe in my presence, and it accurately and completely records my words and actions.

## 2020-07-02 ENCOUNTER — APPOINTMENT (OUTPATIENT)
Dept: CARDIOLOGY | Facility: CLINIC | Age: 85
End: 2020-07-02

## 2020-07-26 ENCOUNTER — TRANSCRIPTION ENCOUNTER (OUTPATIENT)
Age: 85
End: 2020-07-26

## 2020-12-31 NOTE — PRE-OP CHECKLIST - TO WHOM
Patient: Ferdinand Ash    Procedure Summary     Date: 12/21/20 Room / Location: [address] [place] [address] MAIN OR 03 / [address] [place] [address] OR    Anesthesia Start: 2076 Anesthesia Stop:     Procedure: LAPAROSCOPIC COLON RESECTION - RIGHT (Right Abdomen) Diagnosis:       Small bowel
Patient: Stormy Shaw    Procedure Summary     Date: 12/21/20 Room / Location: [address] [place] [address] MAIN OR 03 / [address] [place] [address] MAIN OR    Anesthesia Start: 7669 Anesthesia Stop: 7711    Procedure: LAPAROSCOPIC COLON RESECTION - RIGHT (Right Abdomen) Diagnosis:       Small b
mustapha or nurse

## 2021-01-27 NOTE — ED STATDOCS - NS ED ATTENDING STATEMENT MOD
Hi Laisha,  Sounds like trospium is working well for Zoila with regard to her urinary symptom.  However, she's describing balance issues over the past month. Unclear if related to trospium (I know she had similar issues with oxybutynin previously) or her ongoing sinus/ear congestion. She has an appt in primary care today for the sinus congestion.   If her balance issues do not improve with treating her sinus/ear congestion, could consider stopping trospium to see if it's a side effect from the medication.  Wanted to keep you in the loop since she has a telehealth visit with you next week.  Thanks,  Taylor Lovell PA-C Scheduled procedure with Patient at      Telephone Information:   Mobile [phone]      Scheduled Via: Case Request Order for  [facility]   Procedure date: 3/5   Procedure time: 12:15PM ; Did patient request this specific time? No    -If non-ambulatory location, select reason: Not applicable  -Did patient request a specific facility other than MD's preferred facility? No; If so, which facility? N/A    -If a MAC pt is scheduled at [facility], pt was notified a family member/friend is need to stay with the patient over night after their procedure: No    Rep Contacted?: n/a            Notified patient about receiving an animated Niesha program? Yes    Was patient informed of COVID testing and self isolation prior to procedure? YES, 3/3 @[facility]    The following have been confirmed:  Insurance name confirmed as [employer], will be the same at time of procedure?: Yes Ins Accepted at Facility? Yes  Latex Allergy No  Diabetic No  Sleep Apnea No  Diuretic/Water pill No  Defibrillator/Pacemaker No  MRSA hx No  Blood thinners: Coumadin (Warfarin) or Plavix No      Aspirin No      Phentermine (diet pill) No    Pre-Op testing required n/a, Patient informed NA  Prep required? Yes, Pharmacy is [id number] [address] AT [facility] [address]  [phone]   (include location and/or phone number); Briefly reviewed? Yes; Prep cost range discussed? Yes  If procedure is scheduled 7 days or less, patient was told to  prep letter?: n/a       Attending Only

## 2021-05-24 NOTE — PATIENT PROFILE ADULT - FALLEN IN THE PAST
no Birth Control Pills Pregnancy And Lactation Text: This medication should be avoided if pregnant and for the first 30 days post-partum.

## 2021-12-07 NOTE — PATIENT PROFILE ADULT. - NSSUBSTANCEUSE_GEN_ALL_CORE_SD
Attempted to see pt for PT treatment; however, she was off the floor in EP. Will continue to follow. never used

## 2021-12-20 NOTE — ED STATDOCS - DATA REVIEWED, MDM
6 y/o male with no significant PMH presents with mother with complaint of fever max 103F since last night associated with runny nose. Mother states pt brother was sick 2 days ago with similar symptoms. Mother denies chills, headache, dizziness, vision changes, cough, chest pain, shortness of breath, abdominal pain, nausea, vomiting, diarrhea, rash, or any other complaints. vital signs/old records

## 2022-01-18 NOTE — PATIENT PROFILE ADULT - BRADEN FRICTION AND SHEAR
Bed: 11  Expected date:   Expected time:   Means of arrival:   Comments:  Triage    (3) no apparent problem

## 2022-01-19 NOTE — ED STATDOCS - MUSCULOSKELETAL, MLM
[FreeTextEntry1] : 18 yo female s/p concussion with post concussive symptoms. Neurological examination is non focal, non lateralizing without signs of increased intracranial pressure. Which is reassuring at this time.\par  range of motion is not limited and there is no muscle tenderness.

## 2022-09-16 ENCOUNTER — APPOINTMENT (OUTPATIENT)
Dept: MAMMOGRAPHY | Facility: CLINIC | Age: 87
End: 2022-09-16

## 2022-09-16 ENCOUNTER — APPOINTMENT (OUTPATIENT)
Dept: RADIOLOGY | Facility: CLINIC | Age: 87
End: 2022-09-16

## 2022-09-16 ENCOUNTER — OUTPATIENT (OUTPATIENT)
Dept: OUTPATIENT SERVICES | Facility: HOSPITAL | Age: 87
LOS: 1 days | End: 2022-09-16
Payer: COMMERCIAL

## 2022-09-16 DIAGNOSIS — Z90.49 ACQUIRED ABSENCE OF OTHER SPECIFIED PARTS OF DIGESTIVE TRACT: Chronic | ICD-10-CM

## 2022-09-16 DIAGNOSIS — Z13.820 ENCOUNTER FOR SCREENING FOR OSTEOPOROSIS: ICD-10-CM

## 2022-09-16 DIAGNOSIS — Z00.8 ENCOUNTER FOR OTHER GENERAL EXAMINATION: ICD-10-CM

## 2022-09-16 DIAGNOSIS — Z12.31 ENCOUNTER FOR SCREENING MAMMOGRAM FOR MALIGNANT NEOPLASM OF BREAST: ICD-10-CM

## 2022-09-16 PROCEDURE — 77067 SCR MAMMO BI INCL CAD: CPT

## 2022-09-16 PROCEDURE — 77080 DXA BONE DENSITY AXIAL: CPT | Mod: 26

## 2022-09-16 PROCEDURE — 77067 SCR MAMMO BI INCL CAD: CPT | Mod: 26

## 2022-09-16 PROCEDURE — 77080 DXA BONE DENSITY AXIAL: CPT

## 2022-09-16 PROCEDURE — 77063 BREAST TOMOSYNTHESIS BI: CPT | Mod: 26

## 2022-09-16 PROCEDURE — 77063 BREAST TOMOSYNTHESIS BI: CPT

## 2022-09-21 ENCOUNTER — APPOINTMENT (OUTPATIENT)
Dept: CARDIOLOGY | Facility: CLINIC | Age: 87
End: 2022-09-21

## 2022-09-21 ENCOUNTER — NON-APPOINTMENT (OUTPATIENT)
Age: 87
End: 2022-09-21

## 2022-09-21 VITALS
HEART RATE: 67 BPM | OXYGEN SATURATION: 98 % | TEMPERATURE: 98 F | DIASTOLIC BLOOD PRESSURE: 65 MMHG | WEIGHT: 116 LBS | HEIGHT: 56.5 IN | BODY MASS INDEX: 25.38 KG/M2 | SYSTOLIC BLOOD PRESSURE: 107 MMHG

## 2022-09-21 DIAGNOSIS — I73.9 PERIPHERAL VASCULAR DISEASE, UNSPECIFIED: ICD-10-CM

## 2022-09-21 PROCEDURE — 93000 ELECTROCARDIOGRAM COMPLETE: CPT

## 2022-09-21 PROCEDURE — 99204 OFFICE O/P NEW MOD 45 MIN: CPT | Mod: 25

## 2022-09-21 RX ORDER — ATORVASTATIN CALCIUM 40 MG/1
40 TABLET, FILM COATED ORAL
Qty: 90 | Refills: 0 | Status: DISCONTINUED | COMMUNITY
Start: 2017-09-29 | End: 2022-09-21

## 2022-09-21 NOTE — REASON FOR VISIT
[Follow-Up - Clinic] : a clinic follow-up of [Carotid Artery Stenosis] : carotid stenosis [Coronary Artery Disease] : coronary artery disease [Spouse] : spouse [Other: _____] : [unfilled]

## 2022-09-21 NOTE — HISTORY OF PRESENT ILLNESS
[FreeTextEntry1] : 86 F recently , with CAD s/p multiple PCI in [facility], CABG x 3 2011, previously a patient of Dr. Pulido, here to establish care after insurance change. Most of the year is spend in [facility]. Lost to follow up for 3 years. \par Last cath was in 5/2019 that revealed patent MURRELL to LAD, severe RCA disease and borderline normal LV function. No intervention performed at that time.\par At the present time patient is completely asymptomatic and denies CP, SOB, orthopnea or PND. Denies palpitations, dizziness or ankle swelling.\par Last Echo was done in 5/2019 that revealed normal LV function (55-60%). Trace MR and mild AK. \par Reports claudication symptoms after walking 30 minutes, avid walker, symptoms controlled. Peripheral angiogram 1/2017 which did not show significant disease. \par Had a PPM in 3/2021. [facility]\par Underwent ex lap cholecystectomy and small bowel resection?\par Hyperlipidemia treated with meds\par Prediabetes, treated with diet alone\par Never smoked\par Occasional alcohol Denies the use of illicit drugs\par Received the COVID 19 vaccine\par  \par \par

## 2022-09-21 NOTE — DISCUSSION/SUMMARY
[FreeTextEntry1] : Ms. PHYLICIA LAWSON is a 86 year female with known CAD. Had CABG x 3 and multiple PCIs (?). Last cath in 2019 revealed patent LIMA to LAD and patent stent in LMCA to LCx with minimal ISR. Now lost to FU for 3 years. At the present time she is completely asymptomatic.\par I have recommended to continue the same medications.\par We will perform an echocardiogram to assess left ventricular and valvular function.\par We will perform routine laboratory tests\par Routine follow up in 6 months\par

## 2022-09-26 ENCOUNTER — OUTPATIENT (OUTPATIENT)
Dept: OUTPATIENT SERVICES | Facility: HOSPITAL | Age: 87
LOS: 1 days | End: 2022-09-26
Payer: COMMERCIAL

## 2022-09-26 ENCOUNTER — APPOINTMENT (OUTPATIENT)
Dept: RADIOLOGY | Facility: CLINIC | Age: 87
End: 2022-09-26

## 2022-09-26 DIAGNOSIS — Z90.49 ACQUIRED ABSENCE OF OTHER SPECIFIED PARTS OF DIGESTIVE TRACT: Chronic | ICD-10-CM

## 2022-09-26 DIAGNOSIS — M25.511 PAIN IN RIGHT SHOULDER: ICD-10-CM

## 2022-09-26 DIAGNOSIS — Z00.8 ENCOUNTER FOR OTHER GENERAL EXAMINATION: ICD-10-CM

## 2022-09-26 PROCEDURE — 73030 X-RAY EXAM OF SHOULDER: CPT | Mod: 26,50

## 2022-09-26 PROCEDURE — 73030 X-RAY EXAM OF SHOULDER: CPT

## 2022-10-04 ENCOUNTER — APPOINTMENT (OUTPATIENT)
Dept: CARDIOLOGY | Facility: CLINIC | Age: 87
End: 2022-10-04

## 2022-11-02 ENCOUNTER — APPOINTMENT (OUTPATIENT)
Dept: GASTROENTEROLOGY | Facility: CLINIC | Age: 87
End: 2022-11-02

## 2022-11-02 VITALS
OXYGEN SATURATION: 98 % | DIASTOLIC BLOOD PRESSURE: 60 MMHG | HEART RATE: 59 BPM | RESPIRATION RATE: 14 BRPM | HEIGHT: 56 IN | BODY MASS INDEX: 26.54 KG/M2 | SYSTOLIC BLOOD PRESSURE: 130 MMHG | TEMPERATURE: 97.2 F | WEIGHT: 118 LBS

## 2022-11-02 DIAGNOSIS — R10.13 EPIGASTRIC PAIN: ICD-10-CM

## 2022-11-02 PROCEDURE — 99203 OFFICE O/P NEW LOW 30 MIN: CPT

## 2022-11-02 NOTE — ASSESSMENT
[FreeTextEntry1] : The patient is is doing reasonably okay.  No weight loss.  No alarming GI symptoms except for abdominal discomfort and some diarrhea.  We will perform blood tests.  May need to start PPI therapy as the patient is on dual antiplatelet therapy.  If needed we will perform EGD.  May need also imaging.  Follow-up in 2 months.\par \par Ricardo Petty MD\par Gastroenterology \par \par

## 2022-11-02 NOTE — HISTORY OF PRESENT ILLNESS
[FreeTextEntry1] : The patient arrived for a consultation.  She is accompanied by her daughter who is interpreting.  She speaks Ukrainian.  She has been evaluated in 2019 for choledocholithiasis.She also has a history of cholecystectomy and small bowel obstruction requiring surgery.  ERCP was uneventful.  Pancreatic duct stent was placed to prevent post ERCP pancreatitis.  Lately she has been having some epigastric discomfort which is she is denying today.  She is having some loose stools especially in the breakfast.  She had a remote colonoscopy.  I do not have any records on that.  Do not have any labs.  She was recently seen by her cardiologist.  She is on aspirin and Plavix.

## 2022-11-02 NOTE — PHYSICAL EXAM
[Alert] : alert [Normal Voice/Communication] : normal voice/communication [Healthy Appearing] : healthy appearing [No Acute Distress] : no acute distress [Sclera] : the sclera and conjunctiva were normal [Hearing Threshold Finger Rub Not [place]] : hearing was normal [Normal Lips/Gums] : the lips and gums were normal [Oropharynx] : the oropharynx was normal [Normal Appearance] : the appearance of the neck was normal [No Neck Mass] : no neck mass was observed [No Respiratory Distress] : no respiratory distress [No Acc Muscle Use] : no accessory muscle use [Respiration, Rhythm And Depth] : normal respiratory rhythm and effort [Auscultation Breath Sounds / Voice Sounds] : lungs were clear to auscultation bilaterally [Heart Rate And Rhythm] : heart rate was normal and rhythm regular [Normal S1, S2] : normal S1 and S2 [Murmurs] : no murmurs [Bowel Sounds] : normal bowel sounds [Abdomen Tenderness] : non-tender [No Masses] : no abdominal mass palpated [Abdomen Soft] : soft [] : no hepatosplenomegaly [Oriented To Time, Place, And Person] : oriented to person, place, and time

## 2022-11-16 ENCOUNTER — APPOINTMENT (OUTPATIENT)
Dept: CARDIOLOGY | Facility: CLINIC | Age: 87
End: 2022-11-16

## 2022-11-16 PROCEDURE — 93306 TTE W/DOPPLER COMPLETE: CPT

## 2022-11-17 ENCOUNTER — LABORATORY RESULT (OUTPATIENT)
Age: 87
End: 2022-11-17

## 2022-11-17 ENCOUNTER — APPOINTMENT (OUTPATIENT)
Dept: NEUROLOGY | Facility: CLINIC | Age: 87
End: 2022-11-17

## 2022-11-17 ENCOUNTER — NON-APPOINTMENT (OUTPATIENT)
Age: 87
End: 2022-11-17

## 2022-11-17 PROCEDURE — 99204 OFFICE O/P NEW MOD 45 MIN: CPT

## 2022-11-17 NOTE — PHYSICAL EXAM
[General Appearance - Alert] : alert [General Appearance - In No Acute Distress] : in no acute distress [Affect] : the affect was normal [Cranial Nerves Optic (II)] : visual acuity intact bilaterally,  visual fields full to confrontation, pupils equal round and reactive to light [Cranial Nerves Oculomotor (III)] : extraocular motion intact [Cranial Nerves Trigeminal (V)] : facial sensation intact symmetrically [Cranial Nerves Facial (VII)] : face symmetrical [Cranial Nerves Vestibulocochlear (VIII)] : hearing was intact bilaterally [Cranial Nerves Glossopharyngeal (IX)] : tongue and palate midline [Cranial Nerves Accessory (XI - Cranial And Spinal)] : head turning and shoulder shrug symmetric [Cranial Nerves Hypoglossal (XII)] : there was no tongue deviation with protrusion [Motor Tone] : muscle tone was normal in all four extremities [Motor Strength] : muscle strength was normal in all four extremities [Sensation Tactile Decrease] : light touch was intact [Sensation Pain / Temperature Decrease] : pain and temperature was intact [Sensation Vibration Decrease] : vibration was intact [Abnormal Walk] : normal gait [2+] : Ankle jerk left 2+ [Optic Disc Abnormality] : the optic disc were normal in size and color [Total Score ___ / 30] : the patient achieved a score of [unfilled] /30 [Date / Time ___ / 5] : date / time [unfilled] / 5 [Place ___ / 5] : place [unfilled] / 5 [Registration ___ / 3] : registration [unfilled] / 3 [Serial Sevens ___/5] : serial sevens [unfilled] / 5 [Naming 2 Objects ___ / 2] : naming two objects [unfilled] / 2 [Repeating a Sentence ___ / 1] : repeating a sentence [unfilled] / 1 [Writing a Sentence ___ / 1] : write sentence [unfilled] / 1 [3-stage Verbal Command ___ / 3] : three-stage verbal command [unfilled] / 3 [Written Command ___ / 1] : written command [unfilled] / 1 [Copy a Design ___ / 1] : copy a design [unfilled] / 1 [Recall ___ / 3] : recall [unfilled] / 3 [Dysarthria] : no dysarthria [Aphasia] : no dysphasia/aphasia [Romberg's Sign] : Romberg's sign was negtive [Coordination - Dysmetria Impaired Finger-to-Nose Bilateral] : not present [Plantar Reflex Right Only] : normal on the right [Plantar Reflex Left Only] : normal on the left

## 2022-11-17 NOTE — HISTORY OF PRESENT ILLNESS
[FreeTextEntry1] : I saw this patient in the office today.\par She presents with her daughter.\par \par She was diagnosed with Alzheimer's dementia in early 2021 in [facility].\par She had symptoms dating back at least to 2019 however.\par It is predominantly short term memory that is affected.\par \par In addition she describes headaches frequently.\par This has been going on for many years.

## 2022-11-17 NOTE — ASSESSMENT
[FreeTextEntry1] : This is an 87 year-old woman with what appears to be Alzheimer's dementia.\par I will repeat the CT scan of the head (she had 1 in [facility] in early 2021).\par She has a pacemaker and therefore MRI will be avoided.\par I would also like to obtain some blood test to look for reversible causes of cognitive dysfunction.\par \par She also has a history of chronic headaches.\par I will obtain a sedimentation rate.\par \par I have explained to the patient's daughter that there are no medications that reverse dementia.\par There are medications that have been shown to slow down the progression of functional decline.\par \par I have suggested a trial of memantine starting at 5 mg.\par I will titrate to 10 mg twice per day.\par \par I will see her back after the above testing.

## 2022-11-17 NOTE — CONSULT LETTER
[Dear  ___] : Dear  [unfilled], [Courtesy Letter:] : I had the pleasure of seeing your patient, [unfilled], in my office today. [Please see my note below.] : Please see my note below. [Consult Closing:] : Thank you very much for allowing me to participate in the care of this patient.  If you have any questions, please do not hesitate to contact me. [Sincerely,] : Sincerely, [FreeTextEntry3] : Man Ventura MD.

## 2022-11-18 LAB
ERYTHROCYTE [SEDIMENTATION RATE] IN BLOOD BY WESTERGREN METHOD: 46 MM/HR
FOLATE SERPL-MCNC: 18.9 NG/ML
T3RU NFR SERPL: 1.1 TBI
T4 SERPL-MCNC: 5.8 UG/DL
TSH SERPL-ACNC: 2.73 UIU/ML
VIT B12 SERPL-MCNC: 558 PG/ML

## 2022-11-20 LAB
ALBUMIN SERPL ELPH-MCNC: 4.9 G/DL
ALP BLD-CCNC: 125 U/L
ALT SERPL-CCNC: 38 U/L
ANION GAP SERPL CALC-SCNC: 12 MMOL/L
AST SERPL-CCNC: 46 U/L
BASOPHILS # BLD AUTO: 0.11 K/UL
BASOPHILS NFR BLD AUTO: 1.5 %
BILIRUB SERPL-MCNC: 0.2 MG/DL
BUN SERPL-MCNC: 27 MG/DL
CALCIUM SERPL-MCNC: 9.3 MG/DL
CHLORIDE SERPL-SCNC: 106 MMOL/L
CO2 SERPL-SCNC: 25 MMOL/L
CREAT SERPL-MCNC: 1.14 MG/DL
EGFR: 47 ML/MIN/1.73M2
EOSINOPHIL # BLD AUTO: 0.32 K/UL
EOSINOPHIL NFR BLD AUTO: 4.2 %
GLUCOSE SERPL-MCNC: 109 MG/DL
HCT VFR BLD CALC: 39.8 %
HGB BLD-MCNC: 12.5 G/DL
IMM GRANULOCYTES NFR BLD AUTO: 0.4 %
INR PPP: 0.97 RATIO
LYMPHOCYTES # BLD AUTO: 2.13 K/UL
LYMPHOCYTES NFR BLD AUTO: 28.2 %
MAN DIFF?: NORMAL
MCHC RBC-ENTMCNC: 30.5 PG
MCHC RBC-ENTMCNC: 31.4 GM/DL
MCV RBC AUTO: 97.1 FL
MONOCYTES # BLD AUTO: 0.72 K/UL
MONOCYTES NFR BLD AUTO: 9.5 %
NEUTROPHILS # BLD AUTO: 4.23 K/UL
NEUTROPHILS NFR BLD AUTO: 56.2 %
PLATELET # BLD AUTO: 310 K/UL
POTASSIUM SERPL-SCNC: 4.9 MMOL/L
PROT SERPL-MCNC: 7.3 G/DL
PT BLD: 11.3 SEC
RBC # BLD: 4.1 M/UL
RBC # FLD: 13.9 %
SODIUM SERPL-SCNC: 144 MMOL/L
WBC # FLD AUTO: 7.54 K/UL

## 2022-11-22 LAB — METHYLMALONATE SERPL-SCNC: 328 NMOL/L

## 2022-11-25 LAB
BACTERIA STL CULT: NORMAL
H PYLORI AG STL QL: NEGATIVE

## 2022-11-30 LAB
FAT STL QN: NORMAL
FAT STL QN: NORMAL
PANCREATIC ELASTASE, FECAL: 311 CD:794062645

## 2023-01-18 ENCOUNTER — APPOINTMENT (OUTPATIENT)
Dept: CARDIOLOGY | Facility: CLINIC | Age: 88
End: 2023-01-18
Payer: MEDICARE

## 2023-01-18 ENCOUNTER — NON-APPOINTMENT (OUTPATIENT)
Age: 88
End: 2023-01-18

## 2023-01-18 VITALS
HEIGHT: 56 IN | WEIGHT: 119 LBS | SYSTOLIC BLOOD PRESSURE: 128 MMHG | DIASTOLIC BLOOD PRESSURE: 66 MMHG | OXYGEN SATURATION: 97 % | HEART RATE: 64 BPM | TEMPERATURE: 98.3 F | BODY MASS INDEX: 26.77 KG/M2

## 2023-01-18 PROCEDURE — 99213 OFFICE O/P EST LOW 20 MIN: CPT | Mod: 25

## 2023-01-18 PROCEDURE — 93000 ELECTROCARDIOGRAM COMPLETE: CPT

## 2023-01-18 RX ORDER — CYCLOSPORINE 0.5 MG/ML
0.05 EMULSION OPHTHALMIC
Refills: 0 | Status: ACTIVE | COMMUNITY
Start: 2017-09-05

## 2023-01-18 RX ORDER — MEMANTINE HYDROCHLORIDE 5 MG/1
5 TABLET, FILM COATED ORAL
Qty: 60 | Refills: 0 | Status: DISCONTINUED | COMMUNITY
Start: 2022-11-17 | End: 2023-01-18

## 2023-01-18 RX ORDER — IBANDRONATE SODIUM 150 MG/1
150 TABLET ORAL
Refills: 0 | Status: ACTIVE | COMMUNITY
Start: 2022-09-08

## 2023-01-18 NOTE — PHYSICAL EXAM

## 2023-01-18 NOTE — DISCUSSION/SUMMARY
[FreeTextEntry1] : Ms. PHYLICIA LAWSON is a 87 year female with known CAD. Had CABG x 3 and multiple PCIs (?). Last cath in 2019 revealed patent LIMA to LAD and patent stent in LMCA to LCx with minimal ISR. \par Has mild cognitive impairment, possible Alzheimers. At the present time she is completely asymptomatic, except for occasional dizziness. Possible medications? .\par I have recommended to continue the same medications.\par We reviewed the results iof the TTE and blood work\par Will request PPM check\par Routine follow up in 6 months\par

## 2023-01-18 NOTE — HISTORY OF PRESENT ILLNESS
[FreeTextEntry1] : 87 F recently , with CAD s/p multiple PCI in [facility], CABG x 3 2011, previously a patient of Dr. Pulido. Most of the year is spend in [facility]. Lost to follow up for 3 years. \par Last cath was in 5/2019 that revealed patent MURRELL to LAD, severe RCA disease and borderline normal LV function. No intervention performed at that time.\par At the present time patient is completely asymptomatic and denies CP, SOB, orthopnea or PND. Denies palpitations, dizziness or ankle swelling.\par Daughter states that she has been C/O dizziness. No falls. Under eval for dementia/Alzheimers disease with Neurology.\par Last Echo was done in 5/2019 that revealed normal LV function (55-60%). Trace MR and mild NE. \par Reports claudication symptoms after walking 30 minutes, avid walker, symptoms controlled. Peripheral angiogram 1/2017 which did not show significant disease. \par Had a PPM in 3/2021. [facility]\par Underwent ex lap cholecystectomy and small bowel resection?\par Hyperlipidemia treated with meds\par Prediabetes, treated with diet alone\par Never smoked\par Occasional alcohol Denies the use of illicit drugs\par Received the COVID 19 vaccine\par  \par \par

## 2023-01-18 NOTE — REVIEW OF SYSTEMS
[Headache] : headache [Dizziness] : dizziness [Negative] : Heme/Lymph [Tremor] : no tremor was seen [Convulsions] : no convulsions [Limb Weakness (Paresis)] : no limb weakness (Paresis)

## 2023-01-20 ENCOUNTER — APPOINTMENT (OUTPATIENT)
Dept: GASTROENTEROLOGY | Facility: CLINIC | Age: 88
End: 2023-01-20

## 2023-01-23 ENCOUNTER — EMERGENCY (EMERGENCY)
Facility: HOSPITAL | Age: 88
LOS: 1 days | Discharge: HOME | End: 2023-01-23
Attending: STUDENT IN AN ORGANIZED HEALTH CARE EDUCATION/TRAINING PROGRAM
Payer: MEDICARE

## 2023-01-23 VITALS
HEIGHT: 60 IN | RESPIRATION RATE: 16 BRPM | DIASTOLIC BLOOD PRESSURE: 65 MMHG | TEMPERATURE: 99 F | SYSTOLIC BLOOD PRESSURE: 132 MMHG | OXYGEN SATURATION: 98 % | WEIGHT: 119.05 LBS | HEART RATE: 87 BPM

## 2023-01-23 DIAGNOSIS — Z90.49 ACQUIRED ABSENCE OF OTHER SPECIFIED PARTS OF DIGESTIVE TRACT: Chronic | ICD-10-CM

## 2023-01-23 LAB
ALBUMIN SERPL ELPH-MCNC: 4 G/DL — SIGNIFICANT CHANGE UP (ref 3.3–5.2)
ALP SERPL-CCNC: 127 U/L — HIGH (ref 40–120)
ALT FLD-CCNC: 35 U/L — HIGH
ANION GAP SERPL CALC-SCNC: 10 MMOL/L — SIGNIFICANT CHANGE UP (ref 5–17)
APPEARANCE UR: CLEAR — SIGNIFICANT CHANGE UP
AST SERPL-CCNC: 43 U/L — HIGH
BACTERIA # UR AUTO: ABNORMAL
BILIRUB SERPL-MCNC: 0.2 MG/DL — LOW (ref 0.4–2)
BILIRUB UR-MCNC: NEGATIVE — SIGNIFICANT CHANGE UP
BUN SERPL-MCNC: 28.8 MG/DL — HIGH (ref 8–20)
CALCIUM SERPL-MCNC: 8.9 MG/DL — SIGNIFICANT CHANGE UP (ref 8.4–10.5)
CHLORIDE SERPL-SCNC: 104 MMOL/L — SIGNIFICANT CHANGE UP (ref 96–108)
CO2 SERPL-SCNC: 23 MMOL/L — SIGNIFICANT CHANGE UP (ref 22–29)
COLOR SPEC: YELLOW — SIGNIFICANT CHANGE UP
CREAT SERPL-MCNC: 1.11 MG/DL — SIGNIFICANT CHANGE UP (ref 0.5–1.3)
DIFF PNL FLD: NEGATIVE — SIGNIFICANT CHANGE UP
EGFR: 48 ML/MIN/1.73M2 — LOW
EPI CELLS # UR: SIGNIFICANT CHANGE UP
GLUCOSE SERPL-MCNC: 105 MG/DL — HIGH (ref 70–99)
GLUCOSE UR QL: NEGATIVE MG/DL — SIGNIFICANT CHANGE UP
GRAN CASTS # UR COMP ASSIST: ABNORMAL /LPF
HCT VFR BLD CALC: 36.8 % — SIGNIFICANT CHANGE UP (ref 34.5–45)
HGB BLD-MCNC: 11.9 G/DL — SIGNIFICANT CHANGE UP (ref 11.5–15.5)
KETONES UR-MCNC: NEGATIVE — SIGNIFICANT CHANGE UP
LEUKOCYTE ESTERASE UR-ACNC: ABNORMAL
MCHC RBC-ENTMCNC: 29.6 PG — SIGNIFICANT CHANGE UP (ref 27–34)
MCHC RBC-ENTMCNC: 32.3 GM/DL — SIGNIFICANT CHANGE UP (ref 32–36)
MCV RBC AUTO: 91.5 FL — SIGNIFICANT CHANGE UP (ref 80–100)
NITRITE UR-MCNC: NEGATIVE — SIGNIFICANT CHANGE UP
PH UR: 6 — SIGNIFICANT CHANGE UP (ref 5–8)
PLATELET # BLD AUTO: 287 K/UL — SIGNIFICANT CHANGE UP (ref 150–400)
POTASSIUM SERPL-MCNC: 5 MMOL/L — SIGNIFICANT CHANGE UP (ref 3.5–5.3)
POTASSIUM SERPL-SCNC: 5 MMOL/L — SIGNIFICANT CHANGE UP (ref 3.5–5.3)
PROT SERPL-MCNC: 7 G/DL — SIGNIFICANT CHANGE UP (ref 6.6–8.7)
PROT UR-MCNC: NEGATIVE — SIGNIFICANT CHANGE UP
RBC # BLD: 4.02 M/UL — SIGNIFICANT CHANGE UP (ref 3.8–5.2)
RBC # FLD: 13.8 % — SIGNIFICANT CHANGE UP (ref 10.3–14.5)
RBC CASTS # UR COMP ASSIST: SIGNIFICANT CHANGE UP /HPF (ref 0–4)
SODIUM SERPL-SCNC: 137 MMOL/L — SIGNIFICANT CHANGE UP (ref 135–145)
SP GR SPEC: 1.01 — SIGNIFICANT CHANGE UP (ref 1.01–1.02)
UROBILINOGEN FLD QL: NEGATIVE MG/DL — SIGNIFICANT CHANGE UP
WBC # BLD: 8.32 K/UL — SIGNIFICANT CHANGE UP (ref 3.8–10.5)
WBC # FLD AUTO: 8.32 K/UL — SIGNIFICANT CHANGE UP (ref 3.8–10.5)
WBC UR QL: SIGNIFICANT CHANGE UP /HPF (ref 0–5)

## 2023-01-23 PROCEDURE — 80053 COMPREHEN METABOLIC PANEL: CPT

## 2023-01-23 PROCEDURE — 99285 EMERGENCY DEPT VISIT HI MDM: CPT

## 2023-01-23 PROCEDURE — 93005 ELECTROCARDIOGRAM TRACING: CPT

## 2023-01-23 PROCEDURE — 85027 COMPLETE CBC AUTOMATED: CPT

## 2023-01-23 PROCEDURE — 93010 ELECTROCARDIOGRAM REPORT: CPT

## 2023-01-23 PROCEDURE — 99284 EMERGENCY DEPT VISIT MOD MDM: CPT | Mod: 25

## 2023-01-23 PROCEDURE — 81001 URINALYSIS AUTO W/SCOPE: CPT

## 2023-01-23 PROCEDURE — 84484 ASSAY OF TROPONIN QUANT: CPT

## 2023-01-23 PROCEDURE — 36415 COLL VENOUS BLD VENIPUNCTURE: CPT

## 2023-01-23 PROCEDURE — 87086 URINE CULTURE/COLONY COUNT: CPT

## 2023-01-23 PROCEDURE — 96360 HYDRATION IV INFUSION INIT: CPT

## 2023-01-23 RX ORDER — SODIUM CHLORIDE 9 MG/ML
500 INJECTION INTRAMUSCULAR; INTRAVENOUS; SUBCUTANEOUS ONCE
Refills: 0 | Status: COMPLETED | OUTPATIENT
Start: 2023-01-23 | End: 2023-01-23

## 2023-01-23 RX ADMIN — SODIUM CHLORIDE 500 MILLILITER(S): 9 INJECTION INTRAMUSCULAR; INTRAVENOUS; SUBCUTANEOUS at 19:05

## 2023-01-23 NOTE — ED ADULT NURSE REASSESSMENT NOTE - NS ED NURSE REASSESS COMMENT FT1
Assumed care of patient at approx 19:30. Patient AxOx4. Patient pain/discomfort, denies CP/SOB. Patient has been updated on the plan of care. Patient offers no complaints at this time. No visible signs of acute distress noted, safety maintained.

## 2023-01-23 NOTE — ED PROVIDER NOTE - PATIENT PORTAL LINK FT
You can access the FollowMyHealth Patient Portal offered by [facility] by registering at the following website: [email or web address]. By joining [facility]’s FollowMyHealth portal, you will also be able to view your health information using other applications (apps) compatible with our system.

## 2023-01-23 NOTE — ED ADULT NURSE NOTE - CAS ELECT INFOMATION PROVIDED
Discharge instructions reviewed with patient and family. Pt verbalizes understanding. VSS./DC instructions

## 2023-01-23 NOTE — ED PROVIDER NOTE - CLINICAL SUMMARY MEDICAL DECISION MAKING FREE TEXT BOX
87F with hx significant for cardiac disease including PM placement presents following a pre-syncopal episode while urinating this AM. Pt recovered and has since felt well. Given cardiac hx, will eval for underlying ACS vs dysrhythmia vs UTI which may have caused this episode. EKG regular paced rhythm. Labs, interrogate PM, CXR, IVF, dc pending negative workup 87F with hx significant for cardiac disease including PM placement presents following a pre-syncopal episode while urinating this AM. Pt recovered and has since felt well. Given cardiac hx, will eval for underlying ACS vs dysrhythmia vs UTI which may have caused this episode. EKG regular paced rhythm. PPM interrogated without any acute events.     Patient signed out to Dr. Rocha pending results of testing and reassesment.  All decisions regarding the progression of care will be made at their discretion.

## 2023-01-23 NOTE — ED PROVIDER NOTE - PROGRESS NOTE DETAILS
[facility] PPM interrogated waiting for fax -Jill DO Pt feels well, nml gait, eating. Updated pt and family regarding benign workup results. Pt has close followup with PMD and cardiology outpt. Jason Rehman MD

## 2023-01-23 NOTE — ED ADULT NURSE NOTE - ISOLATION TYPE:
PVD OU:  Patient was cautioned to call our office immediately if they experience a substantial change in their symptoms such as an increase in floaters persistent flashes loss of visual field (may appear as a shadow or a curtain) or decrease in visual acuity as these may indicate a retinal tear or detachment.   If this is a new problem patient will need to return for re-examination  as determined by the physician None

## 2023-01-23 NOTE — ED PROVIDER NOTE - OBJECTIVE STATEMENT
88yo F with CABG/CAD, HTN, HLD, recent cards visit with TTE with recommendation for 6 week Follow up. Today was urinating then stood up and felt dizzy/lightheaded. laid down and symptoms resolved. no recent illness. no GI losses. no bleeding. denies CP. Did note some SOB during the episode no leg swelling. patient daughter says she has mild dementia and may not remember everything. 86yo F with CABG/CAD, HTN, HLD, recent cards visit with TTE with recommendation for 6 week Follow up. Today was urinating then stood up and felt dizzy/lightheaded. laid down and symptoms resolved. no recent illness. no GI losses. no bleeding. denies CP. Did note some SOB during the episode no leg swelling. patient daughter says she has mild dementia and may not remember everything. Occurred @ 11am today since has been fine

## 2023-01-23 NOTE — ED PROVIDER NOTE - ATTENDING CONTRIBUTION TO CARE
I, Sonal Melchor, have personally seen and examined this patient. I have fully participated in the care of this patient. I have reviewed all pertinent clinical information, including history, physical exam, plan and the Resident's note and agree except as noted below.

## 2023-01-25 LAB
CULTURE RESULTS: SIGNIFICANT CHANGE UP
SPECIMEN SOURCE: SIGNIFICANT CHANGE UP

## 2023-01-27 ENCOUNTER — APPOINTMENT (OUTPATIENT)
Dept: CT IMAGING | Facility: CLINIC | Age: 88
End: 2023-01-27
Payer: MEDICARE

## 2023-01-27 ENCOUNTER — OUTPATIENT (OUTPATIENT)
Dept: OUTPATIENT SERVICES | Facility: HOSPITAL | Age: 88
LOS: 1 days | End: 2023-01-27
Payer: MEDICARE

## 2023-01-27 DIAGNOSIS — G30.9 ALZHEIMER'S DISEASE, UNSPECIFIED: ICD-10-CM

## 2023-01-27 DIAGNOSIS — Z90.49 ACQUIRED ABSENCE OF OTHER SPECIFIED PARTS OF DIGESTIVE TRACT: Chronic | ICD-10-CM

## 2023-01-27 PROCEDURE — 70450 CT HEAD/BRAIN W/O DYE: CPT

## 2023-01-27 PROCEDURE — 70450 CT HEAD/BRAIN W/O DYE: CPT | Mod: 26

## 2023-01-31 ENCOUNTER — APPOINTMENT (OUTPATIENT)
Dept: GASTROENTEROLOGY | Facility: CLINIC | Age: 88
End: 2023-01-31
Payer: MEDICARE

## 2023-01-31 VITALS
SYSTOLIC BLOOD PRESSURE: 125 MMHG | RESPIRATION RATE: 16 BRPM | BODY MASS INDEX: 26.99 KG/M2 | DIASTOLIC BLOOD PRESSURE: 60 MMHG | OXYGEN SATURATION: 97 % | HEIGHT: 56 IN | HEART RATE: 71 BPM | WEIGHT: 120 LBS

## 2023-01-31 DIAGNOSIS — R19.7 DIARRHEA, UNSPECIFIED: ICD-10-CM

## 2023-01-31 DIAGNOSIS — Z09 ENCOUNTER FOR FOLLOW-UP EXAMINATION AFTER COMPLETED TREATMENT FOR CONDITIONS OTHER THAN MALIGNANT NEOPLASM: ICD-10-CM

## 2023-01-31 PROCEDURE — 99213 OFFICE O/P EST LOW 20 MIN: CPT

## 2023-01-31 NOTE — HISTORY OF PRESENT ILLNESS
[FreeTextEntry1] : The patient arrived along with her daughter.  She has been evaluated in the past for choledocholithiasis.  History of cholecystectomy and small bowel obstruction requiring surgery.  Patient also having some abdominal discomfort and loose stool.  She underwent stool test and then blood test.  No particular etiology has been noted.  The patient is denying any complaints.  She is having some dizziness with her medication for Alzheimer's.

## 2023-01-31 NOTE — PHYSICAL EXAM

## 2023-01-31 NOTE — ASSESSMENT
[FreeTextEntry1] : The patient is doing well at this time.  I have recommended to no further testing at this time.  Recommended low-fat diet as she has history of cholecystectomy and small bowel resection.  If she has any symptoms in the future, will try cholestyramine.  The patient daughter was counseled at length.  Follow-up as needed.\par \par Ricardo Petty MD\par Gastroenterology \par \par

## 2023-03-01 ENCOUNTER — APPOINTMENT (OUTPATIENT)
Dept: NEUROLOGY | Facility: CLINIC | Age: 88
End: 2023-03-01
Payer: MEDICARE

## 2023-03-01 VITALS
BODY MASS INDEX: 26.99 KG/M2 | DIASTOLIC BLOOD PRESSURE: 70 MMHG | HEIGHT: 56 IN | SYSTOLIC BLOOD PRESSURE: 132 MMHG | WEIGHT: 120 LBS

## 2023-03-01 PROCEDURE — 99213 OFFICE O/P EST LOW 20 MIN: CPT

## 2023-03-01 RX ORDER — MEMANTINE HYDROCHLORIDE 10 MG/1
10 TABLET, FILM COATED ORAL TWICE DAILY
Qty: 180 | Refills: 1 | Status: COMPLETED | COMMUNITY
Start: 2022-11-17 | End: 2023-03-01

## 2023-03-01 NOTE — DATA REVIEWED
[de-identified] : EEG performed in [facility] in February 2021 was normal. 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

## 2023-03-01 NOTE — PHYSICAL EXAM
[General Appearance - Alert] : alert [General Appearance - In No Acute Distress] : in no acute distress [Affect] : the affect was normal [Recall ___ / 3] : recall [unfilled] / 3 [Cranial Nerves Optic (II)] : visual acuity intact bilaterally,  visual fields full to confrontation, pupils equal round and reactive to light [Cranial Nerves Oculomotor (III)] : extraocular motion intact [Cranial Nerves Trigeminal (V)] : facial sensation intact symmetrically [Cranial Nerves Facial (VII)] : face symmetrical [Cranial Nerves Vestibulocochlear (VIII)] : hearing was intact bilaterally [Cranial Nerves Glossopharyngeal (IX)] : tongue and palate midline [Cranial Nerves Accessory (XI - Cranial And Spinal)] : head turning and shoulder shrug symmetric [Cranial Nerves Hypoglossal (XII)] : there was no tongue deviation with protrusion [Motor Tone] : muscle tone was normal in all four extremities [Motor Strength] : muscle strength was normal in all four extremities [Sensation Tactile Decrease] : light touch was intact [Sensation Pain / Temperature Decrease] : pain and temperature was intact [Sensation Vibration Decrease] : vibration was intact [Abnormal Walk] : normal gait [2+] : Patella left 2+ [Optic Disc Abnormality] : the optic disc were normal in size and color [Dysarthria] : no dysarthria [Aphasia] : no dysphasia/aphasia [Romberg's Sign] : Romberg's sign was negtive [Coordination - Dysmetria Impaired Finger-to-Nose Bilateral] : not present [Plantar Reflex Right Only] : normal on the right [Plantar Reflex Left Only] : normal on the left

## 2023-03-01 NOTE — ASSESSMENT
[FreeTextEntry1] : This is an 87 year-old woman with what appears to be Alzheimer's dementia.\par Repeat CT scan demonstrated only a small (3 mm) pituitary cyst but was otherwise unremarkable.\par Blood tests were unremarkable.\par \par She also has a history of chronic headaches.\par Sedimentation rate was 46.\par \par I had explained to the patient's daughter that there are no medications that reverse dementia.\par There are medications that have been shown to slow down the progression of functional decline.\par \par I had suggested a trial of memantine.\par She had side effects and discontinued it.\par \par I will see her back in 6 months.

## 2023-03-01 NOTE — HISTORY OF PRESENT ILLNESS
[FreeTextEntry1] : I saw this patient in the office today.\par She presents with her daughter.\par \par As you recall, she was diagnosed with Alzheimer's dementia in early 2021 in [facility].\par She had symptoms dating back at least to 2019 however.\par It is predominantly short term memory that is affected.\par \par In addition she describes headaches frequently.\par This has been going on for many years.\par \par 1/3/2023 visit:\par The patient had side effects when increasing memantine to full dose.\par She described dizziness that was intolerable.

## 2023-03-19 NOTE — DISCHARGE NOTE NURSING/CASE MANAGEMENT/SOCIAL WORK - NSDCDPATPORTLINK_GEN_ALL_CORE
You can access the [facility], offered by [facility], by registering with the following website: [email or web address] (moderate pain)

## 2023-07-19 ENCOUNTER — APPOINTMENT (OUTPATIENT)
Dept: CARDIOLOGY | Facility: CLINIC | Age: 88
End: 2023-07-19

## 2023-09-06 ENCOUNTER — NON-APPOINTMENT (OUTPATIENT)
Age: 88
End: 2023-09-06

## 2023-09-06 ENCOUNTER — APPOINTMENT (OUTPATIENT)
Dept: CARDIOLOGY | Facility: CLINIC | Age: 88
End: 2023-09-06
Payer: MEDICARE

## 2023-09-06 VITALS
DIASTOLIC BLOOD PRESSURE: 74 MMHG | HEART RATE: 64 BPM | TEMPERATURE: 97.7 F | HEIGHT: 56 IN | SYSTOLIC BLOOD PRESSURE: 112 MMHG | BODY MASS INDEX: 26.77 KG/M2 | OXYGEN SATURATION: 98 % | WEIGHT: 119 LBS

## 2023-09-06 DIAGNOSIS — I10 ESSENTIAL (PRIMARY) HYPERTENSION: ICD-10-CM

## 2023-09-06 PROCEDURE — 99214 OFFICE O/P EST MOD 30 MIN: CPT | Mod: 25

## 2023-09-06 PROCEDURE — 93000 ELECTROCARDIOGRAM COMPLETE: CPT

## 2023-09-12 ENCOUNTER — OFFICE (OUTPATIENT)
Dept: URBAN - METROPOLITAN AREA CLINIC 94 | Facility: CLINIC | Age: 88
Setting detail: OPHTHALMOLOGY
End: 2023-09-12
Payer: MEDICARE

## 2023-09-12 DIAGNOSIS — Z96.1: ICD-10-CM

## 2023-09-12 DIAGNOSIS — H35.723: ICD-10-CM

## 2023-09-12 DIAGNOSIS — H04.122: ICD-10-CM

## 2023-09-12 DIAGNOSIS — H04.121: ICD-10-CM

## 2023-09-12 DIAGNOSIS — H35.3131: ICD-10-CM

## 2023-09-12 DIAGNOSIS — H35.373: ICD-10-CM

## 2023-09-12 PROCEDURE — 99213 OFFICE O/P EST LOW 20 MIN: CPT | Performed by: OPHTHALMOLOGY

## 2023-09-12 PROCEDURE — 83861 MICROFLUID ANALY TEARS: CPT | Performed by: OPHTHALMOLOGY

## 2023-09-12 PROCEDURE — 92134 CPTRZ OPH DX IMG PST SGM RTA: CPT | Performed by: OPHTHALMOLOGY

## 2023-09-12 ASSESSMENT — REFRACTION_MANIFEST
OD_CYLINDER: -1.00
OS_AXIS: 085
OD_VA2: 20/20
OS_VA2: 20/20
OD_VA1: 20/20
OD_SPHERE: +0.75
OS_ADD: +2.75
OD_AXIS: 110
OS_CYLINDER: -2.00
OS_SPHERE: +1.00
OS_VA1: 20/20
OU_VA: 20/20
OD_ADD: +2.75

## 2023-09-12 ASSESSMENT — REFRACTION_AUTOREFRACTION
OS_CYLINDER: -2.00
OS_AXIS: 086
OS_SPHERE: +1.50
OD_AXIS: 109
OD_SPHERE: +1.25
OD_CYLINDER: -2.25

## 2023-09-12 ASSESSMENT — KERATOMETRY
OD_K1POWER_DIOPTERS: 45.25
METHOD_AUTO_MANUAL: AUTO
OD_AXISANGLE_DEGREES: 018
OS_K1POWER_DIOPTERS: 45.25
OS_K2POWER_DIOPTERS: 46.25
OD_K2POWER_DIOPTERS: 46.50
OS_AXISANGLE_DEGREES: 174

## 2023-09-12 ASSESSMENT — CONFRONTATIONAL VISUAL FIELD TEST (CVF)
OD_FINDINGS: FULL
OS_FINDINGS: FULL

## 2023-09-12 ASSESSMENT — SPHEQUIV_DERIVED
OD_SPHEQUIV: 0.125
OD_SPHEQUIV: 0.25
OS_SPHEQUIV: 0
OS_SPHEQUIV: 0.5

## 2023-09-12 ASSESSMENT — AXIALLENGTH_DERIVED
OD_AL: 22.6604
OS_AL: 22.613
OD_AL: 22.7055
OS_AL: 22.7934

## 2023-09-12 ASSESSMENT — PACHYMETRY
OD_CT_UM: 530
OD_CT_CORRECTION: 1
OS_CT_CORRECTION: 0
OS_CT_UM: 543

## 2023-09-12 ASSESSMENT — VISUAL ACUITY
OD_BCVA: 20/40-
OS_BCVA: 20/30-

## 2023-09-12 ASSESSMENT — TONOMETRY: OD_IOP_MMHG: 17

## 2023-09-28 ENCOUNTER — APPOINTMENT (OUTPATIENT)
Dept: NEUROLOGY | Facility: CLINIC | Age: 88
End: 2023-09-28
Payer: MEDICARE

## 2023-09-28 VITALS
SYSTOLIC BLOOD PRESSURE: 130 MMHG | DIASTOLIC BLOOD PRESSURE: 80 MMHG | HEIGHT: 56 IN | BODY MASS INDEX: 26.77 KG/M2 | WEIGHT: 119 LBS

## 2023-09-28 DIAGNOSIS — G44.89 OTHER HEADACHE SYNDROME: ICD-10-CM

## 2023-09-28 PROCEDURE — 99213 OFFICE O/P EST LOW 20 MIN: CPT

## 2023-10-10 ENCOUNTER — NON-APPOINTMENT (OUTPATIENT)
Age: 88
End: 2023-10-10

## 2023-10-10 ENCOUNTER — APPOINTMENT (OUTPATIENT)
Dept: ELECTROPHYSIOLOGY | Facility: CLINIC | Age: 88
End: 2023-10-10
Payer: MEDICARE

## 2023-10-10 VITALS
HEIGHT: 56 IN | WEIGHT: 119.78 LBS | HEART RATE: 61 BPM | OXYGEN SATURATION: 96 % | DIASTOLIC BLOOD PRESSURE: 72 MMHG | BODY MASS INDEX: 26.94 KG/M2 | SYSTOLIC BLOOD PRESSURE: 130 MMHG

## 2023-10-10 DIAGNOSIS — I44.7 LEFT BUNDLE-BRANCH BLOCK, UNSPECIFIED: ICD-10-CM

## 2023-10-10 DIAGNOSIS — I44.0 ATRIOVENTRICULAR BLOCK, FIRST DEGREE: ICD-10-CM

## 2023-10-10 DIAGNOSIS — Z95.0 PRESENCE OF CARDIAC PACEMAKER: ICD-10-CM

## 2023-10-10 PROCEDURE — 99204 OFFICE O/P NEW MOD 45 MIN: CPT | Mod: 25

## 2023-10-10 PROCEDURE — 93000 ELECTROCARDIOGRAM COMPLETE: CPT

## 2023-11-03 ENCOUNTER — OUTPATIENT (OUTPATIENT)
Dept: OUTPATIENT SERVICES | Facility: HOSPITAL | Age: 88
LOS: 1 days | End: 2023-11-03
Payer: MEDICARE

## 2023-11-03 ENCOUNTER — APPOINTMENT (OUTPATIENT)
Dept: ULTRASOUND IMAGING | Facility: CLINIC | Age: 88
End: 2023-11-03
Payer: MEDICARE

## 2023-11-03 DIAGNOSIS — Z90.49 ACQUIRED ABSENCE OF OTHER SPECIFIED PARTS OF DIGESTIVE TRACT: Chronic | ICD-10-CM

## 2023-11-03 DIAGNOSIS — Z00.8 ENCOUNTER FOR OTHER GENERAL EXAMINATION: ICD-10-CM

## 2023-11-03 PROCEDURE — 76775 US EXAM ABDO BACK WALL LIM: CPT | Mod: 26

## 2023-11-03 PROCEDURE — 76775 US EXAM ABDO BACK WALL LIM: CPT

## 2023-11-17 NOTE — CONSULT NOTE ADULT - PROBLEM SELECTOR PROBLEM 1
----- Message from Alden Russell MD sent at 11/17/2023  7:00 AM CST -----  Mammogram will need to be re-ordered  ----- Message -----  From: SYSTEM  Sent: 11/17/2023   1:12 AM CST  To: Alden Russell MD       Pre-operative cardiovascular examination

## 2024-02-12 NOTE — ED ADULT TRIAGE NOTE - SOURCE OF INFORMATION
No care due was identified.  [facility] Embedded Care Due Messages. Reference number: [id number].   2/12/2024 10:14:55 AM CST   Patient/Daughter

## 2024-04-04 NOTE — ED PROVIDER NOTE - OBJECTIVE STATEMENT
normal gait and station  83 y/o F pt with hx of breast cancer, CAD, HLD, HTN presents to ED c/o abdominal pain x 5 days. Pain is diffuse and constant, non radiating and associated with nausea and vomiting. Also c/o constipation. Hx of 2 C-sections. Denies hematemesis. +loss of appetite. Pt is tolerating fluids. Pt had recent air travel from [place].

## 2024-05-22 ENCOUNTER — NON-APPOINTMENT (OUTPATIENT)
Age: 89
End: 2024-05-22

## 2024-05-22 ENCOUNTER — APPOINTMENT (OUTPATIENT)
Dept: CARDIOLOGY | Facility: CLINIC | Age: 89
End: 2024-05-22
Payer: MEDICARE

## 2024-05-22 ENCOUNTER — APPOINTMENT (OUTPATIENT)
Dept: ELECTROPHYSIOLOGY | Facility: CLINIC | Age: 89
End: 2024-05-22

## 2024-05-22 VITALS
WEIGHT: 123 LBS | OXYGEN SATURATION: 96 % | SYSTOLIC BLOOD PRESSURE: 118 MMHG | BODY MASS INDEX: 27.67 KG/M2 | DIASTOLIC BLOOD PRESSURE: 68 MMHG | HEART RATE: 58 BPM | HEIGHT: 56 IN

## 2024-05-22 DIAGNOSIS — R73.03 PREDIABETES.: ICD-10-CM

## 2024-05-22 DIAGNOSIS — G30.9 ALZHEIMER'S DISEASE, UNSPECIFIED: ICD-10-CM

## 2024-05-22 DIAGNOSIS — F02.80 ALZHEIMER'S DISEASE, UNSPECIFIED: ICD-10-CM

## 2024-05-22 DIAGNOSIS — I25.10 ATHEROSCLEROTIC HEART DISEASE OF NATIVE CORONARY ARTERY W/OUT ANGINA PECTORIS: ICD-10-CM

## 2024-05-22 DIAGNOSIS — Z95.1 PRESENCE OF AORTOCORONARY BYPASS GRAFT: ICD-10-CM

## 2024-05-22 PROCEDURE — 99214 OFFICE O/P EST MOD 30 MIN: CPT | Mod: 25

## 2024-05-22 PROCEDURE — 93000 ELECTROCARDIOGRAM COMPLETE: CPT

## 2024-05-22 NOTE — PHYSICAL EXAM

## 2024-05-22 NOTE — REVIEW OF SYSTEMS
[Headache] : headache [Dizziness] : dizziness [Tremor] : no tremor was seen [Convulsions] : no convulsions [Limb Weakness (Paresis)] : no limb weakness (Paresis) [Negative] : Heme/Lymph

## 2024-05-22 NOTE — REASON FOR VISIT
[FreeTextEntry1] : Follow up for HF [Follow-Up - Clinic] : a clinic follow-up of [Carotid Artery Stenosis] : carotid stenosis [Coronary Artery Disease] : coronary artery disease [Spouse] : spouse [Other: _____] : [unfilled]

## 2024-05-22 NOTE — ASSESSMENT
[FreeTextEntry1] : ECG performed today at the office revealed a NSR with 1st degree AV block and LBBB with 2nd repol changes.

## 2024-05-22 NOTE — DISCUSSION/SUMMARY
[FreeTextEntry1] : Ms. PHYLICIA LAWSON is a 87 year female with known CAD. Had CABG x 3 and multiple PCIs (?). Last cath in 2019 revealed patent LIMA to LAD and patent stent in LMCA to LCx with minimal ISR.  Has mild cognitive impairment, possible Alzheimers. At the present time she is completely asymptomatic.  I have recommended to continue the same medications. Routine follow up in 6 months  [EKG obtained to assist in diagnosis and management of assessed problem(s)] : EKG obtained to assist in diagnosis and management of assessed problem(s)

## 2024-06-24 ENCOUNTER — APPOINTMENT (OUTPATIENT)
Dept: NEUROLOGY | Facility: CLINIC | Age: 89
End: 2024-06-24

## 2024-10-25 NOTE — ED STATDOCS - CROS ED EYES ALL NEG
Rx Refill Request Telephone Encounter    Name:  Silvio Noel  :  [id number]  Medication Name:  rosuvastatin (Crestor)             Specific Pharmacy location:  [facility] at [address], [place].  Date of last appointment:  Date of next appointment:  24  Best number to reach patient:              
negative...

## 2024-12-04 ENCOUNTER — APPOINTMENT (OUTPATIENT)
Dept: CARDIOLOGY | Facility: CLINIC | Age: 89
End: 2024-12-04